# Patient Record
Sex: FEMALE | Race: WHITE | NOT HISPANIC OR LATINO | Employment: FULL TIME | URBAN - METROPOLITAN AREA
[De-identification: names, ages, dates, MRNs, and addresses within clinical notes are randomized per-mention and may not be internally consistent; named-entity substitution may affect disease eponyms.]

---

## 2017-02-17 ENCOUNTER — OFFICE VISIT (OUTPATIENT)
Dept: ORTHOPEDIC SURGERY | Facility: CLINIC | Age: 57
End: 2017-02-17

## 2017-02-17 VITALS — HEIGHT: 67 IN | BODY MASS INDEX: 45.99 KG/M2 | WEIGHT: 293 LBS

## 2017-02-17 DIAGNOSIS — IMO0002 BURSITIS/TENDONITIS, SHOULDER: ICD-10-CM

## 2017-02-17 DIAGNOSIS — M25.512 CHRONIC LEFT SHOULDER PAIN: Primary | ICD-10-CM

## 2017-02-17 DIAGNOSIS — G89.29 CHRONIC LEFT SHOULDER PAIN: Primary | ICD-10-CM

## 2017-02-17 PROCEDURE — 99203 OFFICE O/P NEW LOW 30 MIN: CPT | Performed by: ORTHOPAEDIC SURGERY

## 2017-02-17 PROCEDURE — 73030 X-RAY EXAM OF SHOULDER: CPT | Performed by: ORTHOPAEDIC SURGERY

## 2017-02-17 PROCEDURE — 20610 DRAIN/INJ JOINT/BURSA W/O US: CPT | Performed by: ORTHOPAEDIC SURGERY

## 2017-02-17 RX ORDER — BUPIVACAINE HYDROCHLORIDE 5 MG/ML
4 INJECTION, SOLUTION PERINEURAL
Status: COMPLETED | OUTPATIENT
Start: 2017-02-17 | End: 2017-02-17

## 2017-02-17 RX ORDER — METHYLPREDNISOLONE ACETATE 80 MG/ML
80 INJECTION, SUSPENSION INTRA-ARTICULAR; INTRALESIONAL; INTRAMUSCULAR; SOFT TISSUE
Status: COMPLETED | OUTPATIENT
Start: 2017-02-17 | End: 2017-02-17

## 2017-02-17 RX ADMIN — BUPIVACAINE HYDROCHLORIDE 4 ML: 5 INJECTION, SOLUTION PERINEURAL at 09:03

## 2017-02-17 RX ADMIN — METHYLPREDNISOLONE ACETATE 80 MG: 80 INJECTION, SUSPENSION INTRA-ARTICULAR; INTRALESIONAL; INTRAMUSCULAR; SOFT TISSUE at 09:03

## 2017-02-17 NOTE — PROGRESS NOTES
New Left Shoulder      Patient: Iris Hyde        YOB: 1960    Medical Record Number: [id number]        Chief Complaints: Left Shoulder Pain  Chief Complaint   Patient presents with   • Left Shoulder - Establish Care           History of Present Illness: This is a  56 y.o. female who presents with complaints of left shoulder pain.  She is right-hand-dominant is been ongoing for a couple months no history injury change in activity that she can recall she does have night pain.  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  She states it feels the same.  Her pain as a 5 out of 10 she's tried ice heat ibuprofen her pain is described as moderate constant aching with clicking popping snapping past medical history marked for colitis thyroid issues depression and anxiety skin cancer sleep apnea all of which are stable.          Allergies:   Allergies   Allergen Reactions   • Butorphanol    • Cymbalta [Duloxetine Hcl]    • Hydrocodone-Acetaminophen      norco        Medications:   Home Medications:  Current Outpatient Prescriptions on File Prior to Visit   Medication Sig   • cyclobenzaprine (FLEXERIL) 10 MG tablet Take one tablet by mouth TID*needs appt*   • levothyroxine (SYNTHROID, LEVOTHROID) 175 MCG tablet Take 1 tablet by mouth daily.   • venlafaxine XR (EFFEXOR-XR) 75 MG 24 hr capsule TAKE 1 CAPSULE ONCE DAILY WITH FOOD.   • albuterol (PROVENTIL HFA;VENTOLIN HFA) 108 (90 BASE) MCG/ACT inhaler ProAir  (90 Base) MCG/ACT Inhalation Aerosol Solution; Patient Sig: ProAir  (90 Base) MCG/ACT Inhalation Aerosol Solution INHALE 2 PUFFS EVERY 4-6 HOURS AS NEEDED.; 1; 3; 27-May-2015; Active   • melatonin tablet Take 1 mg by mouth every night.   • meloxicam (MOBIC) 7.5 MG tablet TAKE 2 TABLETS BY MOUTH DAILY.   • meloxicam (MOBIC) 7.5 MG tablet TAKE 2 TABLETS BY MOUTH DAILY.   • nitrofurantoin, macrocrystal-monohydrate,  (MACROBID) 100 MG capsule Take 1 capsule by mouth 2 (two) times a day.   • promethazine (PHENERGAN) 25 MG tablet Take 1 tablet by mouth every 4 (four) hours as needed for nausea.   • [DISCONTINUED] cyclobenzaprine (FLEXERIL) 10 MG tablet TAKE 1 TABLET BY MOUTH 3 TIMES A DAY     No current facility-administered medications on file prior to visit.      Current Medications:  Scheduled Meds:  Continuous Infusions:  No current facility-administered medications for this visit.   PRN Meds:.    Past Medical History   Diagnosis Date   • Alkaline phosphatase elevation    • Alopecia    • Anxiety    • Arthritis    • Asthmatic bronchitis    • Bladder spasms    • Chronic low back pain    • Colitis    • Depression    • Eczema    • Esophageal reflux    • Fibromyalgia    • Headache    • High risk medication use    • Migraine headache    • Neoplasm of uncertain behavior of breast    • Skin cancer    • Sleep apnea with use of continuous positive airway pressure (CPAP)    • Thyroid disorder    • Urinary frequency    • Urinary pain         Past Surgical History   Procedure Laterality Date   • Appendectomy  1980     DR. MATIAS   • Cholecystectomy  1989     DR. IGOR FUNES   • Craniotomy  1996   • Tubal abdominal ligation  1982        Social History     Occupational History   • Not on file.     Social History Main Topics   • Smoking status: Current Every Day Smoker   • Smokeless tobacco: Not on file   • Alcohol use Yes   • Drug use: Not on file   • Sexual activity: Not on file    Social History     Social History Narrative        Family History   Problem Relation Age of Onset   • Heart disease Other    • Diabetes Other    • Alcohol abuse Other    • Anxiety disorder Other    • Bipolar disorder Other    • Cancer Other    • Depression Other    • Lung disease Other    • Kidney disease Other    • Rheum arthritis Other    • Thyroid disease Other              Review of Systems: 14 point review of systems are remarkable for the pertinent  "positives listed in the chart by the patient the remainder are negative     Review of Systems      Physical Exam: 56 y.o. female  General Appearance:    Alert, cooperative, in no acute distress                 Vitals:    02/17/17 0839   Weight: 297 lb 12.8 oz (135 kg)   Height: 67\" (170.2 cm)      Patient is alert and read ×3 no acute distress appears her above-listed at height weight and age.  Affect is normal respiratory rate is normal unlabored. Heart rate regular rate rhythm, sclera, dentition and hearing are normal for the purpose of this exam.    Ortho Exam Physical exam of the left shoulder reveals no overlying skin changes no lymphedema no lymphadenopathy.  Patient has active flexion to 90° passively I can get her to 180 with mild symptoms abduction is similar external rotation is to 50 and internal rotation to the upper lumbar spine with mild symptoms.  Patient has good rotator cuff strength 4+ over 5 with isometric strength testing with pain.  Patient has a positive impingement and a positive Mejia sign.  Patient has good cervical range of motion which is full and asymptomatic no radicular symptoms.  Patient has a normal elbow exam.  Good distal pulses are presentPatient has pain with overhead activity and a positive Neer sign and a positive empty can sign        Large Joint Arthrocentesis  Date/Time: 2/17/2017 9:03 AM  Consent given by: patient  Site marked: site marked  Timeout: Immediately prior to procedure a time out was called to verify the correct patient, procedure, equipment, support staff and site/side marked as required   Supporting Documentation  Indications: pain   Procedure Details  Location: shoulder - L subacromial bursa  Preparation: Patient was prepped and draped in the usual sterile fashion  Needle size: 25 G  Approach: posterior  Medications administered: 4 mL bupivacaine; 80 mg methylPREDNISolone acetate 80 MG/ML  Patient tolerance: patient tolerated the procedure well with no " immediate complications                Radiology:   AP, Scapular Y and Axillary Lateral of the left shoulder were ordered/reviewed to evauate shoulder pain.  I've no comparative films these show some mild acromioclavicular arthritis no evidence of prior repair or any anchors that are present no acute bony pathology     Assessment/Plan: Left shoulder pain I really think this is rotator cuff in origin she can only actively flex to 90 passively and get her all the way she's got good strength I do think it's rotator cuff hopefully just inflamed and impingement.  Plan is proceed with an injection and physical therapy I'll see her back in 1 month if she fails improvement we will pursue other means of testing she is taking ibuprofen but does have a history of ulcers well will not put her on anything else by mouth but we will do the injection

## 2017-02-23 RX ORDER — VENLAFAXINE HYDROCHLORIDE 75 MG/1
CAPSULE, EXTENDED RELEASE ORAL
Qty: 30 CAPSULE | Refills: 0 | Status: SHIPPED | OUTPATIENT
Start: 2017-02-23 | End: 2017-03-29 | Stop reason: SDUPTHER

## 2017-03-01 RX ORDER — CYCLOBENZAPRINE HCL 10 MG
TABLET ORAL
Qty: 30 TABLET | Refills: 0 | Status: SHIPPED | OUTPATIENT
Start: 2017-03-01 | End: 2017-03-30 | Stop reason: SDUPTHER

## 2017-03-03 ENCOUNTER — TREATMENT (OUTPATIENT)
Dept: PHYSICAL THERAPY | Facility: CLINIC | Age: 57
End: 2017-03-03

## 2017-03-03 DIAGNOSIS — M25.512 CHRONIC LEFT SHOULDER PAIN: Primary | ICD-10-CM

## 2017-03-03 DIAGNOSIS — G89.29 CHRONIC LEFT SHOULDER PAIN: Primary | ICD-10-CM

## 2017-03-03 PROCEDURE — 97110 THERAPEUTIC EXERCISES: CPT | Performed by: PHYSICAL THERAPIST

## 2017-03-03 PROCEDURE — 97140 MANUAL THERAPY 1/> REGIONS: CPT | Performed by: PHYSICAL THERAPIST

## 2017-03-03 PROCEDURE — 97161 PT EVAL LOW COMPLEX 20 MIN: CPT | Performed by: PHYSICAL THERAPIST

## 2017-03-03 NOTE — PROGRESS NOTES
Physical Therapy Initial Evaluation and Plan of Care    TIME IN 1230 TIME OUT 0135    Subjective Evaluation    History of Present Illness  Date of onset: 10/1/2016  Mechanism of injury: Med hx: left shoulder surgery approx 10 years ago with no residual effects.   Started having pain left shoulder pain approx 6 months. Unknown cause.  Progressively getting worse.         Quality of life: fair    Pain  Quality: throbbing, sharp and radiating  Relieving factors: ice, heat and medications  Progression: worsening    Hand dominance: right    Treatments  Previous treatment: medication and injection treatment  Patient Goals  Patient goals for therapy: independence with ADLs/IADLs and decreased pain  Patient goal: alleviate pain in left shoulder           Objective     Static Posture     Shoulders  Elevated.    Scapulae  Right upwardly rotated.    Postural Observations  Seated posture: fair  Standing posture: fair        Palpation   Left   Hypotonic in the pectoralis major and upper trapezius.   Tenderness of the biceps, pectoralis major, rhomboids, teres minor and upper trapezius.     Tenderness     Left Shoulder   Tenderness in the subscapularis tendon.     Cervical/Thoracic Screen   Cervical range of motion within normal limits with the following exceptions: 75% available    Active Range of Motion   Left Shoulder   Flexion: 90 degrees   Abduction: 90 degrees   External rotation 45°: 70 degrees     Passive Range of Motion   Left Shoulder   Flexion: 140 degrees   Abduction: 110 degrees   External rotation 45°: 50 degrees     Strength/Myotome Testing     Left Shoulder     Planes of Motion   Flexion: 3+   Abduction: 3+   External rotation at 0°: 3+     Right Shoulder     Planes of Motion   Flexion: 4   Extension: 4   External rotation at 0°: 4     Tests     Left Shoulder   Negative active compression ([place]), drop arm, empty can, full can and Neer's.          Assessment & Plan     Assessment  Impairments: abnormal or  restricted ROM, activity intolerance, impaired physical strength and lacks appropriate home exercise program  Assessment details: Patient is a 56 year old female who presents with increased left shoulder pain with unknown onset.  She states that the pain has been ongoing for 6 months and is progressively getting worse.  She has constant throbbing and intermittent sharp radiating pain down into her arm.  Upon assessment she has point tenderness bicipital groove and SC joint.  AROM and Passive ROM are both limited secondary to pain.  Negative special testing for internal derangement. Postural assessment reveals elevated shoulder bilaterally L>R with upwardly rounded left scapula. Positive shrug sign. Strength is only mildly inhibited.   Prognosis: good  Prognosis details: Long Term Goals (6 weeks)    Patient is able to return to work with minimal to no discomfort  Patient is able to lift 25 lbs from floor to waist  Patient is able to lift 15 lbs from waist to shoulder  Patient is able to lift 10 lbs from shoulder to overhead  Patient is able stand for as long as needed for work related tasks.          Short Term Goals (3 weeks)    Patient is independent with HEP  Patient is able to sleep without being disrupted by pain.   Patient has full AROM/PROM of left shoulder  Patient has greater than 50% improvement overall.   Patient is able to reach into overhead cabinets with minimal discomfort  Left UE strength 4/5 throughout.            Goals  alleviate pain in left shoulder    Plan  Therapy options: will be seen for skilled physical therapy services  Planned modality interventions: cryotherapy, ultrasound, iontophoresis, hydrotherapy, electrical stimulation/Russian stimulation and thermotherapy (hydrocollator packs)  Planned therapy interventions: manual therapy, body mechanics training, flexibility, functional ROM exercises, home exercise program, gait training, joint mobilization, therapeutic activities, stretching,  strengthening, soft tissue mobilization and abdominal trunk stabilization  Frequency: 3x week  Duration in weeks: 6  Treatment plan discussed with: patient        Manual Therapy:    25     mins  86524;  Therapeutic Exercise:    20     mins  08157;     Timed Treatment:   45   mins   Total Treatment:     60   mins    PT SIGNATURE: Colleen Apple, PT   DATE TREATMENT INITIATED: 3/3/2017    Initial Certification  Certification Period: 6/1/2017  I certify that the therapy services are furnished while this patient is under my care.  The services outlined above are required by this patient, and will be reviewed every 90 days.     PHYSICIAN: Edilia Iqbal MD      DATE:     Please sign and return via fax to [phone].. Thank you, [facility].

## 2017-03-17 ENCOUNTER — OFFICE VISIT (OUTPATIENT)
Dept: ORTHOPEDIC SURGERY | Facility: CLINIC | Age: 57
End: 2017-03-17

## 2017-03-17 VITALS — HEIGHT: 67 IN | BODY MASS INDEX: 45.99 KG/M2 | WEIGHT: 293 LBS

## 2017-03-17 DIAGNOSIS — M75.102 TEAR OF LEFT ROTATOR CUFF, UNSPECIFIED TEAR EXTENT: Primary | ICD-10-CM

## 2017-03-17 PROCEDURE — 99212 OFFICE O/P EST SF 10 MIN: CPT | Performed by: ORTHOPAEDIC SURGERY

## 2017-03-17 NOTE — PROGRESS NOTES
"Left Shoulder Follow Up      Patient: Iris Hyde        YOB: 1960            Chief Complaints: Left Shoulder pain  Chief Complaint   Patient presents with   • Left Shoulder - Follow-up           History of Present Illness: This patient is here follow-up of left shoulder pain.  We have undertaken conservative management in the form of physical therapy injections rest strengthening all with no improvement in her symptoms.  She still has significant activity limiting pain and night pain      Physical Exam: 56 y.o. female  General Appearance:    Alert, cooperative, in no acute distress                   Vitals:    03/17/17 0754   Weight: 297 lb (135 kg)   Height: 67\" (170.2 cm)        Patient is alert and read ×3 no acute distress appears her above-listed at height weight and age.  Affect is normal respiratory rate is normal unlabored. Heart rate regular rate rhythm, sclera, dentition and hearing are normal for the purpose of this exam.      Ortho Exam    Physical exam of the left shoulder reveals no overlying skin changes no lymphedema no lymphadenopathy.  Patient has active flexion 180 with mild symptoms abduction is similar external rotation is to 50 and internal rotation to the upper lumbar spine with mild symptoms.  Patient has good rotator cuff strength 4+ over 5 with isometric strength testing with pain.  Patient has a positive impingement and a positive Mejia sign.  Patient has good cervical range of motion which is full and asymptomatic no radicular symptoms.  Patient has a normal elbow exam.  Good distal pulses are presentPatient has pain with overhead activity and a positive Neer sign and a positive empty can sign              Assessment/Plan:      Persistent left shoulder pain despite conservative management plan is to proceed with an MRI.  My suspicion is she has a rotator cuff tear which she and I discussed in detail.  I will see her back after her MRI            "

## 2017-03-24 ENCOUNTER — OFFICE VISIT (OUTPATIENT)
Dept: ORTHOPEDIC SURGERY | Facility: CLINIC | Age: 57
End: 2017-03-24

## 2017-03-24 DIAGNOSIS — M75.102 TEAR OF LEFT ROTATOR CUFF, UNSPECIFIED TEAR EXTENT: ICD-10-CM

## 2017-03-24 PROCEDURE — 73221 MRI JOINT UPR EXTREM W/O DYE: CPT | Performed by: ORTHOPAEDIC SURGERY

## 2017-03-29 RX ORDER — VENLAFAXINE HYDROCHLORIDE 75 MG/1
CAPSULE, EXTENDED RELEASE ORAL
Qty: 15 CAPSULE | Refills: 0 | Status: SHIPPED | OUTPATIENT
Start: 2017-03-29 | End: 2017-06-09 | Stop reason: SDUPTHER

## 2017-03-30 RX ORDER — CYCLOBENZAPRINE HCL 10 MG
TABLET ORAL
Qty: 30 TABLET | Refills: 0 | Status: SHIPPED | OUTPATIENT
Start: 2017-03-30 | End: 2017-04-21 | Stop reason: SDUPTHER

## 2017-04-18 ENCOUNTER — DOCUMENTATION (OUTPATIENT)
Dept: PHYSICAL THERAPY | Facility: CLINIC | Age: 57
End: 2017-04-18

## 2017-04-18 NOTE — PROGRESS NOTES
Discharge Summary  Discharge Summary from Physical Therapy Report      Dates  PT visit: 03/03/2017  Number of Visits: 1     Discharge Status of Patient: See MD Note dated Evaluation only; patient cancelled remaining appointments.     Goals: unknown    Discharge Plan: Patient to return to referring/providing physician  patient did not return for additional physical therapy    Comments 5    Date of Discharge 04/18/17        Colleen Apple, PT  Physical Therapist

## 2017-04-21 RX ORDER — CYCLOBENZAPRINE HCL 10 MG
TABLET ORAL
Qty: 30 TABLET | Refills: 0 | Status: SHIPPED | OUTPATIENT
Start: 2017-04-21 | End: 2017-06-09 | Stop reason: SDUPTHER

## 2017-05-28 RX ORDER — CYCLOBENZAPRINE HCL 10 MG
TABLET ORAL
Qty: 30 TABLET | Refills: 0 | OUTPATIENT
Start: 2017-05-28

## 2017-06-01 RX ORDER — CYCLOBENZAPRINE HCL 10 MG
TABLET ORAL
Qty: 30 TABLET | Refills: 0 | OUTPATIENT
Start: 2017-06-01

## 2017-06-01 RX ORDER — VENLAFAXINE HYDROCHLORIDE 75 MG/1
CAPSULE, EXTENDED RELEASE ORAL
Qty: 15 CAPSULE | Refills: 0 | OUTPATIENT
Start: 2017-06-01

## 2017-06-09 ENCOUNTER — TELEPHONE (OUTPATIENT)
Dept: FAMILY MEDICINE CLINIC | Facility: CLINIC | Age: 57
End: 2017-06-09

## 2017-06-09 RX ORDER — VENLAFAXINE HYDROCHLORIDE 75 MG/1
CAPSULE, EXTENDED RELEASE ORAL
Qty: 30 CAPSULE | Refills: 0 | Status: SHIPPED | OUTPATIENT
Start: 2017-06-09 | End: 2017-07-07 | Stop reason: SDUPTHER

## 2017-06-09 RX ORDER — CYCLOBENZAPRINE HCL 10 MG
TABLET ORAL
Qty: 30 TABLET | Refills: 0 | Status: SHIPPED | OUTPATIENT
Start: 2017-06-09 | End: 2017-06-28 | Stop reason: SDUPTHER

## 2017-06-09 NOTE — TELEPHONE ENCOUNTER
----- Message from Quirino Carroll sent at 6/9/2017  2:26 PM EDT -----  Patient would like a refill on Flexeril 10 mg & Effexor 75 mg 30 day supply for both. [facility] [address]. Patient is scheduled to see the doctor on Wednesday 6/14/17.    Thanks,  Silke

## 2017-06-14 ENCOUNTER — OFFICE VISIT (OUTPATIENT)
Dept: FAMILY MEDICINE CLINIC | Facility: CLINIC | Age: 57
End: 2017-06-14

## 2017-06-14 VITALS
BODY MASS INDEX: 45.99 KG/M2 | SYSTOLIC BLOOD PRESSURE: 148 MMHG | OXYGEN SATURATION: 98 % | DIASTOLIC BLOOD PRESSURE: 92 MMHG | RESPIRATION RATE: 16 BRPM | HEART RATE: 74 BPM | TEMPERATURE: 98.3 F | HEIGHT: 67 IN | WEIGHT: 293 LBS

## 2017-06-14 DIAGNOSIS — M19.90 ARTHRITIS: ICD-10-CM

## 2017-06-14 DIAGNOSIS — S80.12XD CONTUSION OF LEG, LEFT, SUBSEQUENT ENCOUNTER: ICD-10-CM

## 2017-06-14 DIAGNOSIS — K21.9 GASTROESOPHAGEAL REFLUX DISEASE WITHOUT ESOPHAGITIS: ICD-10-CM

## 2017-06-14 DIAGNOSIS — G47.30 SLEEP APNEA, UNSPECIFIED TYPE: ICD-10-CM

## 2017-06-14 DIAGNOSIS — E03.9 ACQUIRED HYPOTHYROIDISM: ICD-10-CM

## 2017-06-14 DIAGNOSIS — E66.01 OBESITY, MORBID, BMI 40.0-49.9 (HCC): ICD-10-CM

## 2017-06-14 DIAGNOSIS — M79.7 FIBROMYALGIA: Primary | ICD-10-CM

## 2017-06-14 DIAGNOSIS — G43.909 MIGRAINE WITHOUT STATUS MIGRAINOSUS, NOT INTRACTABLE, UNSPECIFIED MIGRAINE TYPE: ICD-10-CM

## 2017-06-14 DIAGNOSIS — Z79.899 HIGH RISK MEDICATION USE: ICD-10-CM

## 2017-06-14 DIAGNOSIS — Z00.00 HEALTH CARE MAINTENANCE: ICD-10-CM

## 2017-06-14 PROBLEM — K52.9 NON-SPECIFIC COLITIS: Status: RESOLVED | Noted: 2017-06-14 | Resolved: 2017-06-14

## 2017-06-14 PROBLEM — L65.9 LOSS OF HAIR: Status: ACTIVE | Noted: 2017-06-14

## 2017-06-14 PROBLEM — E07.9 DISORDER OF THYROID: Status: ACTIVE | Noted: 2017-06-14

## 2017-06-14 PROBLEM — R30.0 DYSURIA: Status: ACTIVE | Noted: 2017-06-14

## 2017-06-14 PROBLEM — D48.60 NEOPLASM OF UNCERTAIN BEHAVIOR OF BREAST: Status: ACTIVE | Noted: 2017-06-14

## 2017-06-14 PROBLEM — F32.A DEPRESSION: Status: ACTIVE | Noted: 2017-06-14

## 2017-06-14 PROBLEM — L30.9 ECZEMA: Status: ACTIVE | Noted: 2017-06-14

## 2017-06-14 PROBLEM — R11.2 NAUSEA AND VOMITING: Status: ACTIVE | Noted: 2017-06-14

## 2017-06-14 PROBLEM — M54.9 BACK PAIN: Status: ACTIVE | Noted: 2017-06-14

## 2017-06-14 PROBLEM — N39.0 INFECTION OF URINARY TRACT: Status: ACTIVE | Noted: 2017-06-14

## 2017-06-14 PROBLEM — R74.8 ELEVATED SERUM ALKALINE PHOSPHATASE LEVEL: Status: ACTIVE | Noted: 2017-06-14

## 2017-06-14 PROBLEM — K52.9 NON-SPECIFIC COLITIS: Status: ACTIVE | Noted: 2017-06-14
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
## 2017-06-14 PROBLEM — F41.9 ANXIETY: Status: ACTIVE | Noted: 2017-06-14

## 2017-06-14 PROBLEM — R35.0 INCREASED FREQUENCY OF URINATION: Status: ACTIVE | Noted: 2017-06-14

## 2017-06-14 PROBLEM — K52.9 NONINFECTIOUS GASTROENTERITIS: Status: ACTIVE | Noted: 2017-06-14

## 2017-06-14 PROBLEM — R11.2 NAUSEA AND VOMITING: Status: RESOLVED | Noted: 2017-06-14 | Resolved: 2017-06-14

## 2017-06-14 PROBLEM — S00.83XA CONTUSION OF FOREHEAD: Status: ACTIVE | Noted: 2017-06-14

## 2017-06-14 PROBLEM — K52.9 NONINFECTIOUS GASTROENTERITIS: Status: RESOLVED | Noted: 2017-06-14 | Resolved: 2017-06-14

## 2017-06-14 PROBLEM — C80.1 MALIGNANT NEOPLASTIC DISEASE: Status: ACTIVE | Noted: 2017-06-14

## 2017-06-14 PROBLEM — K43.2 INCISIONAL HERNIA: Status: ACTIVE | Noted: 2017-06-14

## 2017-06-14 PROBLEM — R31.9 HEMATURIA: Status: ACTIVE | Noted: 2017-06-14

## 2017-06-14 PROCEDURE — 99214 OFFICE O/P EST MOD 30 MIN: CPT | Performed by: FAMILY MEDICINE

## 2017-06-14 NOTE — PROGRESS NOTES
HPI  Iris Hyde is a 57 y.o. female who is here for follow up of hypothyroidism hypertension and obesity.  Patient had a recent fall sustaining a hematoma to the left lower leg.  Also is asking about bariatric surgery.  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      Review of Systems   Cardiovascular: Positive for leg swelling.   Gastrointestinal: Positive for abdominal distention.   Endocrine: Positive for heat intolerance.   Musculoskeletal: Positive for arthralgias, back pain, joint swelling and myalgias.   All other systems reviewed and are negative.        Past Medical History:   Diagnosis Date   • Alkaline phosphatase elevation    • Alopecia    • Anxiety    • Arthritis    • Asthmatic bronchitis    • Bladder spasms    • Chronic low back pain    • Colitis    • Depression    • Eczema    • Esophageal reflux    • Fibromyalgia    • Headache    • High risk medication use    • Migraine headache    • Neoplasm of uncertain behavior of breast    • Skin cancer    • Sleep apnea with use of continuous positive airway pressure (CPAP)    • Thyroid disorder    • Urinary frequency    • Urinary pain        Past Surgical History:   Procedure Laterality Date   • APPENDECTOMY  1980    DR. MATIAS   • CHOLECYSTECTOMY  1989    DR. IGOR FUNES   • CRANIOTOMY  1996   • TUBAL ABDOMINAL LIGATION  1982       Family History   Problem Relation Age of Onset   • Heart disease Other    • Diabetes Other    • Alcohol abuse Other    • Anxiety disorder Other    • Bipolar disorder Other    • Cancer Other    • Depression Other    • Lung disease Other    • Kidney disease Other    • Rheum arthritis Other    • Thyroid disease Other        Social History     Social History   • Marital status:      Spouse name: N/A   • Number of children: N/A   • Years of education: N/A     Occupational History   • Not on file.     Social History Main Topics   • Smoking status: Current Every Day Smoker   • Smokeless tobacco: Not on file   • Alcohol use Yes    • Drug use: Yes     Special: Marijuana   • Sexual activity: Not on file     Other Topics Concern   • Not on file     Social History Narrative         Physical Exam   Constitutional: She is oriented to person, place, and time. She appears well-developed and well-nourished. No distress.   HENT:   Head: Normocephalic.   Mouth/Throat: Oropharynx is clear and moist.   Eyes: Conjunctivae are normal. Pupils are equal, round, and reactive to light.   Neck: Normal range of motion. Neck supple. No JVD present. No thyromegaly present.   Cardiovascular: Normal rate, regular rhythm and normal heart sounds.    Pulmonary/Chest: Effort normal. No respiratory distress. She has no wheezes.   Abdominal: Soft. She exhibits no distension. There is no tenderness.   Musculoskeletal: She exhibits edema, tenderness and deformity.        Left lower leg: She exhibits tenderness, swelling, edema and deformity. She exhibits no bony tenderness and no laceration.        Legs:  Neurological: She is alert and oriented to person, place, and time. She exhibits normal muscle tone. Coordination normal.   Skin: Skin is warm and dry. Ecchymosis noted. No erythema.        Psychiatric: She has a normal mood and affect. Her behavior is normal. Judgment and thought content normal.   Nursing note and vitals reviewed.        Assessment/Plan    Iris was seen today for hypothyroidism, obesity and fall.    Diagnoses and all orders for this visit:    Fibromyalgia    Health care maintenance  -     Lipid Panel  -     Hepatitis C Antibody    High risk medication use  -     CBC & Differential  -     Comprehensive Metabolic Panel    Arthritis    Acquired hypothyroidism  -     TSH+Free T4    Migraine without status migrainosus, not intractable, unspecified migraine type    Gastroesophageal reflux disease without esophagitis    Contusion of leg, left, subsequent encounter    Obesity, morbid, BMI 40.0-49.9  -     Ambulatory Referral to Bariatric Surgery    Sleep apnea,  unspecified type      Patient presents for follow-up of above-noted medical problems.  Overall doing well except recently tripped and has a large hematoma left lateral calf area.  Considering the current therapy and return if any worsening or does not continue to slowly resolve.  Will get routine lab work as noted and also refer to bariatric surgery as discussed.  Otherwise continue current therapy and follow-up in 6 months.  Encouraged healthy diet and weight loss.    This note includes information entered using a voice recognition dictation system.  Though reviewed, some nonsensible errors may remain.

## 2017-06-15 DIAGNOSIS — E03.9 ACQUIRED HYPOTHYROIDISM: Primary | ICD-10-CM

## 2017-06-15 LAB
ALBUMIN SERPL-MCNC: 4.3 G/DL (ref 3.5–5.2)
ALBUMIN/GLOB SERPL: 1.6 G/DL
ALP SERPL-CCNC: 121 U/L (ref 39–117)
ALT SERPL-CCNC: 20 U/L (ref 1–33)
AST SERPL-CCNC: 26 U/L (ref 1–32)
BASOPHILS # BLD AUTO: 0.05 10*3/MM3 (ref 0–0.2)
BASOPHILS NFR BLD AUTO: 0.6 % (ref 0–1.5)
BILIRUB SERPL-MCNC: 0.2 MG/DL (ref 0.1–1.2)
BUN SERPL-MCNC: 15 MG/DL (ref 6–20)
BUN/CREAT SERPL: 14.2 (ref 7–25)
CALCIUM SERPL-MCNC: 9.1 MG/DL (ref 8.6–10.5)
CHLORIDE SERPL-SCNC: 102 MMOL/L (ref 98–107)
CHOLEST SERPL-MCNC: 191 MG/DL (ref 0–200)
CO2 SERPL-SCNC: 23.1 MMOL/L (ref 22–29)
CREAT SERPL-MCNC: 1.06 MG/DL (ref 0.57–1)
EOSINOPHIL # BLD AUTO: 0.18 10*3/MM3 (ref 0–0.7)
EOSINOPHIL NFR BLD AUTO: 2.1 % (ref 0.3–6.2)
ERYTHROCYTE [DISTWIDTH] IN BLOOD BY AUTOMATED COUNT: 14.1 % (ref 11.7–13)
GLOBULIN SER CALC-MCNC: 2.7 GM/DL
GLUCOSE SERPL-MCNC: 97 MG/DL (ref 65–99)
HCT VFR BLD AUTO: 40.7 % (ref 35.6–45.5)
HCV AB S/CO SERPL IA: <0.1 S/CO RATIO (ref 0–0.9)
HDLC SERPL-MCNC: 41 MG/DL (ref 40–60)
HGB BLD-MCNC: 13.6 G/DL (ref 11.9–15.5)
IMM GRANULOCYTES # BLD: 0.04 10*3/MM3 (ref 0–0.03)
IMM GRANULOCYTES NFR BLD: 0.5 % (ref 0–0.5)
INTERPRETATION: NORMAL
LDLC SERPL CALC-MCNC: 121 MG/DL (ref 0–100)
LYMPHOCYTES # BLD AUTO: 4.22 10*3/MM3 (ref 0.9–4.8)
LYMPHOCYTES NFR BLD AUTO: 48.5 % (ref 19.6–45.3)
MCH RBC QN AUTO: 32.9 PG (ref 26.9–32)
MCHC RBC AUTO-ENTMCNC: 33.4 G/DL (ref 32.4–36.3)
MCV RBC AUTO: 98.3 FL (ref 80.5–98.2)
MONOCYTES # BLD AUTO: 0.69 10*3/MM3 (ref 0.2–1.2)
MONOCYTES NFR BLD AUTO: 7.9 % (ref 5–12)
NEUTROPHILS # BLD AUTO: 3.52 10*3/MM3 (ref 1.9–8.1)
NEUTROPHILS NFR BLD AUTO: 40.4 % (ref 42.7–76)
PLATELET # BLD AUTO: 236 10*3/MM3 (ref 140–500)
POTASSIUM SERPL-SCNC: 3.8 MMOL/L (ref 3.5–5.2)
PROT SERPL-MCNC: 7 G/DL (ref 6–8.5)
RBC # BLD AUTO: 4.14 10*6/MM3 (ref 3.9–5.2)
SODIUM SERPL-SCNC: 143 MMOL/L (ref 136–145)
T4 FREE SERPL-MCNC: 1.47 NG/DL (ref 0.93–1.7)
TRIGL SERPL-MCNC: 147 MG/DL (ref 0–150)
TSH SERPL DL<=0.005 MIU/L-ACNC: 10.87 MIU/ML (ref 0.27–4.2)
VLDLC SERPL CALC-MCNC: 29.4 MG/DL (ref 5–40)
WBC # BLD AUTO: 8.7 10*3/MM3 (ref 4.5–10.7)

## 2017-06-15 RX ORDER — LEVOTHYROXINE SODIUM 0.2 MG/1
200 TABLET ORAL DAILY
Qty: 90 TABLET | Refills: 3 | Status: SHIPPED | OUTPATIENT
Start: 2017-06-15 | End: 2017-09-06 | Stop reason: SDUPTHER

## 2017-06-28 RX ORDER — CYCLOBENZAPRINE HCL 10 MG
TABLET ORAL
Qty: 30 TABLET | Refills: 0 | Status: CANCELLED | OUTPATIENT
Start: 2017-06-28

## 2017-06-28 RX ORDER — CYCLOBENZAPRINE HCL 10 MG
TABLET ORAL
Qty: 90 TABLET | Refills: 5 | Status: SHIPPED | OUTPATIENT
Start: 2017-06-28 | End: 2018-03-29 | Stop reason: SDUPTHER

## 2017-06-28 NOTE — TELEPHONE ENCOUNTER
Dr. Willis,    See message below is there a reason why she is only getting a 10 day supply, is this a one time medication you gave  Her at the office visit for 6/09/2017?  I don't see anything being mentioned.  Please advice.    Thank you.    ----- Message from Ana Cristina Chun sent at 6/28/2017  1:49 PM EDT -----  PT SAID PHARM IS ONLY GIVING HER 10 DAY SUPPLY, SHOULD BE 30 DAYS.    cyclobenzaprine (FLEXERIL) 10 MG tablet   Sig: TAKE 1 TABLET BY MOUTH THREE TIMES DAILY.      [facility] [address] [facility] [address] AT [facility] [facility] - [phone] [facility] [phone] FX

## 2017-07-07 RX ORDER — VENLAFAXINE HYDROCHLORIDE 75 MG/1
CAPSULE, EXTENDED RELEASE ORAL
Qty: 30 CAPSULE | Refills: 0 | Status: SHIPPED | OUTPATIENT
Start: 2017-07-07 | End: 2017-07-31 | Stop reason: SDUPTHER

## 2017-07-31 RX ORDER — VENLAFAXINE HYDROCHLORIDE 75 MG/1
CAPSULE, EXTENDED RELEASE ORAL
Qty: 30 CAPSULE | Refills: 0 | Status: SHIPPED | OUTPATIENT
Start: 2017-07-31 | End: 2017-09-12 | Stop reason: SDUPTHER

## 2017-09-06 ENCOUNTER — OFFICE VISIT (OUTPATIENT)
Dept: FAMILY MEDICINE CLINIC | Facility: CLINIC | Age: 57
End: 2017-09-06

## 2017-09-06 VITALS
BODY MASS INDEX: 45.99 KG/M2 | DIASTOLIC BLOOD PRESSURE: 100 MMHG | OXYGEN SATURATION: 99 % | HEIGHT: 67 IN | HEART RATE: 68 BPM | SYSTOLIC BLOOD PRESSURE: 140 MMHG | TEMPERATURE: 98.1 F | WEIGHT: 293 LBS | RESPIRATION RATE: 16 BRPM

## 2017-09-06 DIAGNOSIS — E66.01 OBESITY, MORBID, BMI 40.0-49.9 (HCC): ICD-10-CM

## 2017-09-06 DIAGNOSIS — K21.9 GASTROESOPHAGEAL REFLUX DISEASE WITHOUT ESOPHAGITIS: Primary | ICD-10-CM

## 2017-09-06 DIAGNOSIS — L65.9 LOSS OF HAIR: ICD-10-CM

## 2017-09-06 DIAGNOSIS — J45.909 ASTHMATIC BRONCHITIS, UNSPECIFIED ASTHMA SEVERITY, UNCOMPLICATED: ICD-10-CM

## 2017-09-06 DIAGNOSIS — F32.A DEPRESSION, UNSPECIFIED DEPRESSION TYPE: ICD-10-CM

## 2017-09-06 DIAGNOSIS — E03.9 ACQUIRED HYPOTHYROIDISM: ICD-10-CM

## 2017-09-06 DIAGNOSIS — G47.30 SLEEP APNEA, UNSPECIFIED TYPE: ICD-10-CM

## 2017-09-06 PROCEDURE — 99214 OFFICE O/P EST MOD 30 MIN: CPT | Performed by: FAMILY MEDICINE

## 2017-09-06 RX ORDER — PANTOPRAZOLE SODIUM 40 MG/1
40 TABLET, DELAYED RELEASE ORAL DAILY
Qty: 90 TABLET | Refills: 3 | Status: SHIPPED | OUTPATIENT
Start: 2017-09-06 | End: 2018-10-03 | Stop reason: SDUPTHER

## 2017-09-06 RX ORDER — LEVOTHYROXINE SODIUM 300 UG/1
300 TABLET ORAL DAILY
Qty: 30 TABLET | Refills: 2 | Status: SHIPPED | OUTPATIENT
Start: 2017-09-06 | End: 2017-09-29 | Stop reason: SDUPTHER

## 2017-09-06 NOTE — PROGRESS NOTES
HPI  Iris Hyde is a 57 y.o. female who is here for follow up of fibromyalgia as well as severe fatigue.  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  Lost her thyroid medication a couple of weeks ago and has been not taking it.  Reports losing a lot of hair and even prior to stopping thyroid medication had severe fatigue.  Has known fibromyalgia and has continued aches and pains.      Review of Systems   Constitutional: Positive for diaphoresis and fatigue.   HENT: Positive for rhinorrhea and sinus pressure.    Respiratory: Positive for cough, shortness of breath and stridor.    Gastrointestinal: Positive for abdominal distention.   Genitourinary: Positive for frequency and urgency.   Musculoskeletal: Positive for arthralgias, back pain and myalgias.   Neurological: Positive for headaches.   Psychiatric/Behavioral: Positive for decreased concentration and sleep disturbance.         Past Medical History:   Diagnosis Date   • Alkaline phosphatase elevation    • Alopecia    • Anxiety    • Arthritis    • Asthmatic bronchitis    • Bladder spasms    • Chronic low back pain    • Colitis    • Depression    • Eczema    • Esophageal reflux    • Fibromyalgia    • Headache    • High risk medication use    • Migraine headache    • Neoplasm of uncertain behavior of breast    • Skin cancer    • Sleep apnea with use of continuous positive airway pressure (CPAP)    • Thyroid disorder    • Urinary frequency    • Urinary pain        Past Surgical History:   Procedure Laterality Date   • APPENDECTOMY  1980    DR. MATIAS   • CHOLECYSTECTOMY  1989    DR. IGOR FUNES   • CRANIOTOMY  1996   • TUBAL ABDOMINAL LIGATION  1982       Family History   Problem Relation Age of Onset   • Heart disease Other    • Diabetes Other    • Alcohol abuse Other    • Anxiety disorder Other    • Bipolar disorder Other    • Cancer Other    • Depression Other    • Lung disease Other    • Kidney disease Other    • Rheum arthritis  Other    • Thyroid disease Other        Social History     Social History   • Marital status:      Spouse name: N/A   • Number of children: N/A   • Years of education: N/A     Occupational History   • Not on file.     Social History Main Topics   • Smoking status: Current Every Day Smoker   • Smokeless tobacco: Not on file   • Alcohol use Yes   • Drug use: Yes     Special: Marijuana   • Sexual activity: No     Other Topics Concern   • Not on file     Social History Narrative         Physical Exam   Constitutional: She is oriented to person, place, and time. She appears well-developed and well-nourished. She appears distressed.   HENT:   Head: Normocephalic.   Mouth/Throat: Oropharynx is clear and moist.   Eyes: Conjunctivae are normal. Pupils are equal, round, and reactive to light.   Neck: Normal range of motion. Neck supple.   Cardiovascular: Normal rate, regular rhythm and normal heart sounds.    Pulmonary/Chest: Effort normal.   Abdominal: Soft. She exhibits no distension.   Musculoskeletal: She exhibits no edema or deformity.   Neurological: She is alert and oriented to person, place, and time. Coordination normal.   Skin: Skin is warm and dry.   Psychiatric: She has a normal mood and affect. Her behavior is normal. Judgment and thought content normal.   Nursing note and vitals reviewed.        Assessment/Plan    Iris was seen today for shoulder pain, fall, fatigue, night sweats, fibromyalgia, obesity and nicotine dependence.    Diagnoses and all orders for this visit:    Gastroesophageal reflux disease without esophagitis  -     pantoprazole (PROTONIX) 40 MG EC tablet; Take 1 tablet by mouth Daily.    Acquired hypothyroidism  -     levothyroxine (SYNTHROID, LEVOTHROID) 300 MCG tablet; Take 1 tablet by mouth Daily.    Obesity, morbid, BMI 40.0-49.9    Loss of hair    Asthmatic bronchitis, unspecified asthma severity, uncomplicated    Sleep apnea, unspecified type    Depression, unspecified depression  type        Patient is here for multiple medical problems some of which are noted above.  Reports lost thyroid medication a couple of weeks ago and even prior to that had symptoms consistent with hypothyroidism.  In fact most recent TSH level was elevated.  Will resume thyroid replacement initially at 150 µg daily and after one week increase to 300 µg.  If develops chest pain shortness of breath tachycardia or other symptoms told to stop medication and call.  Otherwise follow-up appointment in one month at which time thyroid level and probably blood count and other lab will be obtained.    This note includes information entered using a voice recognition dictation system.  Though reviewed, some nonsensible errors may remain.

## 2017-09-12 RX ORDER — VENLAFAXINE HYDROCHLORIDE 75 MG/1
CAPSULE, EXTENDED RELEASE ORAL
Qty: 30 CAPSULE | Refills: 0 | Status: SHIPPED | OUTPATIENT
Start: 2017-09-12 | End: 2017-11-01 | Stop reason: SDUPTHER

## 2017-09-29 DIAGNOSIS — E03.9 ACQUIRED HYPOTHYROIDISM: ICD-10-CM

## 2017-09-29 RX ORDER — LEVOTHYROXINE SODIUM 300 UG/1
TABLET ORAL
Qty: 30 TABLET | Refills: 0 | Status: SHIPPED | OUTPATIENT
Start: 2017-09-29 | End: 2017-11-06 | Stop reason: SDUPTHER

## 2017-10-10 ENCOUNTER — OFFICE VISIT (OUTPATIENT)
Dept: FAMILY MEDICINE CLINIC | Facility: CLINIC | Age: 57
End: 2017-10-10

## 2017-10-10 VITALS
WEIGHT: 293 LBS | HEIGHT: 67 IN | BODY MASS INDEX: 45.99 KG/M2 | HEART RATE: 84 BPM | RESPIRATION RATE: 16 BRPM | SYSTOLIC BLOOD PRESSURE: 140 MMHG | TEMPERATURE: 98.5 F | DIASTOLIC BLOOD PRESSURE: 90 MMHG | OXYGEN SATURATION: 97 %

## 2017-10-10 DIAGNOSIS — R53.83 FATIGUE, UNSPECIFIED TYPE: Primary | ICD-10-CM

## 2017-10-10 DIAGNOSIS — J45.909 ASTHMATIC BRONCHITIS WITHOUT COMPLICATION, UNSPECIFIED ASTHMA SEVERITY, UNSPECIFIED WHETHER PERSISTENT: ICD-10-CM

## 2017-10-10 DIAGNOSIS — F17.210 CIGARETTE SMOKER: ICD-10-CM

## 2017-10-10 DIAGNOSIS — M79.7 FIBROMYALGIA: ICD-10-CM

## 2017-10-10 DIAGNOSIS — Z12.2 ENCOUNTER FOR SCREENING FOR LUNG CANCER: ICD-10-CM

## 2017-10-10 DIAGNOSIS — K21.9 GASTROESOPHAGEAL REFLUX DISEASE WITHOUT ESOPHAGITIS: ICD-10-CM

## 2017-10-10 DIAGNOSIS — E03.9 ACQUIRED HYPOTHYROIDISM: ICD-10-CM

## 2017-10-10 DIAGNOSIS — E66.01 OBESITY, MORBID, BMI 40.0-49.9 (HCC): ICD-10-CM

## 2017-10-10 PROCEDURE — 99214 OFFICE O/P EST MOD 30 MIN: CPT | Performed by: FAMILY MEDICINE

## 2017-10-10 PROCEDURE — 90656 IIV3 VACC NO PRSV 0.5 ML IM: CPT | Performed by: FAMILY MEDICINE

## 2017-10-10 PROCEDURE — 90471 IMMUNIZATION ADMIN: CPT | Performed by: FAMILY MEDICINE

## 2017-10-10 RX ORDER — ALBUTEROL SULFATE 90 UG/1
2 AEROSOL, METERED RESPIRATORY (INHALATION) EVERY 4 HOURS PRN
Qty: 1 INHALER | Refills: 11 | Status: SHIPPED | OUTPATIENT
Start: 2017-10-10 | End: 2018-11-11 | Stop reason: SDUPTHER

## 2017-10-10 NOTE — PROGRESS NOTES
HPI  Iris Hyde is a 57 y.o. female who is here for follow up of hypothyroidism after recently resuming medication.  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  Continues to smoke and has had several acquaintances recently diagnosed with lung cancer.  Again strongly recommended discontinuing cigarette use.  Also requesting rescue inhaler and discussed starting maintenance inhaler because of her obvious COPD.  Despite resuming thyroid supplement continues with fatigue and hair loss.       Review of Systems   Constitutional: Positive for fatigue.   Respiratory: Positive for cough, shortness of breath and wheezing.    Cardiovascular: Positive for leg swelling.   Gastrointestinal: Positive for nausea.   Musculoskeletal: Positive for arthralgias, back pain, myalgias and neck pain.        Right shoulder pain especially after recent injury   All other systems reviewed and are negative.        Past Medical History:   Diagnosis Date   • Alkaline phosphatase elevation    • Alopecia    • Anxiety    • Arthritis    • Asthmatic bronchitis    • Bladder spasms    • Chronic low back pain    • Colitis    • Depression    • Eczema    • Esophageal reflux    • Fibromyalgia    • Headache    • High risk medication use    • Migraine headache    • Neoplasm of uncertain behavior of breast    • Skin cancer    • Sleep apnea with use of continuous positive airway pressure (CPAP)    • Thyroid disorder    • Urinary frequency    • Urinary pain        Past Surgical History:   Procedure Laterality Date   • APPENDECTOMY  1980    DR. MATIAS   • CHOLECYSTECTOMY  1989    DR. IGOR FUNES   • CRANIOTOMY  1996   • TUBAL ABDOMINAL LIGATION  1982       Family History   Problem Relation Age of Onset   • Heart disease Other    • Diabetes Other    • Alcohol abuse Other    • Anxiety disorder Other    • Bipolar disorder Other    • Cancer Other    • Depression Other    • Lung disease Other    • Kidney disease  Other    • Rheum arthritis Other    • Thyroid disease Other        Social History     Social History   • Marital status:      Spouse name: N/A   • Number of children: N/A   • Years of education: N/A     Occupational History   • Not on file.     Social History Main Topics   • Smoking status: Current Every Day Smoker   • Smokeless tobacco: Never Used   • Alcohol use Yes   • Drug use: Yes     Special: Marijuana   • Sexual activity: No     Other Topics Concern   • Not on file     Social History Narrative         Physical Exam   Constitutional: She is oriented to person, place, and time. She appears well-developed and well-nourished. No distress.   HENT:   Head: Normocephalic.   Nose: Nose normal.   Mouth/Throat: Uvula is midline, oropharynx is clear and moist and mucous membranes are normal.       Eyes: EOM are normal. Pupils are equal, round, and reactive to light.   Neck: Normal range of motion. Neck supple. No thyromegaly present.   Hoarse voice   Cardiovascular: Normal rate and regular rhythm.    Pulmonary/Chest: Effort normal. No respiratory distress.   Abdominal: Soft.   Musculoskeletal: She exhibits no edema or deformity.   Neurological: She is alert and oriented to person, place, and time. Coordination normal.   Skin: Skin is warm and dry.   Psychiatric: She has a normal mood and affect. Her behavior is normal. Judgment and thought content normal.   Nursing note and vitals reviewed.        Assessment/Plan    Iris was seen today for gi problem, nicotine dependence, shoulder pain, arm pain and wheezing.    Diagnoses and all orders for this visit:    Fatigue, unspecified type  -     CBC & Differential  -     Vitamin B12  -     TSH+Free T4    Gastroesophageal reflux disease without esophagitis    Acquired hypothyroidism  -     TSH+Free T4    Obesity, morbid, BMI 40.0-49.9    Asthmatic bronchitis without complication, unspecified asthma severity, unspecified whether persistent  -     albuterol (PROVENTIL  HFA;VENTOLIN HFA) 108 (90 Base) MCG/ACT inhaler; Inhale 2 puffs Every 4 (Four) Hours As Needed for Wheezing.  -     Umeclidinium-Vilanterol (ANORO ELLIPTA) 62.5-25 MCG/INH aerosol powder ; Inhale 1 inhaler Daily.    Fibromyalgia    Cigarette smoker  -     CT Chest Low Dose Wo; Future    Encounter for screening for lung cancer  -     CT Chest Low Dose Wo; Future      Patient here for follow-up especially of hypothyroidism after resuming medication.  Reportedly has been on medication one month after losing previous prescription.  Also COPD symptoms and will add inhaler as noted above and follow-up in 2 months.  Strongly encouraged discontinuing cigarette use and will gets [place] CT scan as noted.    This note includes information entered using a voice recognition dictation system.  Though reviewed, some nonsensible errors may remain.

## 2017-10-11 LAB
BASOPHILS # BLD AUTO: 0.03 10*3/MM3 (ref 0–0.2)
BASOPHILS NFR BLD AUTO: 0.4 % (ref 0–1.5)
EOSINOPHIL # BLD AUTO: 0.27 10*3/MM3 (ref 0–0.7)
EOSINOPHIL NFR BLD AUTO: 3.7 % (ref 0.3–6.2)
ERYTHROCYTE [DISTWIDTH] IN BLOOD BY AUTOMATED COUNT: 14.2 % (ref 11.7–13)
HCT VFR BLD AUTO: 42.2 % (ref 35.6–45.5)
HGB BLD-MCNC: 13.4 G/DL (ref 11.9–15.5)
IMM GRANULOCYTES # BLD: 0 10*3/MM3 (ref 0–0.03)
IMM GRANULOCYTES NFR BLD: 0 % (ref 0–0.5)
LYMPHOCYTES # BLD AUTO: 3.68 10*3/MM3 (ref 0.9–4.8)
LYMPHOCYTES NFR BLD AUTO: 49.9 % (ref 19.6–45.3)
MCH RBC QN AUTO: 31.5 PG (ref 26.9–32)
MCHC RBC AUTO-ENTMCNC: 31.8 G/DL (ref 32.4–36.3)
MCV RBC AUTO: 99.3 FL (ref 80.5–98.2)
MONOCYTES # BLD AUTO: 0.7 10*3/MM3 (ref 0.2–1.2)
MONOCYTES NFR BLD AUTO: 9.5 % (ref 5–12)
NEUTROPHILS # BLD AUTO: 2.7 10*3/MM3 (ref 1.9–8.1)
NEUTROPHILS NFR BLD AUTO: 36.5 % (ref 42.7–76)
PLATELET # BLD AUTO: 236 10*3/MM3 (ref 140–500)
RBC # BLD AUTO: 4.25 10*6/MM3 (ref 3.9–5.2)
T4 FREE SERPL-MCNC: 2.17 NG/DL (ref 0.93–1.7)
TSH SERPL DL<=0.005 MIU/L-ACNC: 0.15 MIU/ML (ref 0.27–4.2)
VIT B12 SERPL-MCNC: 383 PG/ML (ref 211–946)
WBC # BLD AUTO: 7.38 10*3/MM3 (ref 4.5–10.7)

## 2017-10-20 ENCOUNTER — OFFICE VISIT (OUTPATIENT)
Dept: ORTHOPEDIC SURGERY | Facility: CLINIC | Age: 57
End: 2017-10-20

## 2017-10-20 VITALS — WEIGHT: 290.2 LBS | BODY MASS INDEX: 45.55 KG/M2 | TEMPERATURE: 97.8 F | HEIGHT: 67 IN

## 2017-10-20 DIAGNOSIS — S49.92XA SHOULDER INJURY, LEFT, INITIAL ENCOUNTER: Primary | ICD-10-CM

## 2017-10-20 PROCEDURE — 20610 DRAIN/INJ JOINT/BURSA W/O US: CPT | Performed by: ORTHOPAEDIC SURGERY

## 2017-10-20 PROCEDURE — 99212 OFFICE O/P EST SF 10 MIN: CPT | Performed by: ORTHOPAEDIC SURGERY

## 2017-10-20 PROCEDURE — 73030 X-RAY EXAM OF SHOULDER: CPT | Performed by: ORTHOPAEDIC SURGERY

## 2017-10-20 RX ORDER — METHYLPREDNISOLONE ACETATE 80 MG/ML
80 INJECTION, SUSPENSION INTRA-ARTICULAR; INTRALESIONAL; INTRAMUSCULAR; SOFT TISSUE
Status: COMPLETED | OUTPATIENT
Start: 2017-10-20 | End: 2017-10-20

## 2017-10-20 RX ORDER — BUPIVACAINE HYDROCHLORIDE 5 MG/ML
4 INJECTION, SOLUTION EPIDURAL; INTRACAUDAL
Status: COMPLETED | OUTPATIENT
Start: 2017-10-20 | End: 2017-10-20

## 2017-10-20 RX ADMIN — BUPIVACAINE HYDROCHLORIDE 4 ML: 5 INJECTION, SOLUTION EPIDURAL; INTRACAUDAL at 09:49

## 2017-10-20 RX ADMIN — METHYLPREDNISOLONE ACETATE 80 MG: 80 INJECTION, SUSPENSION INTRA-ARTICULAR; INTRALESIONAL; INTRAMUSCULAR; SOFT TISSUE at 09:49

## 2017-10-20 NOTE — PROGRESS NOTES
"Shoulder Follow Up      Patient: Iris Hyde        YOB: 1960            Chief Complaints: Left Shoulder pain  Chief Complaint   Patient presents with   • Left Shoulder - Follow-up, Pain           History of Present Illness:Patient is here follow-up of left shoulder pain I've seen her several months ago we did do an MRI which showed degenerative changes seen over quite some time she states she did fall recently in chart that shoulder she doesn't think she had a fracture but she started had increased pain and increased night pain her past medical history medications are all unchanged      Physical Exam: 57 y.o. female  General Appearance:    Alert, cooperative, in no acute distress                   Vitals:    10/20/17 0933   Temp: 97.8 °F (36.6 °C)   TempSrc: Temporal Artery    Weight: 290 lb 3.2 oz (132 kg)   Height: 67\" (170.2 cm)        Patient is alert and read ×3 no acute distress appears her above-listed at height weight and age.  Affect is normal respiratory rate is normal unlabored. Heart rate regular rate rhythm, sclera, dentition and hearing are normal for the purpose of this exam.    14 point review of system remarkable for shoulder pain the remainder are negative  Ortho Exam    Physical exam of the left shoulder reveals no overlying skin changes no lymphedema no lymphadenopathy.  Patient has active flexion 180 with  symptoms abduction is similar external rotation is to 40 and internal rotation to the upper lumbar spine with mild symptoms.  Patient has good rotator cuff strength 4+ over 5 with isometric strength testing with pain.  Patient has a positive impingement and a positive Mejia sign.  Patient has good cervical range of motion which is full and asymptomatic no radicular symptoms.  Patient has a normal elbow exam.  Good distal pulses are presentPatient has pain with overhead activity and a positive Neer sign and a positive empty can sign              Assessment/Plan:    Left " shoulder pain we know she had degenerative changes based on her last MRI think it's reasonable to inject glenohumeral joint if she fails improvement we could consider other means of testing she does describe some clear catching and locking she could have a cartilaginous piece that might be amenable to arthroscopy            Large Joint Arthrocentesis  Date/Time: 10/20/2017 9:49 AM  Consent given by: patient  Site marked: site marked  Timeout: Immediately prior to procedure a time out was called to verify the correct patient, procedure, equipment, support staff and site/side marked as required   Supporting Documentation  Indications: pain   Procedure Details  Location: shoulder - L glenohumeral  Preparation: Patient was prepped and draped in the usual sterile fashion  Needle size: 25 G  Approach: posterior  Medications administered: 80 mg methylPREDNISolone acetate 80 MG/ML; 4 mL bupivacaine (PF) 0.5 %  Patient tolerance: patient tolerated the procedure well with no immediate complications

## 2017-10-27 ENCOUNTER — HOSPITAL ENCOUNTER (OUTPATIENT)
Dept: PET IMAGING | Facility: HOSPITAL | Age: 57
Discharge: HOME | End: 2017-10-27
Admitting: CLINICAL NURSE SPECIALIST

## 2017-10-27 ENCOUNTER — CLINICAL SUPPORT (OUTPATIENT)
Dept: OTHER | Facility: HOSPITAL | Age: 57
End: 2017-10-27

## 2017-10-27 DIAGNOSIS — F17.210 SMOKING GREATER THAN 30 PACK YEARS: ICD-10-CM

## 2017-10-27 DIAGNOSIS — Z12.2 SCREENING FOR MALIGNANT NEOPLASM OF RESPIRATORY ORGAN: ICD-10-CM

## 2017-10-27 DIAGNOSIS — Z12.2 SCREENING FOR MALIGNANT NEOPLASM OF RESPIRATORY ORGAN: Primary | ICD-10-CM

## 2017-10-27 PROCEDURE — G0296 VISIT TO DETERM LDCT ELIG: HCPCS | Performed by: CLINICAL NURSE SPECIALIST

## 2017-10-27 PROCEDURE — G0297 LDCT FOR LUNG CA SCREEN: HCPCS

## 2017-11-01 RX ORDER — VENLAFAXINE HYDROCHLORIDE 75 MG/1
CAPSULE, EXTENDED RELEASE ORAL
Qty: 30 CAPSULE | Refills: 0 | Status: SHIPPED | OUTPATIENT
Start: 2017-11-01 | End: 2017-11-30 | Stop reason: SDUPTHER

## 2017-11-06 DIAGNOSIS — E03.9 ACQUIRED HYPOTHYROIDISM: ICD-10-CM

## 2017-11-06 RX ORDER — LEVOTHYROXINE SODIUM 300 UG/1
300 TABLET ORAL DAILY
Qty: 30 TABLET | Refills: 1 | Status: SHIPPED | OUTPATIENT
Start: 2017-11-06 | End: 2017-12-11 | Stop reason: SDUPTHER

## 2017-11-30 RX ORDER — VENLAFAXINE HYDROCHLORIDE 75 MG/1
CAPSULE, EXTENDED RELEASE ORAL
Qty: 30 CAPSULE | Refills: 0 | Status: SHIPPED | OUTPATIENT
Start: 2017-11-30 | End: 2018-01-01 | Stop reason: SDUPTHER

## 2017-12-08 ENCOUNTER — OFFICE VISIT (OUTPATIENT)
Dept: FAMILY MEDICINE CLINIC | Facility: CLINIC | Age: 57
End: 2017-12-08

## 2017-12-08 VITALS
DIASTOLIC BLOOD PRESSURE: 100 MMHG | TEMPERATURE: 97.9 F | SYSTOLIC BLOOD PRESSURE: 150 MMHG | HEART RATE: 81 BPM | WEIGHT: 278.8 LBS | OXYGEN SATURATION: 99 % | HEIGHT: 67 IN | BODY MASS INDEX: 43.76 KG/M2 | RESPIRATION RATE: 16 BRPM

## 2017-12-08 DIAGNOSIS — E66.01 OBESITY, MORBID, BMI 40.0-49.9 (HCC): ICD-10-CM

## 2017-12-08 DIAGNOSIS — I10 HYPERTENSION, UNSPECIFIED TYPE: ICD-10-CM

## 2017-12-08 DIAGNOSIS — Z79.899 HIGH RISK MEDICATION USE: ICD-10-CM

## 2017-12-08 DIAGNOSIS — E03.9 ACQUIRED HYPOTHYROIDISM: Primary | ICD-10-CM

## 2017-12-08 DIAGNOSIS — M79.7 FIBROMYALGIA: ICD-10-CM

## 2017-12-08 LAB
ALBUMIN SERPL-MCNC: 4 G/DL (ref 3.5–5.2)
ALBUMIN/GLOB SERPL: 1.4 G/DL
ALP SERPL-CCNC: 145 U/L (ref 39–117)
ALT SERPL-CCNC: 15 U/L (ref 1–33)
AST SERPL-CCNC: 19 U/L (ref 1–32)
BILIRUB SERPL-MCNC: 0.2 MG/DL (ref 0.1–1.2)
BUN SERPL-MCNC: 10 MG/DL (ref 6–20)
BUN/CREAT SERPL: 11.1 (ref 7–25)
CALCIUM SERPL-MCNC: 9 MG/DL (ref 8.6–10.5)
CHLORIDE SERPL-SCNC: 104 MMOL/L (ref 98–107)
CO2 SERPL-SCNC: 26 MMOL/L (ref 22–29)
CREAT SERPL-MCNC: 0.9 MG/DL (ref 0.57–1)
GFR SERPLBLD CREATININE-BSD FMLA CKD-EPI: 65 ML/MIN/1.73
GFR SERPLBLD CREATININE-BSD FMLA CKD-EPI: 78 ML/MIN/1.73
GLOBULIN SER CALC-MCNC: 2.9 GM/DL
GLUCOSE SERPL-MCNC: 113 MG/DL (ref 65–99)
POTASSIUM SERPL-SCNC: 3.6 MMOL/L (ref 3.5–5.2)
PROT SERPL-MCNC: 6.9 G/DL (ref 6–8.5)
SODIUM SERPL-SCNC: 143 MMOL/L (ref 136–145)
T4 FREE SERPL-MCNC: 2.05 NG/DL (ref 0.93–1.7)
TSH SERPL DL<=0.005 MIU/L-ACNC: 0.07 MIU/ML (ref 0.27–4.2)

## 2017-12-08 PROCEDURE — 99213 OFFICE O/P EST LOW 20 MIN: CPT | Performed by: FAMILY MEDICINE

## 2017-12-08 RX ORDER — HYDROCHLOROTHIAZIDE 12.5 MG/1
12.5 CAPSULE, GELATIN COATED ORAL EVERY MORNING
Qty: 30 CAPSULE | Refills: 5 | Status: SHIPPED | OUTPATIENT
Start: 2017-12-08 | End: 2018-07-20 | Stop reason: ALTCHOICE

## 2017-12-11 DIAGNOSIS — E03.9 ACQUIRED HYPOTHYROIDISM: ICD-10-CM

## 2017-12-11 RX ORDER — LEVOTHYROXINE SODIUM 300 UG/1
TABLET ORAL
Qty: 30 TABLET | Refills: 1
Start: 2017-12-11 | End: 2018-03-20 | Stop reason: SDUPTHER

## 2018-01-02 RX ORDER — VENLAFAXINE HYDROCHLORIDE 75 MG/1
CAPSULE, EXTENDED RELEASE ORAL
Qty: 30 CAPSULE | Refills: 0 | Status: SHIPPED | OUTPATIENT
Start: 2018-01-02 | End: 2018-04-08 | Stop reason: SDUPTHER

## 2018-03-19 ENCOUNTER — OFFICE VISIT (OUTPATIENT)
Dept: FAMILY MEDICINE CLINIC | Facility: CLINIC | Age: 58
End: 2018-03-19

## 2018-03-19 VITALS
DIASTOLIC BLOOD PRESSURE: 84 MMHG | OXYGEN SATURATION: 100 % | TEMPERATURE: 98.1 F | WEIGHT: 265 LBS | HEIGHT: 67 IN | HEART RATE: 72 BPM | SYSTOLIC BLOOD PRESSURE: 114 MMHG | BODY MASS INDEX: 41.59 KG/M2

## 2018-03-19 DIAGNOSIS — A08.4 VIRAL GASTROENTERITIS: ICD-10-CM

## 2018-03-19 DIAGNOSIS — M79.7 FIBROMYALGIA: ICD-10-CM

## 2018-03-19 DIAGNOSIS — E03.9 ACQUIRED HYPOTHYROIDISM: Primary | ICD-10-CM

## 2018-03-19 PROCEDURE — 99213 OFFICE O/P EST LOW 20 MIN: CPT | Performed by: FAMILY MEDICINE

## 2018-03-19 NOTE — PROGRESS NOTES
HPI  Iris Hyde is a 57 y.o. female who is here for nausea vomiting and diarrhea that started 2 days ago.  She lives with her brother who apparently is in the hospital at this time with similar symptoms including elevated liver enzymes.  Patient works in a nursing home.  She reports significant diarrhea mostly and apparently has had some stomach issues in the past.  Also of note recently decreased thyroid supplement because of lab work done 3 months ago.       Review of Systems   Constitutional: Positive for chills and fatigue. Negative for diaphoresis and fever.   HENT: Negative for congestion.    Respiratory: Negative for shortness of breath.    Cardiovascular: Negative for chest pain and leg swelling.   Gastrointestinal: Positive for abdominal pain, diarrhea, nausea and vomiting.         Past Medical History:   Diagnosis Date   • Alkaline phosphatase elevation    • Alopecia    • Anxiety    • Arthritis    • Asthmatic bronchitis    • Bladder spasms    • Chronic low back pain    • Colitis    • Depression    • Eczema    • Esophageal reflux    • Fibromyalgia    • Headache    • High risk medication use    • Migraine headache    • Neoplasm of uncertain behavior of breast    • Skin cancer    • Sleep apnea with use of continuous positive airway pressure (CPAP)    • Thyroid disorder    • Urinary frequency    • Urinary pain        Past Surgical History:   Procedure Laterality Date   • APPENDECTOMY  1980    DR. MATIAS   • CHOLECYSTECTOMY  1989    DR. IGOR FUNES   • CRANIOTOMY  1996   • TUBAL ABDOMINAL LIGATION  1982       Family History   Problem Relation Age of Onset   • Heart disease Other    • Diabetes Other    • Alcohol abuse Other    • Anxiety disorder Other    • Bipolar disorder Other    • Cancer Other    • Depression Other    • Lung disease Other    • Kidney disease Other    • Rheum arthritis Other    • Thyroid disease Other        Social History     Social History   • Marital status:      Spouse name:  N/A   • Number of children: N/A   • Years of education: N/A     Occupational History   • Not on file.     Social History Main Topics   • Smoking status: Current Every Day Smoker   • Smokeless tobacco: Never Used   • Alcohol use Yes   • Drug use:      Types: Marijuana   • Sexual activity: No     Other Topics Concern   • Not on file     Social History Narrative   • No narrative on file         Physical Exam   Constitutional: She is oriented to person, place, and time. She appears well-developed and well-nourished.   HENT:   Head: Normocephalic.   Nose: Nose normal.   Mouth/Throat: Oropharynx is clear and moist.   Eyes: Conjunctivae are normal. Pupils are equal, round, and reactive to light. No scleral icterus.   Neck: Normal range of motion.   Cardiovascular: Normal rate and regular rhythm.    Pulmonary/Chest: Effort normal. No respiratory distress.   Abdominal: Soft. She exhibits no distension and no mass. There is no tenderness.   Musculoskeletal: She exhibits no edema or deformity.   Lymphadenopathy:     She has no cervical adenopathy.   Neurological: She is alert and oriented to person, place, and time. Coordination normal.   Skin: Skin is warm and dry.   Psychiatric: She has a normal mood and affect. Her behavior is normal. Judgment and thought content normal.   Vitals reviewed.        Assessment/Plan    Iris was seen today for fatigue, vomiting and diarrhea.    Diagnoses and all orders for this visit:    Acquired hypothyroidism  -     TSH+Free T4    Viral gastroenteritis  -     CBC & Differential  -     Comprehensive Metabolic Panel    Fibromyalgia      Patient mostly presents with vomiting and diarrhea for the last 2 days.  Examination is unremarkable and there is no icterus noted.  No reported dark urine or other symptoms suspicious for hepatitis.  However will get routine lab including liver enzymes.  Also recheck thyroid levels since decreased thyroid supplement several months ago.  Of note Brother reported  in the hospital at this time also with nausea vomiting and diarrhea?    This note includes information entered using a voice recognition dictation system.  Though reviewed, some nonsensible errors may remain.

## 2018-03-20 DIAGNOSIS — E03.9 ACQUIRED HYPOTHYROIDISM: ICD-10-CM

## 2018-03-20 LAB
ALBUMIN SERPL-MCNC: 3.9 G/DL (ref 3.5–5.5)
ALBUMIN/GLOB SERPL: 1.4 {RATIO} (ref 1.2–2.2)
ALP SERPL-CCNC: 144 IU/L (ref 39–117)
ALT SERPL-CCNC: 22 IU/L (ref 0–32)
AST SERPL-CCNC: 29 IU/L (ref 0–40)
BASOPHILS # BLD AUTO: 0 X10E3/UL (ref 0–0.2)
BASOPHILS NFR BLD AUTO: 0 %
BILIRUB SERPL-MCNC: <0.2 MG/DL (ref 0–1.2)
BUN SERPL-MCNC: 9 MG/DL (ref 6–24)
BUN/CREAT SERPL: 13 (ref 9–23)
CALCIUM SERPL-MCNC: 8.9 MG/DL (ref 8.7–10.2)
CHLORIDE SERPL-SCNC: 103 MMOL/L (ref 96–106)
CO2 SERPL-SCNC: 24 MMOL/L (ref 18–29)
CREAT SERPL-MCNC: 0.7 MG/DL (ref 0.57–1)
EOSINOPHIL # BLD AUTO: 0.1 X10E3/UL (ref 0–0.4)
EOSINOPHIL NFR BLD AUTO: 1 %
ERYTHROCYTE [DISTWIDTH] IN BLOOD BY AUTOMATED COUNT: 13.8 % (ref 12.3–15.4)
GFR SERPLBLD CREATININE-BSD FMLA CKD-EPI: 112 ML/MIN/1.73
GFR SERPLBLD CREATININE-BSD FMLA CKD-EPI: 97 ML/MIN/1.73
GLOBULIN SER CALC-MCNC: 2.7 G/DL (ref 1.5–4.5)
GLUCOSE SERPL-MCNC: 112 MG/DL (ref 65–99)
HCT VFR BLD AUTO: 40.3 % (ref 34–46.6)
HGB BLD-MCNC: 13.5 G/DL (ref 11.1–15.9)
IMM GRANULOCYTES # BLD: 0 X10E3/UL (ref 0–0.1)
IMM GRANULOCYTES NFR BLD: 0 %
LYMPHOCYTES # BLD AUTO: 4.4 X10E3/UL (ref 0.7–3.1)
LYMPHOCYTES NFR BLD AUTO: 54 %
MCH RBC QN AUTO: 31.8 PG (ref 26.6–33)
MCHC RBC AUTO-ENTMCNC: 33.5 G/DL (ref 31.5–35.7)
MCV RBC AUTO: 95 FL (ref 79–97)
MONOCYTES # BLD AUTO: 0.6 X10E3/UL (ref 0.1–0.9)
MONOCYTES NFR BLD AUTO: 8 %
NEUTROPHILS # BLD AUTO: 3 X10E3/UL (ref 1.4–7)
NEUTROPHILS NFR BLD AUTO: 37 %
PLATELET # BLD AUTO: 248 X10E3/UL (ref 150–379)
POTASSIUM SERPL-SCNC: 3.4 MMOL/L (ref 3.5–5.2)
PROT SERPL-MCNC: 6.6 G/DL (ref 6–8.5)
RBC # BLD AUTO: 4.24 X10E6/UL (ref 3.77–5.28)
SODIUM SERPL-SCNC: 142 MMOL/L (ref 134–144)
T4 FREE SERPL-MCNC: 1.88 NG/DL (ref 0.82–1.77)
TSH SERPL DL<=0.005 MIU/L-ACNC: 0.03 UIU/ML (ref 0.45–4.5)
WBC # BLD AUTO: 8.2 X10E3/UL (ref 3.4–10.8)

## 2018-03-20 RX ORDER — LEVOTHYROXINE SODIUM 0.2 MG/1
200 TABLET ORAL DAILY
Qty: 90 TABLET | Refills: 3
Start: 2018-03-20 | End: 2018-10-03 | Stop reason: SDUPTHER

## 2018-03-29 RX ORDER — CYCLOBENZAPRINE HCL 10 MG
TABLET ORAL
Qty: 90 TABLET | Refills: 0 | Status: SHIPPED | OUTPATIENT
Start: 2018-03-29 | End: 2018-07-15 | Stop reason: SDUPTHER

## 2018-04-03 NOTE — PROGRESS NOTES
"[facility] Low-Dose Lung Cancer CT Screening Visit    Iris Hyde is a pleasant 57 y.o. female seen today at the request of Dr. Willis in our Multidisciplinary Clinic, being seen for Lung Cancer Screening Counseling and a Shared Decision Making Visit prior to Low-Dose Lung Cancer Screening CT exam.    SMOKING HISTORY:   History   Smoking Status   • Current Every Day Smoker   • Packs/day: 1.00   • Years: 43.00   Smokeless Tobacco   • Never Used     Comment: Began smoking age 14.  Smoked 1 ppd for 43 pack year history.       Iris Hyde is currently smoking 1 pack per day with a 43 pack year history.  Reports no use of alternate forms of tobacco, or other substances.  She does use marijuana daily for \"pain management\".  She used electronic cigarettes for a couple of weeks but is not using currently.  Based on the recommendation of the [place] Preventive Services Task Force, this patient is at high risk for lung cancer and a low-dose CT screening scan is recommended.     We discussed the connection between Radon and Lung Cancer and the availability of free test kits.  She has a basement that has not been tested for Radon.  Some second-hand smoke exposure as a child with her father smoking in their home.    The patient has had no hemoptysis, unintentional weight loss or increasing shortness of breath. The patient is asymptomatic and has no signs or symptoms of lung cancer.   She has sleep apnea but is not currently using CPAP due to nasal congestion.  The patient reports a personal history of basal cell cancer (face).  Additionally, reports no family history of lung cancer.  She does have emphysema.    Together we discussed the potential benefits and potential harms of being screened for lung cancer including the potential for follow-up diagnostic testing, risk for over diagnosis, false positive rate and total radiation exposure using the [facility] standardized decision aid. " We reviewed the patient's smoking history and counseled on the importance and health benefits of quitting smoking.    Smoking Cessation  DISCUSSION HELD TODAY:     We discussed that there are a number of resources and tobacco cessation interventions to assist with smoking cessation including the 1-800-Quit Now line, Health Department programs, [place] [facility] resources, and use of the [place] Department of Health and Human Services five keys for quitting and quit plan worksheet.  On a scale of zero to ten, the patient rates their motivation and readiness to quit at a 8.5 out of 10 today.  She has had multiple attempts at quitting smoking using Chantix which caused her to vomit, hypnosis that did not work, patches causing skin sensitivities and a nicotrol inhaler.    Recommendations for continued lung cancer screening:      We discussed the NCCN guidelines for lung cancer screening and the patient verbalized understanding that annual screening is recommended until fifteen years beyond smoking as long as they have no other disease or comorbidity that would prevent them from receiving cancer treatments such as surgery should a lung cancer be detected. The [facility] Lung Cancer Screening Shared Decision-Making Tool was utilized as an aid in discussing whether or not screening was right. The patient has decided to proceed with a Low Dose Lung Cancer Screening CT today. The patient is aware that the results of his screening will be shared with the referring provider or PCP as well.       The patient verbalizes understanding that results of this low dose lung CT exam will be called and that assistance will be provided in arranging any necessary follow-up.    After review of the NCCN guidelines and recommendations for ongoing screening, the patient verbalized understanding of recommendations for follow-up. We discussed the importance of adherence to continued annual screening until 15 years beyond smoking or until  other life-limiting comorbidities are present. We discussed the impact of comorbidities and ability and willing to undergo diagnosis and treatment.    The patient has been counseled on the health benefits of quitting tobacco, smoking cessation strategies and resources, as well as the importance of adherence to annual lung cancer low-dose CT screening.     Haimda Guerrero, MSN, APRN, ACNS-BC, AOCN, CHPN  Clinical Nurse Specialist  [facility]

## 2018-04-07 VITALS
HEART RATE: 73 BPM | DIASTOLIC BLOOD PRESSURE: 91 MMHG | WEIGHT: 277 LBS | RESPIRATION RATE: 18 BRPM | BODY MASS INDEX: 43.47 KG/M2 | SYSTOLIC BLOOD PRESSURE: 179 MMHG | TEMPERATURE: 97.7 F | HEIGHT: 67 IN

## 2018-04-08 DIAGNOSIS — E03.9 ACQUIRED HYPOTHYROIDISM: ICD-10-CM

## 2018-04-09 RX ORDER — LEVOTHYROXINE SODIUM 300 UG/1
300 TABLET ORAL DAILY
Qty: 30 TABLET | Refills: 0 | Status: SHIPPED | OUTPATIENT
Start: 2018-04-09 | End: 2018-07-20

## 2018-04-09 RX ORDER — VENLAFAXINE HYDROCHLORIDE 75 MG/1
CAPSULE, EXTENDED RELEASE ORAL
Qty: 30 CAPSULE | Refills: 0 | Status: SHIPPED | OUTPATIENT
Start: 2018-04-09 | End: 2018-05-22 | Stop reason: SDUPTHER

## 2018-05-22 RX ORDER — VENLAFAXINE HYDROCHLORIDE 75 MG/1
CAPSULE, EXTENDED RELEASE ORAL
Qty: 30 CAPSULE | Refills: 0 | Status: SHIPPED | OUTPATIENT
Start: 2018-05-22 | End: 2018-06-21 | Stop reason: SDUPTHER

## 2018-06-21 RX ORDER — VENLAFAXINE HYDROCHLORIDE 75 MG/1
CAPSULE, EXTENDED RELEASE ORAL
Qty: 30 CAPSULE | Refills: 0 | Status: SHIPPED | OUTPATIENT
Start: 2018-06-21 | End: 2018-07-15 | Stop reason: SDUPTHER

## 2018-07-15 DIAGNOSIS — E03.9 ACQUIRED HYPOTHYROIDISM: ICD-10-CM

## 2018-07-16 RX ORDER — CYCLOBENZAPRINE HCL 10 MG
TABLET ORAL
Qty: 90 TABLET | Refills: 0 | Status: SHIPPED | OUTPATIENT
Start: 2018-07-16 | End: 2018-08-17 | Stop reason: SDUPTHER

## 2018-07-16 RX ORDER — LEVOTHYROXINE SODIUM 0.2 MG/1
200 TABLET ORAL DAILY
Qty: 90 TABLET | Refills: 0 | Status: SHIPPED | OUTPATIENT
Start: 2018-07-16 | End: 2018-07-19 | Stop reason: SDUPTHER

## 2018-07-16 RX ORDER — VENLAFAXINE HYDROCHLORIDE 75 MG/1
CAPSULE, EXTENDED RELEASE ORAL
Qty: 30 CAPSULE | Refills: 0 | Status: SHIPPED | OUTPATIENT
Start: 2018-07-16 | End: 2018-08-12 | Stop reason: SDUPTHER

## 2018-07-20 ENCOUNTER — OFFICE VISIT (OUTPATIENT)
Dept: FAMILY MEDICINE CLINIC | Facility: CLINIC | Age: 58
End: 2018-07-20

## 2018-07-20 VITALS
HEART RATE: 70 BPM | BODY MASS INDEX: 42.38 KG/M2 | TEMPERATURE: 98.4 F | HEIGHT: 67 IN | OXYGEN SATURATION: 98 % | WEIGHT: 270 LBS | SYSTOLIC BLOOD PRESSURE: 146 MMHG | DIASTOLIC BLOOD PRESSURE: 78 MMHG

## 2018-07-20 DIAGNOSIS — E66.01 OBESITY, MORBID, BMI 40.0-49.9 (HCC): ICD-10-CM

## 2018-07-20 DIAGNOSIS — F17.210 CIGARETTE SMOKER: ICD-10-CM

## 2018-07-20 DIAGNOSIS — E03.9 ACQUIRED HYPOTHYROIDISM: ICD-10-CM

## 2018-07-20 DIAGNOSIS — F41.8 MIXED ANXIETY DEPRESSIVE DISORDER: ICD-10-CM

## 2018-07-20 DIAGNOSIS — G43.909 MIGRAINE WITHOUT STATUS MIGRAINOSUS, NOT INTRACTABLE, UNSPECIFIED MIGRAINE TYPE: Primary | ICD-10-CM

## 2018-07-20 PROCEDURE — 99213 OFFICE O/P EST LOW 20 MIN: CPT | Performed by: FAMILY MEDICINE

## 2018-07-20 RX ORDER — LOSARTAN POTASSIUM AND HYDROCHLOROTHIAZIDE 12.5; 5 MG/1; MG/1
1 TABLET ORAL DAILY
Qty: 30 TABLET | Refills: 5 | Status: SHIPPED | OUTPATIENT
Start: 2018-07-20 | End: 2018-09-25 | Stop reason: SDUPTHER

## 2018-07-20 RX ORDER — PROMETHAZINE HYDROCHLORIDE 25 MG/1
25 TABLET ORAL EVERY 4 HOURS PRN
Qty: 100 TABLET | Refills: 2 | Status: SHIPPED | OUTPATIENT
Start: 2018-07-20 | End: 2019-06-10 | Stop reason: SDUPTHER

## 2018-07-20 NOTE — PROGRESS NOTES
HPI  Iris Hyde is a 58 y.o. female who is here for follow up of hypertension and cigarette use.  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  Also wishes to quit cigarette use.  Hopes to lose weight.  Does walk a lot and is very active in her job.  Otherwise has no new complaints.      Review of Systems   All other systems reviewed and are negative.        Past Medical History:   Diagnosis Date   • Alkaline phosphatase elevation    • Alopecia    • Anxiety    • Arthritis    • Asthmatic bronchitis    • Bladder spasms    • Chronic low back pain    • Colitis    • Depression    • Eczema    • Esophageal reflux    • Fibromyalgia    • Headache    • High risk medication use    • Migraine headache    • Neoplasm of uncertain behavior of breast    • Skin cancer    • Sleep apnea with use of continuous positive airway pressure (CPAP)    • Thyroid disorder    • Urinary frequency    • Urinary pain        Past Surgical History:   Procedure Laterality Date   • APPENDECTOMY  1980    DR. MATIAS   • CHOLECYSTECTOMY  1989    DR. IGOR FUNES   • COLONOSCOPY  09/20/2013    Two 8-9mm polyps in the rectum and in the sigmoid colon, 5mm polyp in the transverse colon, two 2-3mm polyps in the sigmoid colon, NBIH.  PATH: Tubular adenom, hyperplastic changes.    • CRANIOTOMY  1996   • ENDOSCOPY  08/29/2013    LA Grade A reflux esophagitis, HH, erosive gastritis, A single small papule with no stigmata of recent bleeding was found.  PATH:Benign.    • TUBAL ABDOMINAL LIGATION  1982       Family History   Problem Relation Age of Onset   • Heart disease Other    • Diabetes Other    • Alcohol abuse Other    • Anxiety disorder Other    • Bipolar disorder Other    • Cancer Other    • Depression Other    • Lung disease Other    • Kidney disease Other    • Rheum arthritis Other    • Thyroid disease Other        Social History     Social History   • Marital status:      Spouse  name: N/A   • Number of children: N/A   • Years of education: N/A     Occupational History   • Not on file.     Social History Main Topics   • Smoking status: Current Every Day Smoker     Packs/day: 1.00     Years: 43.00   • Smokeless tobacco: Never Used      Comment: Began smoking age 14.  Smoked 1 ppd for 43 pack year history.   • Alcohol use Yes      Comment: Rare   • Drug use: Yes     Types: Marijuana      Comment: Daily for pain management   • Sexual activity: No     Other Topics Concern   • Not on file     Social History Narrative   • No narrative on file         Physical Exam   Constitutional: She appears well-developed and well-nourished. No distress.   HENT:   Mouth/Throat: Oropharynx is clear and moist.   Eyes: Pupils are equal, round, and reactive to light. Conjunctivae and EOM are normal.   Neck: No thyromegaly present.   Cardiovascular: Normal rate and regular rhythm.    Pulmonary/Chest: Effort normal. No respiratory distress. She has no wheezes. She has no rales.   Abdominal: Soft.   Musculoskeletal: She exhibits no edema or deformity.   Neurological: She is alert. Coordination normal.   Skin: Skin is warm and dry.   Psychiatric: She has a normal mood and affect. Her behavior is normal. Judgment and thought content normal.   Nursing note and vitals reviewed.        Assessment/Plan    Iris was seen today for hypertension and nicotine dependence.    Diagnoses and all orders for this visit:    Migraine without status migrainosus, not intractable, unspecified migraine type    Acquired hypothyroidism    Obesity, morbid, BMI 40.0-49.9 (CMS/[facility])    Cigarette smoker    Mixed anxiety depressive disorder    Other orders  -     losartan-hydrochlorothiazide (HYZAAR) 50-12.5 MG per tablet; Take 1 tablet by mouth Daily.  -     promethazine (PHENERGAN) 25 MG tablet; Take 1 tablet by mouth Every 4 (Four) Hours As Needed for Nausea.  -     varenicline (CHANTIX STARTING MONTH PAK) 0.5 MG X 11 & 1 MG X 42 tablet; Take  0.5 mg one daily on days 1-3 and and 0.5 mg twice daily on days 4-7.Then 1 mg twice daily for a total of 12 weeks.        Patient is here for follow-up of hypertension.  Blood pressure does remain slightly above goal and discussed changing to a combination medication.  Also requesting Chantix and this was strongly encouraged.  Has had CT screening and again discussed multiple risks associated with smoking.  Recently decreased thyroid supplement, patient does not wish to get follow-up lab work at this time.  Will recheck at next visit in 3 months.  Again strongly encouraged to discontinue smoking and call for continuation Chantix next month.  Also continue healthy diet and exercise.    This note includes information entered using a voice recognition dictation system.  Though reviewed, some nonsensible errors may remain.

## 2018-07-23 ENCOUNTER — OFFICE VISIT (OUTPATIENT)
Dept: GASTROENTEROLOGY | Facility: CLINIC | Age: 58
End: 2018-07-23

## 2018-07-23 VITALS
TEMPERATURE: 98.1 F | SYSTOLIC BLOOD PRESSURE: 130 MMHG | HEIGHT: 67 IN | WEIGHT: 267.8 LBS | BODY MASS INDEX: 42.03 KG/M2 | DIASTOLIC BLOOD PRESSURE: 84 MMHG

## 2018-07-23 DIAGNOSIS — K21.00 GASTROESOPHAGEAL REFLUX DISEASE WITH ESOPHAGITIS: Primary | ICD-10-CM

## 2018-07-23 DIAGNOSIS — D36.9 ADENOMATOUS POLYPS: ICD-10-CM

## 2018-07-23 DIAGNOSIS — K31.84 GASTROPARESIS: ICD-10-CM

## 2018-07-23 PROCEDURE — 99204 OFFICE O/P NEW MOD 45 MIN: CPT | Performed by: INTERNAL MEDICINE

## 2018-07-23 RX ORDER — NAPROXEN SODIUM 220 MG
220 TABLET ORAL DAILY PRN
COMMUNITY
End: 2018-12-09 | Stop reason: HOSPADM

## 2018-07-23 RX ORDER — DEXLANSOPRAZOLE 60 MG/1
60 CAPSULE, DELAYED RELEASE ORAL DAILY
Qty: 30 CAPSULE | Refills: 5 | Status: SHIPPED | OUTPATIENT
Start: 2018-07-23 | End: 2018-08-22

## 2018-07-23 RX ORDER — IBUPROFEN 200 MG
200 TABLET ORAL EVERY 6 HOURS PRN
COMMUNITY
End: 2020-01-28

## 2018-07-23 NOTE — PROGRESS NOTES
Chief Complaint   Patient presents with   • GI Problem     Gastroparesis    • Vomiting     Iris Hyde is a 58 y.o. female who presents with gastroparesis, GERD, history of precancerous polyps.  She has had episodes of n/v - this causes diarrhea and sometimes associated with incontinence.  No blood in stool.  She uses phenergan prn if she can keep it down.  She takes nsaids regularly for arthritis.  Her heartburn is well controlled.  When she vomits it is old food.  She feels like she did better on dexilant.    She is eating a low residue diet - she eats small meals.  Often eats once a day. Dairy seems to bother her.    HPI  Past Medical History:   Diagnosis Date   • Alkaline phosphatase elevation    • Alopecia    • Anxiety    • Arthritis    • Asthmatic bronchitis    • Bladder spasms    • Chronic low back pain    • Colitis    • Depression    • Eczema    • Esophageal reflux    • Fibromyalgia    • Headache    • High risk medication use    • Migraine headache    • Neoplasm of uncertain behavior of breast    • Skin cancer    • Sleep apnea with use of continuous positive airway pressure (CPAP)    • Thyroid disorder    • Urinary frequency    • Urinary pain      Past Surgical History:   Procedure Laterality Date   • APPENDECTOMY  1980    DR. MATIAS   • CHOLECYSTECTOMY  1989    DR. IGOR FUNES   • COLONOSCOPY  09/20/2013    Two 8-9mm polyps in the rectum and in the sigmoid colon, 5mm polyp in the transverse colon, two 2-3mm polyps in the sigmoid colon, NBIH.  PATH: Tubular adenom, hyperplastic changes.    • CRANIOTOMY  1996   • ENDOSCOPY  08/29/2013    LA Grade A reflux esophagitis, HH, erosive gastritis, A single small papule with no stigmata of recent bleeding was found.  PATH:Benign.    • TUBAL ABDOMINAL LIGATION  1982       Current Outpatient Prescriptions:   •  albuterol (PROVENTIL HFA;VENTOLIN HFA) 108 (90 Base) MCG/ACT inhaler, Inhale 2 puffs Every 4 (Four) Hours As Needed for Wheezing., Disp: 1 inhaler, Rfl:  11  •  cyclobenzaprine (FLEXERIL) 10 MG tablet, TAKE 1 TABLET BY MOUTH THREE TIMES DAILY, Disp: 90 tablet, Rfl: 0  •  ibuprofen (ADVIL,MOTRIN) 200 MG tablet, Take 200 mg by mouth Every 6 (Six) Hours As Needed for Mild Pain ., Disp: , Rfl:   •  levothyroxine (SYNTHROID, LEVOTHROID) 200 MCG tablet, Take 1 tablet by mouth Daily., Disp: 90 tablet, Rfl: 3  •  losartan-hydrochlorothiazide (HYZAAR) 50-12.5 MG per tablet, Take 1 tablet by mouth Daily., Disp: 30 tablet, Rfl: 5  •  naproxen sodium (ALEVE) 220 MG tablet, Take 220 mg by mouth Daily As Needed., Disp: , Rfl:   •  pantoprazole (PROTONIX) 40 MG EC tablet, Take 1 tablet by mouth Daily., Disp: 90 tablet, Rfl: 3  •  promethazine (PHENERGAN) 25 MG tablet, Take 1 tablet by mouth Every 4 (Four) Hours As Needed for Nausea., Disp: 100 tablet, Rfl: 2  •  venlafaxine XR (EFFEXOR-XR) 75 MG 24 hr capsule, TAKE 1 CAPSULE BY MOUTH EVERY DAY WITH FOOD, Disp: 30 capsule, Rfl: 0  •  dexlansoprazole (DEXILANT) 60 MG capsule, Take 1 capsule by mouth Daily for 30 days., Disp: 30 capsule, Rfl: 5  •  varenicline (CHANTIX STARTING MONTH PAK) 0.5 MG X 11 & 1 MG X 42 tablet, Take 0.5 mg one daily on days 1-3 and and 0.5 mg twice daily on days 4-7.Then 1 mg twice daily for a total of 12 weeks., Disp: 53 tablet, Rfl: 0  Allergies   Allergen Reactions   • Butorphanol    • Cymbalta [Duloxetine Hcl]    • Hydrocodone-Acetaminophen      norco      Social History     Social History   • Marital status:      Spouse name: N/A   • Number of children: N/A   • Years of education: N/A     Occupational History   • Not on file.     Social History Main Topics   • Smoking status: Current Every Day Smoker     Packs/day: 1.00     Years: 43.00   • Smokeless tobacco: Never Used      Comment: Began smoking age 14.  Smoked 1 ppd for 43 pack year history.   • Alcohol use Yes      Comment: Rare   • Drug use: Yes     Types: Marijuana      Comment: Daily for pain management   • Sexual activity: No     Other  Topics Concern   • Not on file     Social History Narrative   • No narrative on file     Family History   Problem Relation Age of Onset   • Heart disease Other    • Diabetes Other    • Alcohol abuse Other    • Anxiety disorder Other    • Bipolar disorder Other    • Cancer Other    • Depression Other    • Lung disease Other    • Kidney disease Other    • Rheum arthritis Other    • Thyroid disease Other      Review of Systems   Constitutional: Negative for appetite change and unexpected weight change.   Gastrointestinal: Positive for abdominal distention, constipation, diarrhea, nausea and vomiting.   Musculoskeletal: Positive for arthralgias and myalgias.   All other systems reviewed and are negative.    Vitals:    07/23/18 1001   BP: 130/84   Temp: 98.1 °F (36.7 °C)     1    07/23/18  1001   Weight: 121 kg (267 lb 12.8 oz)     Physical Exam   Constitutional: She appears well-developed and well-nourished.   HENT:   Head: Normocephalic and atraumatic.   Eyes: No scleral icterus.   Abdominal: Soft. She exhibits no distension and no mass. There is no tenderness.   Neurological: She is alert.   Skin: Skin is warm and dry.   Psychiatric: She has a normal mood and affect.     No images are attached to the encounter.  No notes on file  Iris was seen today for gi problem and vomiting.    Diagnoses and all orders for this visit:    Gastroesophageal reflux disease with esophagitis  -     Case Request; Standing  -     Case Request    Gastroparesis  -     Case Request; Standing  -     Case Request    Adenomatous polyps  -     Case Request; Standing  -     Case Request    Other orders  -     Follow Anesthesia Guidelines / Standing Orders; Future  -     Implement Anesthesia orders day of procedure.; Standing  -     Obtain informed consent; Standing  -     Verify bowel prep was successful; Standing  -     Give tap water enema if bowel prep was insufficient; Standing  -     dexlansoprazole (DEXILANT) 60 MG capsule; Take 1 capsule  by mouth Daily for 30 days.         Plan:  - avoid nsaids starting 1 week prior to scopes  - schedule egd and c/s for further evaluation  - trial resuming dexilant  - discussed diet modification  - start fiber supplement daily

## 2018-07-24 ENCOUNTER — DOCUMENTATION (OUTPATIENT)
Dept: GASTROENTEROLOGY | Facility: CLINIC | Age: 58
End: 2018-07-24

## 2018-07-24 NOTE — PROGRESS NOTES
PA submitted for Dexilant 60Mg through [facility]  PA has been approved through [facility]

## 2018-08-13 RX ORDER — VENLAFAXINE HYDROCHLORIDE 75 MG/1
CAPSULE, EXTENDED RELEASE ORAL
Qty: 30 CAPSULE | Refills: 0 | Status: SHIPPED | OUTPATIENT
Start: 2018-08-13 | End: 2018-09-25 | Stop reason: SDUPTHER

## 2018-08-17 RX ORDER — CYCLOBENZAPRINE HCL 10 MG
TABLET ORAL
Qty: 90 TABLET | Refills: 3 | Status: SHIPPED | OUTPATIENT
Start: 2018-08-17 | End: 2019-04-12 | Stop reason: SDUPTHER

## 2018-08-21 ENCOUNTER — HOSPITAL ENCOUNTER (OUTPATIENT)
Facility: HOSPITAL | Age: 58
Setting detail: HOSPITAL OUTPATIENT SURGERY
Discharge: HOME | End: 2018-08-21
Attending: INTERNAL MEDICINE | Admitting: INTERNAL MEDICINE

## 2018-08-21 ENCOUNTER — ANESTHESIA EVENT (OUTPATIENT)
Dept: GASTROENTEROLOGY | Facility: HOSPITAL | Age: 58
End: 2018-08-21

## 2018-08-21 ENCOUNTER — ANESTHESIA (OUTPATIENT)
Dept: GASTROENTEROLOGY | Facility: HOSPITAL | Age: 58
End: 2018-08-21

## 2018-08-21 VITALS
SYSTOLIC BLOOD PRESSURE: 116 MMHG | DIASTOLIC BLOOD PRESSURE: 79 MMHG | BODY MASS INDEX: 40.41 KG/M2 | WEIGHT: 258 LBS | RESPIRATION RATE: 16 BRPM | TEMPERATURE: 98 F | OXYGEN SATURATION: 100 % | HEART RATE: 62 BPM

## 2018-08-21 DIAGNOSIS — K21.00 GASTROESOPHAGEAL REFLUX DISEASE WITH ESOPHAGITIS: ICD-10-CM

## 2018-08-21 DIAGNOSIS — D36.9 ADENOMATOUS POLYPS: ICD-10-CM

## 2018-08-21 DIAGNOSIS — K31.84 GASTROPARESIS: ICD-10-CM

## 2018-08-21 PROCEDURE — 43239 EGD BIOPSY SINGLE/MULTIPLE: CPT | Performed by: INTERNAL MEDICINE

## 2018-08-21 PROCEDURE — 88312 SPECIAL STAINS GROUP 1: CPT | Performed by: INTERNAL MEDICINE

## 2018-08-21 PROCEDURE — S0260 H&P FOR SURGERY: HCPCS | Performed by: INTERNAL MEDICINE

## 2018-08-21 PROCEDURE — 45385 COLONOSCOPY W/LESION REMOVAL: CPT | Performed by: INTERNAL MEDICINE

## 2018-08-21 PROCEDURE — 88305 TISSUE EXAM BY PATHOLOGIST: CPT | Performed by: INTERNAL MEDICINE

## 2018-08-21 PROCEDURE — 25010000002 PROPOFOL 10 MG/ML EMULSION: Performed by: ANESTHESIOLOGY

## 2018-08-21 PROCEDURE — 45380 COLONOSCOPY AND BIOPSY: CPT | Performed by: INTERNAL MEDICINE

## 2018-08-21 RX ORDER — PROPOFOL 10 MG/ML
VIAL (ML) INTRAVENOUS AS NEEDED
Status: DISCONTINUED | OUTPATIENT
Start: 2018-08-21 | End: 2018-08-21 | Stop reason: SURG

## 2018-08-21 RX ORDER — SODIUM CHLORIDE, SODIUM LACTATE, POTASSIUM CHLORIDE, CALCIUM CHLORIDE 600; 310; 30; 20 MG/100ML; MG/100ML; MG/100ML; MG/100ML
1000 INJECTION, SOLUTION INTRAVENOUS CONTINUOUS
Status: DISCONTINUED | OUTPATIENT
Start: 2018-08-21 | End: 2018-08-21 | Stop reason: HOSPADM

## 2018-08-21 RX ORDER — LIDOCAINE HYDROCHLORIDE 20 MG/ML
INJECTION, SOLUTION INFILTRATION; PERINEURAL AS NEEDED
Status: DISCONTINUED | OUTPATIENT
Start: 2018-08-21 | End: 2018-08-21 | Stop reason: SURG

## 2018-08-21 RX ADMIN — PROPOFOL 200 MG: 10 INJECTION, EMULSION INTRAVENOUS at 11:45

## 2018-08-21 RX ADMIN — LIDOCAINE HYDROCHLORIDE 50 MG: 20 INJECTION, SOLUTION INFILTRATION; PERINEURAL at 11:15

## 2018-08-21 RX ADMIN — SODIUM CHLORIDE, POTASSIUM CHLORIDE, SODIUM LACTATE AND CALCIUM CHLORIDE 1000 ML: 600; 310; 30; 20 INJECTION, SOLUTION INTRAVENOUS at 10:47

## 2018-08-21 RX ADMIN — PROPOFOL 100 MG: 10 INJECTION, EMULSION INTRAVENOUS at 11:30

## 2018-08-21 RX ADMIN — PROPOFOL 200 MG: 10 INJECTION, EMULSION INTRAVENOUS at 11:50

## 2018-08-21 RX ADMIN — PROPOFOL 200 MG: 10 INJECTION, EMULSION INTRAVENOUS at 11:15

## 2018-08-21 RX ADMIN — PROPOFOL 200 MG: 10 INJECTION, EMULSION INTRAVENOUS at 11:40

## 2018-08-21 NOTE — DISCHARGE INSTRUCTIONS
WHAT ARE DIVERTICULOSIS AND DIVERTICULITIS?  Many people have small pouches in their colons that bulge outward through weak spots, like an inner tube that pokes through weak places in a tire.  Each pouch is called a diverticulum.  The condition of having diverticula is DIVERTICULOSIS.  The condition becomes more common as people age.  About half of all people over the age of 60 have diverticulosis    When pouches become infected or inflamed, the condition is called DIVERTICULITIS.  This happens in 10% to 25% of people with diverticulosis.  Diverticulosis and diverticulitis are also called DIVERTICULAR DISEASE.     WHAT ARE THE SYMPTOMS?  Diverticulosis - Most people do not have any discomfort or symptoms.  However, symptoms may include mild cramps, bloating, and constipation.  Other diseases such as irritable bowel syndrome (IBS) and stomach ulcers cause similar problems, so these symptoms do not always mean a person has diverticulosis.  You should visit your doctor if you have these troubling symptoms.    Diverticulitis - The most common symptom is abdominal pain.  The most common sign is tenderness around the left side of the lower abdomen.  If infection is the cause, fever, nausea, vomiting, chills, cramping, and constipation may occur as well.  The severity depends on the extent of the infection and complications.    WHAT ARE THE COMPLICATIONS?  Diverticulitis can lead to bleeding, infections,perforations or tears, or blockages.  These complications always require treatment to prevent them from proggressing and causing serous illness.    Bleeding from a diverticula is a rare complication.  When this occurs, blood may appear in the toilet or in your stool.  Bleeding can be severe, but it may stop by itself and not require treatment.  Doctors believe bleeding diverticula are caused by a small blood vessel in a diverticulum that weakens and finally bursts.  If you have bleeding from the rectum, you should see your  doctor.  If the bleeding does not stop you may need surgery.    Abscess, Perforation, and Peritonitis - The infection causing diverticulitis often clears up after a few days of treatment with antibiotics.  If the condition gets worse, an abscess may form in the colon.  An abscess is an infected area with pus that may cause swelling and destroy tissue.  Sometimes the infected diverticula may develop small holes, called perforations.  These perforations allow pus to leak out of the colon into the abdominal area.  If the abscess is small and remains in the colon, it may clear up after treatment with antibiotics.  If not, the doctor may need to drain it.  A large abscess can become a serious problem if the infection leaks out and contaminates areas outside the colon.  Infection that spreads into the abdominal cavity is called peritonitis.  Peritonitis requires immediate surgery toclean the abdominal cavity and remove the damaged part of the colon.  Without surgery, peritonitis can be fatal.    FISTULA  A fistula is an abnormal connection of tissue between two organs or between an organ and the skin.  When damaged tissues come into contact with each other during infection, they sometimes stick together.  If they heal that way, a fistula forms.  When diverticulitis-related infection spreads through out the colon, the colon's tissue may stick to nearby tissues.  The organs usually involved are the bladder, small intestine, and skin.  The problem can be corrected with surgery to remove the fistula and affected part of the colon.    INTESTINAL OBSTRUCTION  The scarring caused by infection may cause partial or total blockage of the large intestine.  When this happens, the colon is unable to move bowel contents normally.  When the obstruction totally blocks the intestine, emergency surgery is necessary.  Partial blockage is not an emergency, so the surgery to correct it can be planned.    WHAT CAUSES DIVERTICULAR  DISEASE  Although not proven, the dominant theory is that a low-fiber diet is the main cause of diverticular disease.  The disease was first noticed in the [place] in the early 1900s.  At about the same time, processed foods were introduced into the American diet.  Many processed foods contain refined, low-fiber flour.  Unlike whole-wheat flour, refined flour has no wheat bran.    Diverticular disease is common in developed or industrialized countries-particularly the [place], [place], and [place]-where low-fiber diets are common.  The disease is rare in countries of [place] and [place], where people eat high-fiber vegetable diets.    Fiber is the part of fruits, vegetables, and whole grains that the body cannot digest.  Some fiber dissolves easily in water (soluble fiber).  It takes on a soft, jelly-like texture in the intestines.  Some fiber passes almost unchanged through the intestines (insoluble fiber).  Both kinds of fiber help make stools soft and easy to pass.  Fiber also prevents constipation.    Constipation makes the muscles strain to move stool that is too hard.  It is the main cause of increased pressure in the colon.  This excess pressure might cause the weak spots in the colon to bulge out and become diverticula.  Diverticulitis occurs when diverticula become infected or inflamed.  Doctors are not certain what causes the infection.  It may begin when stool or bacteria are caught in the diverticula.  An attack of diverticulitis can develop suddenly and without warning.    HOW DOES THE DOCTOR DIAGNOSE DIVERTICULAR DISEASE  The doctor asks about medical history, does a physical exam, and may perform one or more diagnostic tests.  Because most people do not have symptoms, diverticulosis is often found through tests ordered for another ailment.    When taking a medical history, the doctor may ask about bowel habits, symptoms, pain, diet, and medications.  The physical exam usually involves a  digital rectal exam.  To preform this test. The doctor inserts a gloved, lubricated finger into the rectum to detect tenderness, blockage, or blood.  The doctor may check stool for signs of bleeding and test blood for signs of infection.  The doctor may also order x-rays or other tests.    WHAT IS THE TREATMENT FOR DIVERTICULAR DISEASE  Increasing the amount of fiber in the diet may reduce symptoms of diverticulosis and prevent complications such as diverticulitis.  Fiber keeps stool soft and lowers pressure inside the colon so that bowel contents can move through easily.  The American Dietetic Association. Recommends 20 to 35 grams of fiber each day.  The doctor may also recommend taking a fiber product such as Citrucel or Metamucil once a dya.  These products are mixed water and provide about 2 to 3.5 grams of fiber per  Tablespoon, mixed with 8 ounces of water.    Avoidance of nuts, popcorn, and sunflower, pumpkin, mine, and sesame seeds has been recommended by physicians out of fear that food particles could enter, block, or irritate the diverticula.  However, no scientific data support this treatment measure.  Eating a high-fiber diet is the only requirement highly emphasized across the medical literature.  Eliminating specific foods is not necessary.  The seeds in tomatoes, zucchini, cucumbers, strawberries, and raspberries, as well as poppy seeds, are generally considered harmless.  People differ in amounts and types of foods the can eat.  Decisions about diet should be made based on what works best for each person.  Keeping a food diary may help identify what foods may cause symptoms.    If cramps, bloating, and constipation are problems, the doctor may prescribe  Short course of pain medication.  However, many medications affect emptying of the colon, an undesirable side effect for people with diverticulosis.    DIVERTICULITIS  Treatment focuses on clearing up the infection and inflammation, resting the  colon, and preventing or minimizing complications.  An attack of diverticulitis without complications may respond to antibiotics within a few days if treated early.  To help the colon rest, the doctor may recommend bed rest and a liquid diet, along with a pain reliever.    An acute attack with severe pain or sever infection may require a hospital stay.  Most acute cases of diverticulitis are treated with antibiotics and a liquid diet.  The antibiotics are given by injection into a vein.  In some cases, however, surgery may be necessary.    WHEN IS SURGERY NECESSARY  If attacks are severe or frequent, the doctor may advise surgery.  The surgeon removes the affected part of the colon and joins the remaining sections.  This typed of surgery, called colon resection, aims to keep attacks from coming back and to prevent complications.  The doctor may also recommend surgery for complications of a fistula or intestinal obstruction.    If antibiotics do not correct an attack, emergency surgery may be required.  Other reasons for emergency surgery include a large abscess, perforation, peritonitis, or continued bleeding.    Emergency surgery usually involves 2 operations.  The first will clear the infected abdominal cavity and remove part of the colon.  Because infection and sometimes obstruction, it is not safe to rejoin the colon during the first operation.  Instead, the surgeon creates a temporary hole, or stoma, in the abdomen.  The end of the colon is connected to the hole, a procedure called a colostomy, to allow normal eating and bowel movements.  The stool goes into a bag attached to the opening in the abdomen.  In the second operation, the surgeon rejoins the ends of the colon.

## 2018-08-21 NOTE — ANESTHESIA POSTPROCEDURE EVALUATION
Patient: Iris Hyde    Procedure Summary     Date:  08/21/18 Room / Location:  [facility] ENDOSCOPY 10 / [facility] ENDOSCOPY    Anesthesia Start:  1114 Anesthesia Stop:  1205    Procedures:       COLONOSCOPY INTO CECUM WITH HOT SNARE POLYPECTOMIES (N/A )      ESOPHAGOGASTRODUODENOSCOPY with biopsies (N/A Esophagus) Diagnosis:       Gastroparesis      Adenomatous polyps      Gastroesophageal reflux disease with esophagitis      (Gastroparesis [K31.84])      (Adenomatous polyps [D36.9])      (Gastroesophageal reflux disease with esophagitis [K21.0])    Surgeon:  Citlaly Biggs MD Provider:  Emily Romero MD    Anesthesia Type:  MAC ASA Status:  3          Anesthesia Type: MAC  Last vitals  BP   116/79 (08/21/18 1227)   Temp   36.7 °C (98 °F) (08/21/18 1030)   Pulse   62 (08/21/18 1227)   Resp   16 (08/21/18 1227)     SpO2   100 % (08/21/18 1227)     Post Anesthesia Care and Evaluation    Patient location during evaluation: bedside  Patient participation: complete - patient participated  Level of consciousness: awake  Pain management: adequate  Airway patency: patent  Anesthetic complications: No anesthetic complications    Cardiovascular status: acceptable  Respiratory status: acceptable  Hydration status: acceptable    Comments: */79 (BP Location: Left arm, Patient Position: Lying)   Pulse 62   Temp 36.7 °C (98 °F) (Oral)   Resp 16   Wt 117 kg (258 lb)   SpO2 100%   BMI 40.41 kg/m²

## 2018-08-21 NOTE — H&P
[facility]  Pre Procedure History & Physical    Chief Complaint:   Gerd, history of polyps    Subjective     HPI: Iris Hyde is a 58 y.o. female who presents with gastroparesis, GERD, history of precancerous polyps.  She has had episodes of n/v - this causes diarrhea and sometimes associated with incontinence.  No blood in stool.  She uses phenergan prn if she can keep it down.  She takes nsaids regularly for arthritis.  Her heartburn is well controlled.  When she vomits it is old food.  She feels like she did better on dexilant.     She is eating a low residue diet - she eats small meals.  Often eats once a day. Dairy seems to bother her.        Past Medical History:   Past Medical History:   Diagnosis Date   • Alkaline phosphatase elevation    • Alopecia    • Anxiety    • Arthritis    • Asthmatic bronchitis    • Bladder spasms    • Chronic low back pain    • Colitis    • Depression    • Eczema    • Esophageal reflux    • Fibromyalgia    • Headache    • High risk medication use    • Migraine headache    • Neoplasm of uncertain behavior of breast    • Skin cancer    • Sleep apnea with use of continuous positive airway pressure (CPAP)    • Thyroid disorder    • Urinary frequency    • Urinary pain        Past Surgical History:  Past Surgical History:   Procedure Laterality Date   • APPENDECTOMY  1980    DR. MATIAS   • CHOLECYSTECTOMY  1989    DR. IGOR FUNES   • COLONOSCOPY  09/20/2013    Two 8-9mm polyps in the rectum and in the sigmoid colon, 5mm polyp in the transverse colon, two 2-3mm polyps in the sigmoid colon, NBIH.  PATH: Tubular adenom, hyperplastic changes.    • CRANIOTOMY  1996   • ENDOSCOPY  08/29/2013    LA Grade A reflux esophagitis, HH, erosive gastritis, A single small papule with no stigmata of recent bleeding was found.  PATH:Benign.    • TUBAL ABDOMINAL LIGATION  1982       Family History:  Family History   Problem Relation Age of Onset   • Heart disease Other    •  Diabetes Other    • Alcohol abuse Other    • Anxiety disorder Other    • Bipolar disorder Other    • Cancer Other    • Depression Other    • Lung disease Other    • Kidney disease Other    • Rheum arthritis Other    • Thyroid disease Other        Social History:   reports that she has been smoking.  She has a 43.00 pack-year smoking history. She has never used smokeless tobacco. She reports that she drinks alcohol. She reports that she uses drugs, including Marijuana.    Medications:   Prescriptions Prior to Admission   Medication Sig Dispense Refill Last Dose   • cyclobenzaprine (FLEXERIL) 10 MG tablet TAKE 1 TABLET BY MOUTH THREE TIMES DAILY 90 tablet 3 Past Week at Unknown time   • dexlansoprazole (DEXILANT) 60 MG capsule Take 1 capsule by mouth Daily for 30 days. 30 capsule 5 8/20/2018 at Unknown time   • levothyroxine (SYNTHROID, LEVOTHROID) 200 MCG tablet Take 1 tablet by mouth Daily. 90 tablet 3 8/20/2018 at Unknown time   • losartan-hydrochlorothiazide (HYZAAR) 50-12.5 MG per tablet Take 1 tablet by mouth Daily. 30 tablet 5 8/20/2018 at Unknown time   • pantoprazole (PROTONIX) 40 MG EC tablet Take 1 tablet by mouth Daily. 90 tablet 3 8/20/2018 at Unknown time   • promethazine (PHENERGAN) 25 MG tablet Take 1 tablet by mouth Every 4 (Four) Hours As Needed for Nausea. 100 tablet 2 Past Week at Unknown time   • venlafaxine XR (EFFEXOR-XR) 75 MG 24 hr capsule TAKE 1 CAPSULE BY MOUTH EVERY DAY WITH FOOD 30 capsule 0 8/20/2018 at Unknown time   • albuterol (PROVENTIL HFA;VENTOLIN HFA) 108 (90 Base) MCG/ACT inhaler Inhale 2 puffs Every 4 (Four) Hours As Needed for Wheezing. 1 inhaler 11 More than a month at Unknown time   • ibuprofen (ADVIL,MOTRIN) 200 MG tablet Take 200 mg by mouth Every 6 (Six) Hours As Needed for Mild Pain .   8/14/2018   • naproxen sodium (ALEVE) 220 MG tablet Take 220 mg by mouth Daily As Needed.   8/14/2018   • varenicline (CHANTIX STARTING MONTH PAK) 0.5 MG X 11 & 1 MG X 42 tablet Take 0.5 mg  one daily on days 1-3 and and 0.5 mg twice daily on days 4-7.Then 1 mg twice daily for a total of 12 weeks. 53 tablet 0 Unknown at Unknown time       Allergies:  Butorphanol; Hydrocodone-acetaminophen; and Cymbalta [duloxetine hcl]    ROS:    Pertinent items are noted in HPI, all other systems reviewed and negative     Objective     Blood pressure 146/80, pulse 73, temperature 98 °F (36.7 °C), temperature source Oral, resp. rate 22, weight 117 kg (258 lb), SpO2 96 %.    Physical Exam   Constitutional: Pt is oriented to person, place, and time and well-developed, well-nourished, and in no distress.   Mouth/Throat: Oropharynx is clear and moist.   Neck: Normal range of motion.   Cardiovascular: Normal rate, regular rhythm  Pulmonary/Chest: Effort normal   Abdominal: Soft. Nontender  Skin: Skin is warm and dry.   Psychiatric: Mood, memory, affect and judgment normal.     Assessment/Plan     Diagnosis:  Gerd, history of polyps    Anticipated Surgical Procedure:  egd/colonoscopy    The risks, benefits, and alternatives of this procedure have been discussed with the patient or the responsible party- the patient understands and agrees to proceed.

## 2018-08-21 NOTE — ANESTHESIA PREPROCEDURE EVALUATION
Anesthesia Evaluation     Patient summary reviewed and Nursing notes reviewed   NPO Solid Status: > 8 hours  NPO Liquid Status: > 8 hours           Airway   Mallampati: I  TM distance: >3 FB  Neck ROM: limited  Possible difficult intubation and Large neck circumference  Dental    (+) upper dentures    Pulmonary - normal exam   (+) a smoker Current, asthma, sleep apnea on CPAP,   Cardiovascular - normal exam    (+) hypertension less than 2 medications,       Neuro/Psych  (+) psychiatric history Anxiety and Depression,     GI/Hepatic/Renal/Endo    (+) morbid obesity, GERD,  hypothyroidism,     Musculoskeletal     (+) back pain, myalgias,   Abdominal    Substance History   (+) drug use (Marijuana for chronic pain)     OB/GYN          Other                      Anesthesia Plan    ASA 3     MAC     Anesthetic plan and risks discussed with patient.

## 2018-08-22 PROBLEM — Z86.010 HX OF ADENOMATOUS COLONIC POLYPS: Status: ACTIVE | Noted: 2018-08-22

## 2018-08-22 PROBLEM — Z86.0101 HX OF ADENOMATOUS COLONIC POLYPS: Status: ACTIVE | Noted: 2018-08-22

## 2018-08-22 LAB
CYTO UR: NORMAL
LAB AP CASE REPORT: NORMAL
PATH REPORT.FINAL DX SPEC: NORMAL
PATH REPORT.GROSS SPEC: NORMAL

## 2018-08-27 ENCOUNTER — TELEPHONE (OUTPATIENT)
Dept: GASTROENTEROLOGY | Facility: CLINIC | Age: 58
End: 2018-08-27

## 2018-08-27 NOTE — TELEPHONE ENCOUNTER
Focal inflammation seen on gastric biopsies-continue pantoprazole    The polyp(s) biopsies showed adenomatous change. This is not cancerous but is considered potentially precancerous. Follow-up colonoscopy in 3 years is advised.

## 2018-09-25 DIAGNOSIS — E03.9 ACQUIRED HYPOTHYROIDISM: ICD-10-CM

## 2018-09-26 RX ORDER — LEVOTHYROXINE SODIUM 0.2 MG/1
200 TABLET ORAL DAILY
Qty: 90 TABLET | Refills: 0 | Status: SHIPPED | OUTPATIENT
Start: 2018-09-26 | End: 2019-04-12 | Stop reason: SDUPTHER

## 2018-09-26 RX ORDER — VENLAFAXINE HYDROCHLORIDE 75 MG/1
CAPSULE, EXTENDED RELEASE ORAL
Qty: 30 CAPSULE | Refills: 0 | Status: SHIPPED | OUTPATIENT
Start: 2018-09-26 | End: 2019-08-08 | Stop reason: SDUPTHER

## 2018-09-26 RX ORDER — LOSARTAN POTASSIUM AND HYDROCHLOROTHIAZIDE 12.5; 5 MG/1; MG/1
1 TABLET ORAL DAILY
Qty: 30 TABLET | Refills: 0 | Status: SHIPPED | OUTPATIENT
Start: 2018-09-26 | End: 2019-03-21 | Stop reason: ALTCHOICE

## 2018-09-26 RX ORDER — VENLAFAXINE HYDROCHLORIDE 75 MG/1
CAPSULE, EXTENDED RELEASE ORAL
Qty: 90 CAPSULE | Refills: 0 | Status: SHIPPED | OUTPATIENT
Start: 2018-09-26 | End: 2018-10-03 | Stop reason: SDUPTHER

## 2018-10-03 ENCOUNTER — OFFICE VISIT (OUTPATIENT)
Dept: FAMILY MEDICINE CLINIC | Facility: CLINIC | Age: 58
End: 2018-10-03

## 2018-10-03 VITALS
HEART RATE: 71 BPM | HEIGHT: 67 IN | BODY MASS INDEX: 41.91 KG/M2 | OXYGEN SATURATION: 96 % | WEIGHT: 267 LBS | SYSTOLIC BLOOD PRESSURE: 130 MMHG | DIASTOLIC BLOOD PRESSURE: 88 MMHG | RESPIRATION RATE: 16 BRPM | TEMPERATURE: 99 F

## 2018-10-03 DIAGNOSIS — J45.901 ASTHMATIC BRONCHITIS WITH ACUTE EXACERBATION, UNSPECIFIED ASTHMA SEVERITY, UNSPECIFIED WHETHER PERSISTENT: ICD-10-CM

## 2018-10-03 DIAGNOSIS — M19.90 ARTHRITIS: ICD-10-CM

## 2018-10-03 DIAGNOSIS — M25.551 PAIN OF RIGHT HIP JOINT: Primary | ICD-10-CM

## 2018-10-03 PROCEDURE — 99213 OFFICE O/P EST LOW 20 MIN: CPT | Performed by: FAMILY MEDICINE

## 2018-10-03 RX ORDER — METHYLPREDNISOLONE 4 MG/1
TABLET ORAL
Qty: 21 TABLET | Refills: 0 | Status: SHIPPED | OUTPATIENT
Start: 2018-10-03 | End: 2018-12-06

## 2018-10-03 RX ORDER — DEXLANSOPRAZOLE 60 MG/1
60 CAPSULE, DELAYED RELEASE ORAL DAILY
Refills: 5 | COMMUNITY
Start: 2018-09-27 | End: 2019-04-26

## 2018-10-03 NOTE — PROGRESS NOTES
HPI  Iris Hyde is a 58 y.o. female who is here for follow up of pneumonia diagnosed at a [facility].  Reports had severe cough congestion and was seen at [facility] and diagnosed with pneumonia when she heard rales.  Was given Levaquin 750 mg daily as well as a Medrol Dosepak.  Continues with congested cough but improved.  Had severe right groin pain which resolved while on Medrol Dosepak but has recurred.  Several years ago patient had cortisone injection in the left hip for bursitis and symptoms completely resolved and did not return.  Has been seen by orthopedist for shoulder difficulties also and received cortisone injections.      Review of Systems   Constitutional: Negative for chills, diaphoresis and fever.   Respiratory: Positive for cough and choking.          Past Medical History:   Diagnosis Date   • Alkaline phosphatase elevation    • Alopecia    • Anxiety    • Arthritis    • Asthmatic bronchitis    • Bladder spasms    • Chronic low back pain    • Colitis    • Depression    • Eczema    • Esophageal reflux    • Fibromyalgia    • Headache    • High risk medication use    • Migraine headache    • Neoplasm of uncertain behavior of breast    • Skin cancer    • Sleep apnea with use of continuous positive airway pressure (CPAP)    • Thyroid disorder    • Urinary frequency    • Urinary pain        Past Surgical History:   Procedure Laterality Date   • APPENDECTOMY  1980    DR. MATIAS   • CHOLECYSTECTOMY  1989    DR. IGOR FUNES   • COLONOSCOPY  09/20/2013    Two 8-9mm polyps in the rectum and in the sigmoid colon, 5mm polyp in the transverse colon, two 2-3mm polyps in the sigmoid colon, NBIH.  PATH: Tubular adenom, hyperplastic changes.    • COLONOSCOPY N/A 8/21/2018    Procedure: COLONOSCOPY INTO CECUM WITH HOT SNARE POLYPECTOMIES;  Surgeon: Citlaly Biggs MD;  Location: [facility] ENDOSCOPY;  Service: Gastroenterology   • CRANIOTOMY  1996   • ENDOSCOPY  08/29/2013    LA Grade A reflux esophagitis,  HH, erosive gastritis, A single small papule with no stigmata of recent bleeding was found.  PATH:Benign.    • ENDOSCOPY N/A 8/21/2018    Procedure: ESOPHAGOGASTRODUODENOSCOPY with biopsies;  Surgeon: Citlaly Biggs MD;  Location: [facility] ENDOSCOPY;  Service: Gastroenterology   • TUBAL ABDOMINAL LIGATION  1982       Family History   Problem Relation Age of Onset   • Heart disease Other    • Diabetes Other    • Alcohol abuse Other    • Anxiety disorder Other    • Bipolar disorder Other    • Cancer Other    • Depression Other    • Lung disease Other    • Kidney disease Other    • Rheum arthritis Other    • Thyroid disease Other        Social History     Social History   • Marital status:      Spouse name: N/A   • Number of children: N/A   • Years of education: N/A     Occupational History   • Not on file.     Social History Main Topics   • Smoking status: Current Every Day Smoker     Packs/day: 1.00     Years: 43.00   • Smokeless tobacco: Never Used      Comment: Began smoking age 14.  Smoked 1 ppd for 43 pack year history.   • Alcohol use Yes      Comment: Rare   • Drug use: Yes     Types: Marijuana      Comment: Daily for pain management   • Sexual activity: No     Other Topics Concern   • Not on file     Social History Narrative   • No narrative on file         Physical Exam   Constitutional: She is oriented to person, place, and time. She appears well-developed and well-nourished. No distress.   HENT:   Nose: Nose normal.   Mouth/Throat: Oropharynx is clear and moist. No oropharyngeal exudate.   Eyes: Pupils are equal, round, and reactive to light. Conjunctivae are normal.   Neck: Normal range of motion.   Cardiovascular: Normal rate and regular rhythm.    Pulmonary/Chest: Effort normal. No respiratory distress. She has no wheezes. She has no rales.   Musculoskeletal: She exhibits no edema, tenderness or deformity.        Right hip: She exhibits decreased range of motion.        Legs:  Lymphadenopathy:      She has no cervical adenopathy.   Neurological: She is alert and oriented to person, place, and time.   Nursing note and vitals reviewed.        Assessment/Plan    Iris was seen today for groin pain and nicotine dependence.    Diagnoses and all orders for this visit:    Pain of right hip joint  -     Ambulatory Referral to Orthopedic Surgery  -     MethylPREDNISolone (MEDROL) 4 MG tablet; follow package directions    Arthritis    Asthmatic bronchitis with acute exacerbation, unspecified asthma severity, unspecified whether persistent        Patient here for follow-up of recently diagnosed pneumonia at a [facility] clinic.  Was given Levaquin as well as a Medrol Dosepak.  Continues with congested cough but lungs sound fairly clear on exam.  Has been having significant right groin pain and there is significant pain with movement of the right hip joint.  Symptoms totally resolved while on Medrol Dosepak which will be repeated.  Recommended contacting usual orthopedist for probable x-ray and possible cortisone injection.    This note includes information entered using a voice recognition dictation system.  Though reviewed, some nonsensible errors may remain.

## 2018-10-16 ENCOUNTER — OFFICE VISIT (OUTPATIENT)
Dept: ORTHOPEDIC SURGERY | Facility: CLINIC | Age: 58
End: 2018-10-16

## 2018-10-16 VITALS — WEIGHT: 260 LBS | BODY MASS INDEX: 40.81 KG/M2 | HEIGHT: 67 IN

## 2018-10-16 DIAGNOSIS — M25.551 BILATERAL HIP PAIN: Primary | ICD-10-CM

## 2018-10-16 DIAGNOSIS — M25.552 BILATERAL HIP PAIN: Primary | ICD-10-CM

## 2018-10-16 PROCEDURE — 99214 OFFICE O/P EST MOD 30 MIN: CPT | Performed by: NURSE PRACTITIONER

## 2018-10-16 PROCEDURE — 73522 X-RAY EXAM HIPS BI 3-4 VIEWS: CPT | Performed by: NURSE PRACTITIONER

## 2018-10-16 NOTE — PROGRESS NOTES
Patient: Iris Hyde  YOB: 1960 58 y.o. female  Medical Record Number: [id number]    Chief Complaints:   Chief Complaint   Patient presents with   • Left Hip - Pain, Establish Care   • Right Hip - Pain, Establish Care       History of Present Illness:Iris Hyde is a 58 y.o. female who presents with complaints of bilateral hip pain.  Apparently 3 weeks ago the patient had a very serious upper respiratory tract infection and was treated with oral steroids and antibiotic.  Immediately following the steroid she started with bilateral groin pain.  Initially the right hip is worse than the left but that has actually changed over the last week or 2.  The left is much more severe than the right.  She actually followed up with her primary care physician regarding the hip pain and he put her on another steroid pack.  That did not help with the pain and infection thinks it may worsen things.  She denies any injury.  She describes the left hip pain as a moderate to severe constant ache only better with anti-inflammatories and ice.    Allergies:   Allergies   Allergen Reactions   • Butorphanol Mental Status Change   • Hydrocodone-Acetaminophen Itching     norco    • Cymbalta [Duloxetine Hcl] Anxiety       Medications:   Current Outpatient Prescriptions   Medication Sig Dispense Refill   • albuterol (PROVENTIL HFA;VENTOLIN HFA) 108 (90 Base) MCG/ACT inhaler Inhale 2 puffs Every 4 (Four) Hours As Needed for Wheezing. 1 inhaler 11   • CHANTIX STARTING MONTH FLO 0.5 MG X 11 & 1 MG X 42 tablet TAKE AS DIRECTED 53 tablet 0   • cyclobenzaprine (FLEXERIL) 10 MG tablet TAKE 1 TABLET BY MOUTH THREE TIMES DAILY 90 tablet 3   • DEXILANT 60 MG capsule Take 60 mg by mouth Daily.  5   • ibuprofen (ADVIL,MOTRIN) 200 MG tablet Take 200 mg by mouth Every 6 (Six) Hours As Needed for Mild Pain .     • levothyroxine (SYNTHROID, LEVOTHROID) 200 MCG tablet TAKE 1 TABLET BY MOUTH DAILY 90 tablet 0   • losartan-hydrochlorothiazide  "(HYZAAR) 50-12.5 MG per tablet TAKE 1 TABLET BY MOUTH DAILY 30 tablet 0   • MethylPREDNISolone (MEDROL) 4 MG tablet follow package directions 21 tablet 0   • naproxen sodium (ALEVE) 220 MG tablet Take 220 mg by mouth Daily As Needed.     • promethazine (PHENERGAN) 25 MG tablet Take 1 tablet by mouth Every 4 (Four) Hours As Needed for Nausea. 100 tablet 2   • venlafaxine XR (EFFEXOR-XR) 75 MG 24 hr capsule TAKE 1 CAPSULE BY MOUTH EVERY DAY WITH FOOD 30 capsule 0     No current facility-administered medications for this visit.          The following portions of the patient's history were reviewed and updated as appropriate: allergies, current medications, past family history, past medical history, past social history, past surgical history and problem list.    Review of Systems:   A 14 point review of systems was performed. All systems negative except pertinent positives/negative listed in HPI above    Physical Exam:   Vitals:    10/16/18 1618   Weight: 118 kg (260 lb)   Height: 170.2 cm (67\")       General: A and O x 3, ASA, NAD    SCLERA:    Normal    DENTITION:   Normal  Skin clear no unusual lesions noted  Bilateral hips patient is nontender palpation she has pain with internal/external rotation with a positive Stinchfield negative logroll calf is soft and nontender    Radiology:  Xrays to views of the right hip were ordered and reviewed today secondary to pain and the patient does have significant arthritic changes noted of bilateral hips left greater than right.  No comparative views available    Assessment/Plan:  Bilateral hip pain    Clearly the patient does have arthritic changes but I'm concerned that she started with such severe pain after taking steroid packs.  We will proceed with an MRI of the left hip to rule out AVN that I cannot appreciate on x-ray.  This will help determine the nest course of action.  Clearly if she's got AVN and it is acute we might need to discuss total hip replacement.  If it is " osteoarthritis we might consider a fluoroscopy guided cortisone injection since she apparently had this over 20 years ago and did well with that.  Patient will call after the MRI report regarding results and treatment options

## 2018-10-25 ENCOUNTER — HOSPITAL ENCOUNTER (OUTPATIENT)
Dept: MRI IMAGING | Facility: HOSPITAL | Age: 58
Discharge: HOME | End: 2018-10-25
Admitting: NURSE PRACTITIONER

## 2018-10-25 DIAGNOSIS — M25.552 BILATERAL HIP PAIN: ICD-10-CM

## 2018-10-25 DIAGNOSIS — M25.551 BILATERAL HIP PAIN: ICD-10-CM

## 2018-10-25 PROCEDURE — 73721 MRI JNT OF LWR EXTRE W/O DYE: CPT

## 2018-11-11 DIAGNOSIS — J45.909 ASTHMATIC BRONCHITIS WITHOUT COMPLICATION, UNSPECIFIED ASTHMA SEVERITY, UNSPECIFIED WHETHER PERSISTENT: ICD-10-CM

## 2018-11-11 RX ORDER — ALBUTEROL SULFATE 90 UG/1
AEROSOL, METERED RESPIRATORY (INHALATION)
Qty: 18 G | Refills: 0 | Status: SHIPPED | OUTPATIENT
Start: 2018-11-11 | End: 2020-03-16 | Stop reason: SDUPTHER

## 2018-12-05 ENCOUNTER — APPOINTMENT (OUTPATIENT)
Dept: CT IMAGING | Facility: HOSPITAL | Age: 58
End: 2018-12-05

## 2018-12-05 ENCOUNTER — HOSPITAL ENCOUNTER (INPATIENT)
Facility: HOSPITAL | Age: 58
LOS: 3 days | Discharge: HOME | End: 2018-12-09
Attending: EMERGENCY MEDICINE | Admitting: UROLOGY

## 2018-12-05 DIAGNOSIS — A41.9 SEPSIS, DUE TO UNSPECIFIED ORGANISM: ICD-10-CM

## 2018-12-05 DIAGNOSIS — N20.1 URETEROLITHIASIS: Primary | ICD-10-CM

## 2018-12-05 DIAGNOSIS — N39.0 URINARY TRACT INFECTION, ACUTE: ICD-10-CM

## 2018-12-05 DIAGNOSIS — N20.1 RIGHT URETERAL STONE: ICD-10-CM

## 2018-12-05 LAB
ALBUMIN SERPL-MCNC: 4.3 G/DL (ref 3.5–5.2)
ALBUMIN/GLOB SERPL: 1.4 G/DL
ALP SERPL-CCNC: 150 U/L (ref 39–117)
ALT SERPL W P-5'-P-CCNC: 18 U/L (ref 1–33)
ANION GAP SERPL CALCULATED.3IONS-SCNC: 11.6 MMOL/L
AST SERPL-CCNC: 23 U/L (ref 1–32)
BACTERIA UR QL AUTO: ABNORMAL /HPF
BASOPHILS # BLD AUTO: 0.03 10*3/MM3 (ref 0–0.2)
BASOPHILS NFR BLD AUTO: 0.2 % (ref 0–1.5)
BILIRUB SERPL-MCNC: 0.5 MG/DL (ref 0.1–1.2)
BILIRUB UR QL STRIP: NEGATIVE
BUN BLD-MCNC: 19 MG/DL (ref 6–20)
BUN/CREAT SERPL: 16.4 (ref 7–25)
CALCIUM SPEC-SCNC: 9.2 MG/DL (ref 8.6–10.5)
CHLORIDE SERPL-SCNC: 101 MMOL/L (ref 98–107)
CLARITY UR: CLEAR
CO2 SERPL-SCNC: 26.4 MMOL/L (ref 22–29)
COLOR UR: YELLOW
CREAT BLD-MCNC: 1.16 MG/DL (ref 0.57–1)
D-LACTATE SERPL-SCNC: 2.2 MMOL/L (ref 0.5–2)
DEPRECATED RDW RBC AUTO: 45.2 FL (ref 37–54)
EOSINOPHIL # BLD AUTO: 0.12 10*3/MM3 (ref 0–0.7)
EOSINOPHIL NFR BLD AUTO: 0.7 % (ref 0.3–6.2)
ERYTHROCYTE [DISTWIDTH] IN BLOOD BY AUTOMATED COUNT: 13.1 % (ref 11.7–13)
GFR SERPL CREATININE-BSD FRML MDRD: 48 ML/MIN/1.73
GLOBULIN UR ELPH-MCNC: 3.1 GM/DL
GLUCOSE BLD-MCNC: 101 MG/DL (ref 65–99)
GLUCOSE UR STRIP-MCNC: NEGATIVE MG/DL
HCT VFR BLD AUTO: 41.5 % (ref 35.6–45.5)
HGB BLD-MCNC: 14.4 G/DL (ref 11.9–15.5)
HGB UR QL STRIP.AUTO: ABNORMAL
HYALINE CASTS UR QL AUTO: ABNORMAL /LPF
IMM GRANULOCYTES # BLD: 0.07 10*3/MM3 (ref 0–0.03)
IMM GRANULOCYTES NFR BLD: 0.4 % (ref 0–0.5)
KETONES UR QL STRIP: NEGATIVE
LEUKOCYTE ESTERASE UR QL STRIP.AUTO: ABNORMAL
LIPASE SERPL-CCNC: 43 U/L (ref 13–60)
LYMPHOCYTES # BLD AUTO: 2.78 10*3/MM3 (ref 0.9–4.8)
LYMPHOCYTES NFR BLD AUTO: 16.2 % (ref 19.6–45.3)
MCH RBC QN AUTO: 32.4 PG (ref 26.9–32)
MCHC RBC AUTO-ENTMCNC: 34.7 G/DL (ref 32.4–36.3)
MCV RBC AUTO: 93.5 FL (ref 80.5–98.2)
MONOCYTES # BLD AUTO: 0.77 10*3/MM3 (ref 0.2–1.2)
MONOCYTES NFR BLD AUTO: 4.5 % (ref 5–12)
NEUTROPHILS # BLD AUTO: 13.51 10*3/MM3 (ref 1.9–8.1)
NEUTROPHILS NFR BLD AUTO: 78.4 % (ref 42.7–76)
NITRITE UR QL STRIP: POSITIVE
PH UR STRIP.AUTO: 6 [PH] (ref 5–8)
PLATELET # BLD AUTO: 224 10*3/MM3 (ref 140–500)
PMV BLD AUTO: 10 FL (ref 6–12)
POTASSIUM BLD-SCNC: 4 MMOL/L (ref 3.5–5.2)
PROCALCITONIN SERPL-MCNC: 0.1 NG/ML (ref 0.1–0.25)
PROT SERPL-MCNC: 7.4 G/DL (ref 6–8.5)
PROT UR QL STRIP: NEGATIVE
RBC # BLD AUTO: 4.44 10*6/MM3 (ref 3.9–5.2)
RBC # UR: ABNORMAL /HPF
REF LAB TEST METHOD: ABNORMAL
SODIUM BLD-SCNC: 139 MMOL/L (ref 136–145)
SP GR UR STRIP: 1.02 (ref 1–1.03)
SQUAMOUS #/AREA URNS HPF: ABNORMAL /HPF
UROBILINOGEN UR QL STRIP: ABNORMAL
WBC NRBC COR # BLD: 17.21 10*3/MM3 (ref 4.5–10.7)
WBC UR QL AUTO: ABNORMAL /HPF

## 2018-12-05 PROCEDURE — 83690 ASSAY OF LIPASE: CPT | Performed by: PHYSICIAN ASSISTANT

## 2018-12-05 PROCEDURE — 80053 COMPREHEN METABOLIC PANEL: CPT | Performed by: PHYSICIAN ASSISTANT

## 2018-12-05 PROCEDURE — 25010000002 KETOROLAC TROMETHAMINE PER 15 MG: Performed by: PHYSICIAN ASSISTANT

## 2018-12-05 PROCEDURE — 85025 COMPLETE CBC W/AUTO DIFF WBC: CPT | Performed by: PHYSICIAN ASSISTANT

## 2018-12-05 PROCEDURE — 99285 EMERGENCY DEPT VISIT HI MDM: CPT

## 2018-12-05 PROCEDURE — 87086 URINE CULTURE/COLONY COUNT: CPT | Performed by: PHYSICIAN ASSISTANT

## 2018-12-05 PROCEDURE — 87150 DNA/RNA AMPLIFIED PROBE: CPT | Performed by: PHYSICIAN ASSISTANT

## 2018-12-05 PROCEDURE — 83605 ASSAY OF LACTIC ACID: CPT | Performed by: PHYSICIAN ASSISTANT

## 2018-12-05 PROCEDURE — 25010000002 IOPAMIDOL 61 % SOLUTION: Performed by: EMERGENCY MEDICINE

## 2018-12-05 PROCEDURE — 87040 BLOOD CULTURE FOR BACTERIA: CPT | Performed by: PHYSICIAN ASSISTANT

## 2018-12-05 PROCEDURE — 74177 CT ABD & PELVIS W/CONTRAST: CPT

## 2018-12-05 PROCEDURE — 36415 COLL VENOUS BLD VENIPUNCTURE: CPT

## 2018-12-05 PROCEDURE — 84145 PROCALCITONIN (PCT): CPT | Performed by: PHYSICIAN ASSISTANT

## 2018-12-05 PROCEDURE — 25010000002 ONDANSETRON PER 1 MG: Performed by: PHYSICIAN ASSISTANT

## 2018-12-05 PROCEDURE — 87088 URINE BACTERIA CULTURE: CPT | Performed by: PHYSICIAN ASSISTANT

## 2018-12-05 PROCEDURE — 81001 URINALYSIS AUTO W/SCOPE: CPT | Performed by: PHYSICIAN ASSISTANT

## 2018-12-05 PROCEDURE — 87186 SC STD MICRODIL/AGAR DIL: CPT | Performed by: PHYSICIAN ASSISTANT

## 2018-12-05 RX ORDER — CEFTRIAXONE SODIUM 1 G/50ML
1 INJECTION, SOLUTION INTRAVENOUS ONCE
Status: COMPLETED | OUTPATIENT
Start: 2018-12-05 | End: 2018-12-06

## 2018-12-05 RX ORDER — SODIUM CHLORIDE 0.9 % (FLUSH) 0.9 %
10 SYRINGE (ML) INJECTION AS NEEDED
Status: DISCONTINUED | OUTPATIENT
Start: 2018-12-05 | End: 2018-12-09 | Stop reason: HOSPADM

## 2018-12-05 RX ORDER — KETOROLAC TROMETHAMINE 15 MG/ML
15 INJECTION, SOLUTION INTRAMUSCULAR; INTRAVENOUS ONCE
Status: COMPLETED | OUTPATIENT
Start: 2018-12-05 | End: 2018-12-05

## 2018-12-05 RX ORDER — ONDANSETRON 2 MG/ML
4 INJECTION INTRAMUSCULAR; INTRAVENOUS ONCE
Status: COMPLETED | OUTPATIENT
Start: 2018-12-05 | End: 2018-12-05

## 2018-12-05 RX ORDER — ACETAMINOPHEN 500 MG
1000 TABLET ORAL ONCE
Status: COMPLETED | OUTPATIENT
Start: 2018-12-05 | End: 2018-12-05

## 2018-12-05 RX ADMIN — ACETAMINOPHEN 1000 MG: 500 TABLET, FILM COATED ORAL at 23:17

## 2018-12-05 RX ADMIN — IOPAMIDOL 85 ML: 612 INJECTION, SOLUTION INTRAVENOUS at 23:35

## 2018-12-05 RX ADMIN — KETOROLAC TROMETHAMINE 15 MG: 15 INJECTION, SOLUTION INTRAMUSCULAR; INTRAVENOUS at 21:31

## 2018-12-05 RX ADMIN — SODIUM CHLORIDE 1000 ML: 9 INJECTION, SOLUTION INTRAVENOUS at 21:31

## 2018-12-05 RX ADMIN — ONDANSETRON 4 MG: 2 INJECTION INTRAMUSCULAR; INTRAVENOUS at 21:30

## 2018-12-06 ENCOUNTER — ANESTHESIA EVENT (OUTPATIENT)
Dept: PERIOP | Facility: HOSPITAL | Age: 58
End: 2018-12-06

## 2018-12-06 ENCOUNTER — ANESTHESIA (OUTPATIENT)
Dept: PERIOP | Facility: HOSPITAL | Age: 58
End: 2018-12-06

## 2018-12-06 ENCOUNTER — APPOINTMENT (OUTPATIENT)
Dept: GENERAL RADIOLOGY | Facility: HOSPITAL | Age: 58
End: 2018-12-06

## 2018-12-06 PROBLEM — A41.9 SEPSIS, UNSPECIFIED ORGANISM: Status: ACTIVE | Noted: 2018-12-06

## 2018-12-06 PROBLEM — N20.1 URETEROLITHIASIS: Status: ACTIVE | Noted: 2018-12-06

## 2018-12-06 PROBLEM — N13.2 HYDRONEPHROSIS WITH URINARY OBSTRUCTION DUE TO URETERAL CALCULUS: Status: ACTIVE | Noted: 2018-12-06

## 2018-12-06 LAB
ANION GAP SERPL CALCULATED.3IONS-SCNC: 15.1 MMOL/L
BACTERIA BLD CULT: ABNORMAL
BASOPHILS # BLD AUTO: 0.01 10*3/MM3 (ref 0–0.2)
BASOPHILS NFR BLD AUTO: 0 % (ref 0–1.5)
BUN BLD-MCNC: 18 MG/DL (ref 6–20)
BUN/CREAT SERPL: 15.4 (ref 7–25)
CALCIUM SPEC-SCNC: 8.1 MG/DL (ref 8.6–10.5)
CHLORIDE SERPL-SCNC: 109 MMOL/L (ref 98–107)
CO2 SERPL-SCNC: 18.9 MMOL/L (ref 22–29)
CREAT BLD-MCNC: 1.17 MG/DL (ref 0.57–1)
D-LACTATE SERPL-SCNC: 1.7 MMOL/L (ref 0.5–2)
DEPRECATED RDW RBC AUTO: 47.8 FL (ref 37–54)
DEPRECATED RDW RBC AUTO: 49.1 FL (ref 37–54)
EOSINOPHIL # BLD AUTO: 0 10*3/MM3 (ref 0–0.7)
EOSINOPHIL NFR BLD AUTO: 0 % (ref 0.3–6.2)
ERYTHROCYTE [DISTWIDTH] IN BLOOD BY AUTOMATED COUNT: 13.2 % (ref 11.7–13)
ERYTHROCYTE [DISTWIDTH] IN BLOOD BY AUTOMATED COUNT: 13.5 % (ref 11.7–13)
GFR SERPL CREATININE-BSD FRML MDRD: 48 ML/MIN/1.73
GLUCOSE BLD-MCNC: 115 MG/DL (ref 65–99)
HCT VFR BLD AUTO: 38.8 % (ref 35.6–45.5)
HCT VFR BLD AUTO: 38.9 % (ref 35.6–45.5)
HGB BLD-MCNC: 12.3 G/DL (ref 11.9–15.5)
HGB BLD-MCNC: 12.4 G/DL (ref 11.9–15.5)
HOLD SPECIMEN: NORMAL
IMM GRANULOCYTES # BLD: 0.91 10*3/MM3 (ref 0–0.03)
IMM GRANULOCYTES NFR BLD: 3.1 % (ref 0–0.5)
LYMPHOCYTES # BLD AUTO: 1.6 10*3/MM3 (ref 0.9–4.8)
LYMPHOCYTES NFR BLD AUTO: 5.4 % (ref 19.6–45.3)
MCH RBC QN AUTO: 31.3 PG (ref 26.9–32)
MCH RBC QN AUTO: 31.3 PG (ref 26.9–32)
MCHC RBC AUTO-ENTMCNC: 31.7 G/DL (ref 32.4–36.3)
MCHC RBC AUTO-ENTMCNC: 31.9 G/DL (ref 32.4–36.3)
MCV RBC AUTO: 98.2 FL (ref 80.5–98.2)
MCV RBC AUTO: 98.7 FL (ref 80.5–98.2)
MONOCYTES # BLD AUTO: 1.05 10*3/MM3 (ref 0.2–1.2)
MONOCYTES NFR BLD AUTO: 3.6 % (ref 5–12)
NEUTROPHILS # BLD AUTO: 25.98 10*3/MM3 (ref 1.9–8.1)
NEUTROPHILS NFR BLD AUTO: 87.9 % (ref 42.7–76)
PLATELET # BLD AUTO: 145 10*3/MM3 (ref 140–500)
PLATELET # BLD AUTO: 160 10*3/MM3 (ref 140–500)
PMV BLD AUTO: 9.4 FL (ref 6–12)
PMV BLD AUTO: 9.8 FL (ref 6–12)
POTASSIUM BLD-SCNC: 4.1 MMOL/L (ref 3.5–5.2)
RBC # BLD AUTO: 3.93 10*6/MM3 (ref 3.9–5.2)
RBC # BLD AUTO: 3.96 10*6/MM3 (ref 3.9–5.2)
SODIUM BLD-SCNC: 143 MMOL/L (ref 136–145)
WBC NRBC COR # BLD: 14.64 10*3/MM3 (ref 4.5–10.7)
WBC NRBC COR # BLD: 29.55 10*3/MM3 (ref 4.5–10.7)

## 2018-12-06 PROCEDURE — 0 IOTHALAMATE 60 % SOLUTION: Performed by: UROLOGY

## 2018-12-06 PROCEDURE — 25010000003 CEFTRIAXONE PER 250 MG: Performed by: UROLOGY

## 2018-12-06 PROCEDURE — 25010000002 FENTANYL CITRATE (PF) 100 MCG/2ML SOLUTION: Performed by: NURSE ANESTHETIST, CERTIFIED REGISTERED

## 2018-12-06 PROCEDURE — 87040 BLOOD CULTURE FOR BACTERIA: CPT | Performed by: PHYSICIAN ASSISTANT

## 2018-12-06 PROCEDURE — 25010000002 PROPOFOL 10 MG/ML EMULSION: Performed by: NURSE ANESTHETIST, CERTIFIED REGISTERED

## 2018-12-06 PROCEDURE — 25010000002 SUCCINYLCHOLINE PER 20 MG: Performed by: NURSE ANESTHETIST, CERTIFIED REGISTERED

## 2018-12-06 PROCEDURE — 83605 ASSAY OF LACTIC ACID: CPT | Performed by: PHYSICIAN ASSISTANT

## 2018-12-06 PROCEDURE — 25010000002 DEXAMETHASONE PER 1 MG: Performed by: NURSE ANESTHETIST, CERTIFIED REGISTERED

## 2018-12-06 PROCEDURE — 25010000002 ONDANSETRON PER 1 MG: Performed by: PHYSICIAN ASSISTANT

## 2018-12-06 PROCEDURE — 25010000002 CEFTRIAXONE PER 250 MG: Performed by: PHYSICIAN ASSISTANT

## 2018-12-06 PROCEDURE — 74420 UROGRAPHY RTRGR +-KUB: CPT

## 2018-12-06 PROCEDURE — 25010000002 HYDROMORPHONE PER 4 MG: Performed by: HOSPITALIST

## 2018-12-06 PROCEDURE — BT1D0ZZ FLUOROSCOPY OF RIGHT KIDNEY, URETER AND BLADDER USING HIGH OSMOLAR CONTRAST: ICD-10-PCS | Performed by: UROLOGY

## 2018-12-06 PROCEDURE — C1758 CATHETER, URETERAL: HCPCS | Performed by: UROLOGY

## 2018-12-06 PROCEDURE — 25010000002 ONDANSETRON PER 1 MG: Performed by: NURSE ANESTHETIST, CERTIFIED REGISTERED

## 2018-12-06 PROCEDURE — 36415 COLL VENOUS BLD VENIPUNCTURE: CPT | Performed by: NURSE PRACTITIONER

## 2018-12-06 PROCEDURE — 25010000002 FENTANYL CITRATE (PF) 100 MCG/2ML SOLUTION: Performed by: ANESTHESIOLOGY

## 2018-12-06 PROCEDURE — 25010000002 HYDROMORPHONE PER 4 MG: Performed by: EMERGENCY MEDICINE

## 2018-12-06 PROCEDURE — 0T768DZ DILATION OF RIGHT URETER WITH INTRALUMINAL DEVICE, VIA NATURAL OR ARTIFICIAL OPENING ENDOSCOPIC: ICD-10-PCS | Performed by: UROLOGY

## 2018-12-06 PROCEDURE — 87086 URINE CULTURE/COLONY COUNT: CPT | Performed by: UROLOGY

## 2018-12-06 PROCEDURE — 85027 COMPLETE CBC AUTOMATED: CPT | Performed by: NURSE PRACTITIONER

## 2018-12-06 PROCEDURE — 80048 BASIC METABOLIC PNL TOTAL CA: CPT | Performed by: UROLOGY

## 2018-12-06 PROCEDURE — C1769 GUIDE WIRE: HCPCS | Performed by: UROLOGY

## 2018-12-06 PROCEDURE — 85025 COMPLETE CBC W/AUTO DIFF WBC: CPT | Performed by: UROLOGY

## 2018-12-06 PROCEDURE — 25010000002 PHENYLEPHRINE PER 1 ML: Performed by: NURSE ANESTHETIST, CERTIFIED REGISTERED

## 2018-12-06 PROCEDURE — C2617 STENT, NON-COR, TEM W/O DEL: HCPCS | Performed by: UROLOGY

## 2018-12-06 PROCEDURE — 25010000002 MIDAZOLAM PER 1 MG: Performed by: ANESTHESIOLOGY

## 2018-12-06 DEVICE — URETERAL STENT
Type: IMPLANTABLE DEVICE | Status: FUNCTIONAL
Brand: PERCUFLEX™ PLUS

## 2018-12-06 RX ORDER — ONDANSETRON 2 MG/ML
INJECTION INTRAMUSCULAR; INTRAVENOUS AS NEEDED
Status: DISCONTINUED | OUTPATIENT
Start: 2018-12-06 | End: 2018-12-06 | Stop reason: SURG

## 2018-12-06 RX ORDER — FAMOTIDINE 10 MG/ML
20 INJECTION, SOLUTION INTRAVENOUS ONCE
Status: COMPLETED | OUTPATIENT
Start: 2018-12-06 | End: 2018-12-06

## 2018-12-06 RX ORDER — SODIUM CHLORIDE 9 MG/ML
100 INJECTION, SOLUTION INTRAVENOUS CONTINUOUS
Status: DISCONTINUED | OUTPATIENT
Start: 2018-12-06 | End: 2018-12-08

## 2018-12-06 RX ORDER — PANTOPRAZOLE SODIUM 40 MG/1
40 TABLET, DELAYED RELEASE ORAL
Status: DISCONTINUED | OUTPATIENT
Start: 2018-12-06 | End: 2018-12-09 | Stop reason: HOSPADM

## 2018-12-06 RX ORDER — HYDROMORPHONE HYDROCHLORIDE 1 MG/ML
0.5 INJECTION, SOLUTION INTRAMUSCULAR; INTRAVENOUS; SUBCUTANEOUS ONCE
Status: COMPLETED | OUTPATIENT
Start: 2018-12-06 | End: 2018-12-06

## 2018-12-06 RX ORDER — SODIUM CHLORIDE 9 MG/ML
100 INJECTION, SOLUTION INTRAVENOUS CONTINUOUS
Status: DISCONTINUED | OUTPATIENT
Start: 2018-12-06 | End: 2018-12-07

## 2018-12-06 RX ORDER — SODIUM CHLORIDE 0.9 % (FLUSH) 0.9 %
3 SYRINGE (ML) INJECTION EVERY 12 HOURS SCHEDULED
Status: DISCONTINUED | OUTPATIENT
Start: 2018-12-06 | End: 2018-12-09 | Stop reason: HOSPADM

## 2018-12-06 RX ORDER — DEXAMETHASONE SODIUM PHOSPHATE 10 MG/ML
INJECTION INTRAMUSCULAR; INTRAVENOUS AS NEEDED
Status: DISCONTINUED | OUTPATIENT
Start: 2018-12-06 | End: 2018-12-06 | Stop reason: SURG

## 2018-12-06 RX ORDER — MIDAZOLAM HYDROCHLORIDE 1 MG/ML
1 INJECTION INTRAMUSCULAR; INTRAVENOUS
Status: DISCONTINUED | OUTPATIENT
Start: 2018-12-06 | End: 2018-12-06 | Stop reason: HOSPADM

## 2018-12-06 RX ORDER — LIDOCAINE HYDROCHLORIDE 20 MG/ML
INJECTION, SOLUTION INFILTRATION; PERINEURAL AS NEEDED
Status: DISCONTINUED | OUTPATIENT
Start: 2018-12-06 | End: 2018-12-06 | Stop reason: SURG

## 2018-12-06 RX ORDER — FENTANYL CITRATE 50 UG/ML
INJECTION, SOLUTION INTRAMUSCULAR; INTRAVENOUS AS NEEDED
Status: DISCONTINUED | OUTPATIENT
Start: 2018-12-06 | End: 2018-12-06 | Stop reason: SURG

## 2018-12-06 RX ORDER — MIDAZOLAM HYDROCHLORIDE 1 MG/ML
2 INJECTION INTRAMUSCULAR; INTRAVENOUS
Status: DISCONTINUED | OUTPATIENT
Start: 2018-12-06 | End: 2018-12-06 | Stop reason: HOSPADM

## 2018-12-06 RX ORDER — SUCCINYLCHOLINE CHLORIDE 20 MG/ML
INJECTION INTRAMUSCULAR; INTRAVENOUS AS NEEDED
Status: DISCONTINUED | OUTPATIENT
Start: 2018-12-06 | End: 2018-12-06 | Stop reason: SURG

## 2018-12-06 RX ORDER — NALOXONE HCL 0.4 MG/ML
0.2 VIAL (ML) INJECTION AS NEEDED
Status: DISCONTINUED | OUTPATIENT
Start: 2018-12-06 | End: 2018-12-06 | Stop reason: HOSPADM

## 2018-12-06 RX ORDER — ACETAMINOPHEN 325 MG/1
650 TABLET ORAL ONCE AS NEEDED
Status: DISCONTINUED | OUTPATIENT
Start: 2018-12-06 | End: 2018-12-06 | Stop reason: HOSPADM

## 2018-12-06 RX ORDER — HYDROMORPHONE HYDROCHLORIDE 1 MG/ML
0.5 INJECTION, SOLUTION INTRAMUSCULAR; INTRAVENOUS; SUBCUTANEOUS
Status: DISCONTINUED | OUTPATIENT
Start: 2018-12-06 | End: 2018-12-06 | Stop reason: HOSPADM

## 2018-12-06 RX ORDER — CEFTRIAXONE SODIUM 1 G/50ML
1 INJECTION, SOLUTION INTRAVENOUS EVERY 24 HOURS
Status: DISCONTINUED | OUTPATIENT
Start: 2018-12-06 | End: 2018-12-06 | Stop reason: DRUGHIGH

## 2018-12-06 RX ORDER — NICOTINE 21 MG/24HR
1 PATCH, TRANSDERMAL 24 HOURS TRANSDERMAL
Status: DISCONTINUED | OUTPATIENT
Start: 2018-12-06 | End: 2018-12-09 | Stop reason: HOSPADM

## 2018-12-06 RX ORDER — IBUPROFEN 800 MG/1
800 TABLET ORAL ONCE
Status: COMPLETED | OUTPATIENT
Start: 2018-12-06 | End: 2018-12-06

## 2018-12-06 RX ORDER — LABETALOL HYDROCHLORIDE 5 MG/ML
5 INJECTION, SOLUTION INTRAVENOUS
Status: DISCONTINUED | OUTPATIENT
Start: 2018-12-06 | End: 2018-12-06 | Stop reason: HOSPADM

## 2018-12-06 RX ORDER — ONDANSETRON 2 MG/ML
4 INJECTION INTRAMUSCULAR; INTRAVENOUS ONCE
Status: COMPLETED | OUTPATIENT
Start: 2018-12-06 | End: 2018-12-06

## 2018-12-06 RX ORDER — HYDROMORPHONE HYDROCHLORIDE 1 MG/ML
0.5 INJECTION, SOLUTION INTRAMUSCULAR; INTRAVENOUS; SUBCUTANEOUS
Status: DISCONTINUED | OUTPATIENT
Start: 2018-12-06 | End: 2018-12-09 | Stop reason: HOSPADM

## 2018-12-06 RX ORDER — ONDANSETRON 2 MG/ML
4 INJECTION INTRAMUSCULAR; INTRAVENOUS ONCE AS NEEDED
Status: DISCONTINUED | OUTPATIENT
Start: 2018-12-06 | End: 2018-12-06 | Stop reason: HOSPADM

## 2018-12-06 RX ORDER — LEVOTHYROXINE SODIUM 0.1 MG/1
200 TABLET ORAL
Status: DISCONTINUED | OUTPATIENT
Start: 2018-12-06 | End: 2018-12-09 | Stop reason: HOSPADM

## 2018-12-06 RX ORDER — ALBUTEROL SULFATE 2.5 MG/3ML
2.5 SOLUTION RESPIRATORY (INHALATION) EVERY 6 HOURS PRN
Status: DISCONTINUED | OUTPATIENT
Start: 2018-12-06 | End: 2018-12-09 | Stop reason: HOSPADM

## 2018-12-06 RX ORDER — SODIUM CHLORIDE 0.9 % (FLUSH) 0.9 %
1-10 SYRINGE (ML) INJECTION AS NEEDED
Status: DISCONTINUED | OUTPATIENT
Start: 2018-12-06 | End: 2018-12-06 | Stop reason: HOSPADM

## 2018-12-06 RX ORDER — ONDANSETRON 4 MG/1
4 TABLET, ORALLY DISINTEGRATING ORAL EVERY 6 HOURS PRN
Status: DISCONTINUED | OUTPATIENT
Start: 2018-12-06 | End: 2018-12-09 | Stop reason: HOSPADM

## 2018-12-06 RX ORDER — MAGNESIUM HYDROXIDE 1200 MG/15ML
LIQUID ORAL AS NEEDED
Status: DISCONTINUED | OUTPATIENT
Start: 2018-12-06 | End: 2018-12-06 | Stop reason: HOSPADM

## 2018-12-06 RX ORDER — ALBUTEROL SULFATE 2.5 MG/3ML
2.5 SOLUTION RESPIRATORY (INHALATION) ONCE AS NEEDED
Status: DISCONTINUED | OUTPATIENT
Start: 2018-12-06 | End: 2018-12-06 | Stop reason: HOSPADM

## 2018-12-06 RX ORDER — VENLAFAXINE HYDROCHLORIDE 75 MG/1
75 CAPSULE, EXTENDED RELEASE ORAL
Status: DISCONTINUED | OUTPATIENT
Start: 2018-12-06 | End: 2018-12-09 | Stop reason: HOSPADM

## 2018-12-06 RX ORDER — FLUMAZENIL 0.1 MG/ML
0.2 INJECTION INTRAVENOUS AS NEEDED
Status: DISCONTINUED | OUTPATIENT
Start: 2018-12-06 | End: 2018-12-06 | Stop reason: HOSPADM

## 2018-12-06 RX ORDER — SODIUM CHLORIDE 0.9 % (FLUSH) 0.9 %
1-10 SYRINGE (ML) INJECTION AS NEEDED
Status: DISCONTINUED | OUTPATIENT
Start: 2018-12-06 | End: 2018-12-09 | Stop reason: HOSPADM

## 2018-12-06 RX ORDER — FENTANYL CITRATE 50 UG/ML
50 INJECTION, SOLUTION INTRAMUSCULAR; INTRAVENOUS
Status: DISCONTINUED | OUTPATIENT
Start: 2018-12-06 | End: 2018-12-06 | Stop reason: HOSPADM

## 2018-12-06 RX ORDER — HYDRALAZINE HYDROCHLORIDE 20 MG/ML
5 INJECTION INTRAMUSCULAR; INTRAVENOUS
Status: DISCONTINUED | OUTPATIENT
Start: 2018-12-06 | End: 2018-12-06 | Stop reason: HOSPADM

## 2018-12-06 RX ORDER — OXYCODONE HYDROCHLORIDE AND ACETAMINOPHEN 5; 325 MG/1; MG/1
1 TABLET ORAL EVERY 6 HOURS PRN
Status: DISCONTINUED | OUTPATIENT
Start: 2018-12-06 | End: 2018-12-07

## 2018-12-06 RX ORDER — CEFTRIAXONE SODIUM 1 G/50ML
1 INJECTION, SOLUTION INTRAVENOUS EVERY 24 HOURS
Status: DISCONTINUED | OUTPATIENT
Start: 2018-12-06 | End: 2018-12-06 | Stop reason: SDUPTHER

## 2018-12-06 RX ORDER — SODIUM CHLORIDE, SODIUM LACTATE, POTASSIUM CHLORIDE, CALCIUM CHLORIDE 600; 310; 30; 20 MG/100ML; MG/100ML; MG/100ML; MG/100ML
9 INJECTION, SOLUTION INTRAVENOUS CONTINUOUS
Status: DISCONTINUED | OUTPATIENT
Start: 2018-12-06 | End: 2018-12-07

## 2018-12-06 RX ORDER — ACETAMINOPHEN 325 MG/1
650 TABLET ORAL EVERY 6 HOURS PRN
Status: DISCONTINUED | OUTPATIENT
Start: 2018-12-06 | End: 2018-12-09 | Stop reason: HOSPADM

## 2018-12-06 RX ORDER — EPHEDRINE SULFATE 50 MG/ML
INJECTION, SOLUTION INTRAVENOUS AS NEEDED
Status: DISCONTINUED | OUTPATIENT
Start: 2018-12-06 | End: 2018-12-06 | Stop reason: SURG

## 2018-12-06 RX ORDER — CEFTRIAXONE SODIUM 2 G/50ML
2 INJECTION, SOLUTION INTRAVENOUS EVERY 24 HOURS
Status: DISCONTINUED | OUTPATIENT
Start: 2018-12-06 | End: 2018-12-09 | Stop reason: HOSPADM

## 2018-12-06 RX ORDER — ONDANSETRON 4 MG/1
4 TABLET, FILM COATED ORAL EVERY 6 HOURS PRN
Status: DISCONTINUED | OUTPATIENT
Start: 2018-12-06 | End: 2018-12-09 | Stop reason: HOSPADM

## 2018-12-06 RX ORDER — LIDOCAINE HYDROCHLORIDE 10 MG/ML
0.5 INJECTION, SOLUTION EPIDURAL; INFILTRATION; INTRACAUDAL; PERINEURAL ONCE AS NEEDED
Status: DISCONTINUED | OUTPATIENT
Start: 2018-12-06 | End: 2018-12-06 | Stop reason: HOSPADM

## 2018-12-06 RX ORDER — ONDANSETRON 2 MG/ML
4 INJECTION INTRAMUSCULAR; INTRAVENOUS EVERY 6 HOURS PRN
Status: DISCONTINUED | OUTPATIENT
Start: 2018-12-06 | End: 2018-12-09 | Stop reason: HOSPADM

## 2018-12-06 RX ORDER — PROPOFOL 10 MG/ML
VIAL (ML) INTRAVENOUS AS NEEDED
Status: DISCONTINUED | OUTPATIENT
Start: 2018-12-06 | End: 2018-12-06 | Stop reason: SURG

## 2018-12-06 RX ORDER — ROCURONIUM BROMIDE 10 MG/ML
INJECTION, SOLUTION INTRAVENOUS AS NEEDED
Status: DISCONTINUED | OUTPATIENT
Start: 2018-12-06 | End: 2018-12-06 | Stop reason: SURG

## 2018-12-06 RX ADMIN — FAMOTIDINE 20 MG: 10 INJECTION, SOLUTION INTRAVENOUS at 08:57

## 2018-12-06 RX ADMIN — SODIUM CHLORIDE 1000 ML: 9 INJECTION, SOLUTION INTRAVENOUS at 00:55

## 2018-12-06 RX ADMIN — HYDROMORPHONE HYDROCHLORIDE 0.5 MG: 1 INJECTION, SOLUTION INTRAMUSCULAR; INTRAVENOUS; SUBCUTANEOUS at 00:59

## 2018-12-06 RX ADMIN — SODIUM CHLORIDE, PRESERVATIVE FREE 3 ML: 5 INJECTION INTRAVENOUS at 20:46

## 2018-12-06 RX ADMIN — PHENYLEPHRINE HYDROCHLORIDE 100 MCG: 10 INJECTION INTRAVENOUS at 09:53

## 2018-12-06 RX ADMIN — NICOTINE 1 PATCH: 14 PATCH, EXTENDED RELEASE TRANSDERMAL at 20:52

## 2018-12-06 RX ADMIN — LEVOTHYROXINE SODIUM 200 MCG: 100 TABLET ORAL at 07:48

## 2018-12-06 RX ADMIN — PANTOPRAZOLE SODIUM 40 MG: 40 TABLET, DELAYED RELEASE ORAL at 07:47

## 2018-12-06 RX ADMIN — SODIUM CHLORIDE 100 ML/HR: 9 INJECTION, SOLUTION INTRAVENOUS at 12:26

## 2018-12-06 RX ADMIN — PROPOFOL 180 MG: 10 INJECTION, EMULSION INTRAVENOUS at 09:42

## 2018-12-06 RX ADMIN — ONDANSETRON 4 MG: 2 INJECTION INTRAMUSCULAR; INTRAVENOUS at 00:56

## 2018-12-06 RX ADMIN — SODIUM CHLORIDE 100 ML/HR: 9 INJECTION, SOLUTION INTRAVENOUS at 03:39

## 2018-12-06 RX ADMIN — PANTOPRAZOLE SODIUM 40 MG: 40 TABLET, DELAYED RELEASE ORAL at 17:27

## 2018-12-06 RX ADMIN — CEFTRIAXONE SODIUM 2 G: 2 INJECTION, SOLUTION INTRAVENOUS at 20:46

## 2018-12-06 RX ADMIN — FENTANYL CITRATE 50 MCG: 50 INJECTION, SOLUTION INTRAMUSCULAR; INTRAVENOUS at 08:57

## 2018-12-06 RX ADMIN — SODIUM CHLORIDE, POTASSIUM CHLORIDE, SODIUM LACTATE AND CALCIUM CHLORIDE 9 ML/HR: 600; 310; 30; 20 INJECTION, SOLUTION INTRAVENOUS at 08:56

## 2018-12-06 RX ADMIN — DEXAMETHASONE SODIUM PHOSPHATE 8 MG: 10 INJECTION INTRAMUSCULAR; INTRAVENOUS at 09:50

## 2018-12-06 RX ADMIN — PHENYLEPHRINE HYDROCHLORIDE 100 MCG: 10 INJECTION INTRAVENOUS at 09:57

## 2018-12-06 RX ADMIN — OXYCODONE AND ACETAMINOPHEN 1 TABLET: 5; 325 TABLET ORAL at 22:15

## 2018-12-06 RX ADMIN — IBUPROFEN 800 MG: 800 TABLET ORAL at 00:58

## 2018-12-06 RX ADMIN — MIDAZOLAM HYDROCHLORIDE 1 MG: 2 INJECTION, SOLUTION INTRAMUSCULAR; INTRAVENOUS at 08:57

## 2018-12-06 RX ADMIN — SUCCINYLCHOLINE CHLORIDE 140 MG: 20 INJECTION, SOLUTION INTRAMUSCULAR; INTRAVENOUS; PARENTERAL at 09:42

## 2018-12-06 RX ADMIN — FENTANYL CITRATE 50 MCG: 50 INJECTION INTRAMUSCULAR; INTRAVENOUS at 09:40

## 2018-12-06 RX ADMIN — ROCURONIUM BROMIDE 5 MG: 10 INJECTION INTRAVENOUS at 09:42

## 2018-12-06 RX ADMIN — CEFTRIAXONE SODIUM 1 G: 1 INJECTION, SOLUTION INTRAVENOUS at 00:22

## 2018-12-06 RX ADMIN — SODIUM CHLORIDE 100 ML/HR: 9 INJECTION, SOLUTION INTRAVENOUS at 17:27

## 2018-12-06 RX ADMIN — LIDOCAINE HYDROCHLORIDE 100 MG: 20 INJECTION, SOLUTION INFILTRATION; PERINEURAL at 09:42

## 2018-12-06 RX ADMIN — PHENYLEPHRINE HYDROCHLORIDE 200 MCG: 10 INJECTION INTRAVENOUS at 10:02

## 2018-12-06 RX ADMIN — HYDROMORPHONE HYDROCHLORIDE 0.5 MG: 1 INJECTION, SOLUTION INTRAMUSCULAR; INTRAVENOUS; SUBCUTANEOUS at 18:24

## 2018-12-06 RX ADMIN — ONDANSETRON 4 MG: 2 INJECTION INTRAMUSCULAR; INTRAVENOUS at 10:09

## 2018-12-06 RX ADMIN — EPHEDRINE SULFATE 10 MG: 50 INJECTION INTRAMUSCULAR; INTRAVENOUS; SUBCUTANEOUS at 10:05

## 2018-12-06 NOTE — ANESTHESIA PREPROCEDURE EVALUATION
Anesthesia Evaluation     Patient summary reviewed and Nursing notes reviewed   no history of anesthetic complications:  NPO Solid Status: > 6 hours             Airway   Mallampati: III  TM distance: >3 FB  Neck ROM: full  No difficulty expected  Dental    (+) edentulous    Pulmonary - normal exam   (+) asthma, sleep apnea (not using her CPAP),   Cardiovascular - normal exam    (-) angina      Neuro/Psych  (+) psychiatric history Anxiety,     GI/Hepatic/Renal/Endo    (+) morbid obesity, GERD,  hypothyroidism,     Musculoskeletal     Abdominal    Substance History      OB/GYN          Other                        Anesthesia Plan    ASA 3 - emergent     general     Anesthetic plan, all risks, benefits, and alternatives have been provided, discussed and informed consent has been obtained with: patient.

## 2018-12-06 NOTE — ANESTHESIA POSTPROCEDURE EVALUATION
"Patient: Iris Hyde    Procedure Summary     Date:  12/06/18 Room / Location:  [facility] OR 01 / [facility] MAIN OR    Anesthesia Start:  0936 Anesthesia Stop:  1031    Procedure:  CYSTO  RIGHT STENT RIGHT RETROGRADE (Right ) Diagnosis:      Surgeon:  Evin Glover MD Provider:  Cornell Connelly MD    Anesthesia Type:  general ASA Status:  3 - Emergent          Anesthesia Type: general  Last vitals  BP   97/66 (12/06/18 1100)   Temp   36.7 °C (98.1 °F) (12/06/18 1028)   Pulse   80 (12/06/18 1100)   Resp   16 (12/06/18 1100)     SpO2   96 % (12/06/18 1100)     Post Anesthesia Care and Evaluation    Patient location during evaluation: bedside  Patient participation: complete - patient participated  Level of consciousness: sleepy but conscious  Pain score: 0  Pain management: adequate  Airway patency: patent  Anesthetic complications: No anesthetic complications    Cardiovascular status: acceptable  Respiratory status: acceptable  Hydration status: acceptable    Comments: BP 97/66   Pulse 80   Temp 36.7 °C (98.1 °F) (Oral)   Resp 16   Ht 170.2 cm (67\")   Wt 125 kg (276 lb 6.4 oz)   SpO2 96%   BMI 43.29 kg/m²         "

## 2018-12-07 LAB
ANION GAP SERPL CALCULATED.3IONS-SCNC: 10.5 MMOL/L
BACTERIA SPEC AEROBE CULT: NO GROWTH
BASOPHILS # BLD AUTO: 0.02 10*3/MM3 (ref 0–0.2)
BASOPHILS NFR BLD AUTO: 0.1 % (ref 0–1.5)
BUN BLD-MCNC: 24 MG/DL (ref 6–20)
BUN/CREAT SERPL: 23.5 (ref 7–25)
CALCIUM SPEC-SCNC: 8.1 MG/DL (ref 8.6–10.5)
CHLORIDE SERPL-SCNC: 109 MMOL/L (ref 98–107)
CO2 SERPL-SCNC: 20.5 MMOL/L (ref 22–29)
CREAT BLD-MCNC: 1.02 MG/DL (ref 0.57–1)
DEPRECATED RDW RBC AUTO: 50.2 FL (ref 37–54)
EOSINOPHIL # BLD AUTO: 0 10*3/MM3 (ref 0–0.7)
EOSINOPHIL NFR BLD AUTO: 0 % (ref 0.3–6.2)
ERYTHROCYTE [DISTWIDTH] IN BLOOD BY AUTOMATED COUNT: 13.9 % (ref 11.7–13)
GFR SERPL CREATININE-BSD FRML MDRD: 56 ML/MIN/1.73
GLUCOSE BLD-MCNC: 118 MG/DL (ref 65–99)
HCT VFR BLD AUTO: 36.6 % (ref 35.6–45.5)
HGB BLD-MCNC: 11.6 G/DL (ref 11.9–15.5)
IMM GRANULOCYTES # BLD: 0.51 10*3/MM3 (ref 0–0.03)
IMM GRANULOCYTES NFR BLD: 1.7 % (ref 0–0.5)
LYMPHOCYTES # BLD AUTO: 3.05 10*3/MM3 (ref 0.9–4.8)
LYMPHOCYTES NFR BLD AUTO: 10.3 % (ref 19.6–45.3)
MCH RBC QN AUTO: 31.5 PG (ref 26.9–32)
MCHC RBC AUTO-ENTMCNC: 31.7 G/DL (ref 32.4–36.3)
MCV RBC AUTO: 99.5 FL (ref 80.5–98.2)
MONOCYTES # BLD AUTO: 1.48 10*3/MM3 (ref 0.2–1.2)
MONOCYTES NFR BLD AUTO: 5 % (ref 5–12)
NEUTROPHILS # BLD AUTO: 24.41 10*3/MM3 (ref 1.9–8.1)
NEUTROPHILS NFR BLD AUTO: 82.9 % (ref 42.7–76)
PLATELET # BLD AUTO: 154 10*3/MM3 (ref 140–500)
PMV BLD AUTO: 10.5 FL (ref 6–12)
POTASSIUM BLD-SCNC: 3.6 MMOL/L (ref 3.5–5.2)
RBC # BLD AUTO: 3.68 10*6/MM3 (ref 3.9–5.2)
SODIUM BLD-SCNC: 140 MMOL/L (ref 136–145)
WBC NRBC COR # BLD: 29.47 10*3/MM3 (ref 4.5–10.7)

## 2018-12-07 PROCEDURE — 85025 COMPLETE CBC W/AUTO DIFF WBC: CPT | Performed by: UROLOGY

## 2018-12-07 PROCEDURE — 25010000003 CEFTRIAXONE PER 250 MG: Performed by: UROLOGY

## 2018-12-07 PROCEDURE — 80048 BASIC METABOLIC PNL TOTAL CA: CPT | Performed by: UROLOGY

## 2018-12-07 PROCEDURE — 25010000002 KETOROLAC TROMETHAMINE PER 15 MG: Performed by: HOSPITALIST

## 2018-12-07 RX ORDER — CYCLOBENZAPRINE HCL 10 MG
10 TABLET ORAL 3 TIMES DAILY PRN
Status: DISCONTINUED | OUTPATIENT
Start: 2018-12-07 | End: 2018-12-09 | Stop reason: HOSPADM

## 2018-12-07 RX ORDER — OXYCODONE AND ACETAMINOPHEN 10; 325 MG/1; MG/1
1 TABLET ORAL EVERY 4 HOURS PRN
Status: DISCONTINUED | OUTPATIENT
Start: 2018-12-07 | End: 2018-12-09 | Stop reason: HOSPADM

## 2018-12-07 RX ORDER — KETOROLAC TROMETHAMINE 30 MG/ML
15 INJECTION, SOLUTION INTRAMUSCULAR; INTRAVENOUS EVERY 6 HOURS PRN
Status: DISCONTINUED | OUTPATIENT
Start: 2018-12-07 | End: 2018-12-09 | Stop reason: HOSPADM

## 2018-12-07 RX ADMIN — KETOROLAC TROMETHAMINE 15 MG: 30 INJECTION, SOLUTION INTRAMUSCULAR at 09:46

## 2018-12-07 RX ADMIN — CYCLOBENZAPRINE 10 MG: 10 TABLET, FILM COATED ORAL at 18:22

## 2018-12-07 RX ADMIN — PANTOPRAZOLE SODIUM 40 MG: 40 TABLET, DELAYED RELEASE ORAL at 09:35

## 2018-12-07 RX ADMIN — KETOROLAC TROMETHAMINE 15 MG: 30 INJECTION, SOLUTION INTRAMUSCULAR at 01:01

## 2018-12-07 RX ADMIN — SODIUM CHLORIDE, PRESERVATIVE FREE 3 ML: 5 INJECTION INTRAVENOUS at 20:52

## 2018-12-07 RX ADMIN — LEVOTHYROXINE SODIUM 200 MCG: 100 TABLET ORAL at 09:36

## 2018-12-07 RX ADMIN — CYCLOBENZAPRINE 10 MG: 10 TABLET, FILM COATED ORAL at 01:56

## 2018-12-07 RX ADMIN — OXYCODONE HYDROCHLORIDE AND ACETAMINOPHEN 1 TABLET: 10; 325 TABLET ORAL at 17:34

## 2018-12-07 RX ADMIN — LOSARTAN POTASSIUM: 50 TABLET, FILM COATED ORAL at 09:35

## 2018-12-07 RX ADMIN — NICOTINE 1 PATCH: 14 PATCH, EXTENDED RELEASE TRANSDERMAL at 09:37

## 2018-12-07 RX ADMIN — VENLAFAXINE HYDROCHLORIDE 75 MG: 75 CAPSULE, EXTENDED RELEASE ORAL at 09:35

## 2018-12-07 RX ADMIN — SODIUM CHLORIDE 100 ML/HR: 9 INJECTION, SOLUTION INTRAVENOUS at 13:12

## 2018-12-07 RX ADMIN — CYCLOBENZAPRINE 10 MG: 10 TABLET, FILM COATED ORAL at 09:40

## 2018-12-07 RX ADMIN — OXYCODONE HYDROCHLORIDE AND ACETAMINOPHEN 1 TABLET: 10; 325 TABLET ORAL at 22:32

## 2018-12-07 RX ADMIN — PANTOPRAZOLE SODIUM 40 MG: 40 TABLET, DELAYED RELEASE ORAL at 17:34

## 2018-12-07 RX ADMIN — CEFTRIAXONE SODIUM 2 G: 2 INJECTION, SOLUTION INTRAVENOUS at 22:32

## 2018-12-07 RX ADMIN — ACETAMINOPHEN 650 MG: 325 TABLET, FILM COATED ORAL at 13:12

## 2018-12-07 RX ADMIN — SODIUM CHLORIDE, PRESERVATIVE FREE 3 ML: 5 INJECTION INTRAVENOUS at 09:39

## 2018-12-08 LAB
ALBUMIN SERPL-MCNC: 3.2 G/DL (ref 3.5–5.2)
ANION GAP SERPL CALCULATED.3IONS-SCNC: 12 MMOL/L
BACTERIA SPEC AEROBE CULT: ABNORMAL
BASOPHILS # BLD AUTO: 0.02 10*3/MM3 (ref 0–0.2)
BASOPHILS NFR BLD AUTO: 0.1 % (ref 0–1.5)
BUN BLD-MCNC: 14 MG/DL (ref 6–20)
BUN/CREAT SERPL: 17.3 (ref 7–25)
CALCIUM SPEC-SCNC: 8.2 MG/DL (ref 8.6–10.5)
CHLORIDE SERPL-SCNC: 107 MMOL/L (ref 98–107)
CO2 SERPL-SCNC: 23 MMOL/L (ref 22–29)
CREAT BLD-MCNC: 0.81 MG/DL (ref 0.57–1)
DEPRECATED RDW RBC AUTO: 51.4 FL (ref 37–54)
EOSINOPHIL # BLD AUTO: 0.12 10*3/MM3 (ref 0–0.7)
EOSINOPHIL NFR BLD AUTO: 0.8 % (ref 0.3–6.2)
ERYTHROCYTE [DISTWIDTH] IN BLOOD BY AUTOMATED COUNT: 14.5 % (ref 11.7–13)
GFR SERPL CREATININE-BSD FRML MDRD: 73 ML/MIN/1.73
GLUCOSE BLD-MCNC: 86 MG/DL (ref 65–99)
GRAM STN SPEC: ABNORMAL
HCT VFR BLD AUTO: 38.6 % (ref 35.6–45.5)
HGB BLD-MCNC: 12.7 G/DL (ref 11.9–15.5)
IMM GRANULOCYTES # BLD: 0.04 10*3/MM3 (ref 0–0.03)
IMM GRANULOCYTES NFR BLD: 0.3 % (ref 0–0.5)
ISOLATED FROM: ABNORMAL
ISOLATED FROM: ABNORMAL
LYMPHOCYTES # BLD AUTO: 2.9 10*3/MM3 (ref 0.9–4.8)
LYMPHOCYTES NFR BLD AUTO: 18.8 % (ref 19.6–45.3)
MCH RBC QN AUTO: 31.7 PG (ref 26.9–32)
MCHC RBC AUTO-ENTMCNC: 32.9 G/DL (ref 32.4–36.3)
MCV RBC AUTO: 96.3 FL (ref 80.5–98.2)
MONOCYTES # BLD AUTO: 0.63 10*3/MM3 (ref 0.2–1.2)
MONOCYTES NFR BLD AUTO: 4.1 % (ref 5–12)
NEUTROPHILS # BLD AUTO: 11.72 10*3/MM3 (ref 1.9–8.1)
NEUTROPHILS NFR BLD AUTO: 76.2 % (ref 42.7–76)
PHOSPHATE SERPL-MCNC: 2.9 MG/DL (ref 2.5–4.5)
PLAT MORPH BLD: NORMAL
PLATELET # BLD AUTO: 151 10*3/MM3 (ref 140–500)
PMV BLD AUTO: 11.4 FL (ref 6–12)
POTASSIUM BLD-SCNC: 4.1 MMOL/L (ref 3.5–5.2)
RBC # BLD AUTO: 4.01 10*6/MM3 (ref 3.9–5.2)
RBC MORPH BLD: NORMAL
SODIUM BLD-SCNC: 142 MMOL/L (ref 136–145)
WBC MORPH BLD: NORMAL
WBC NRBC COR # BLD: 15.39 10*3/MM3 (ref 4.5–10.7)

## 2018-12-08 PROCEDURE — 80069 RENAL FUNCTION PANEL: CPT | Performed by: HOSPITALIST

## 2018-12-08 PROCEDURE — 85025 COMPLETE CBC W/AUTO DIFF WBC: CPT | Performed by: UROLOGY

## 2018-12-08 PROCEDURE — 87040 BLOOD CULTURE FOR BACTERIA: CPT | Performed by: INTERNAL MEDICINE

## 2018-12-08 PROCEDURE — 25010000003 CEFTRIAXONE PER 250 MG: Performed by: UROLOGY

## 2018-12-08 PROCEDURE — 85007 BL SMEAR W/DIFF WBC COUNT: CPT | Performed by: UROLOGY

## 2018-12-08 RX ORDER — VALACYCLOVIR HYDROCHLORIDE 500 MG/1
2000 TABLET, FILM COATED ORAL EVERY 12 HOURS SCHEDULED
Status: COMPLETED | OUTPATIENT
Start: 2018-12-08 | End: 2018-12-09

## 2018-12-08 RX ADMIN — OXYCODONE HYDROCHLORIDE AND ACETAMINOPHEN 1 TABLET: 10; 325 TABLET ORAL at 20:55

## 2018-12-08 RX ADMIN — OXYCODONE HYDROCHLORIDE AND ACETAMINOPHEN 1 TABLET: 10; 325 TABLET ORAL at 10:16

## 2018-12-08 RX ADMIN — SODIUM CHLORIDE, PRESERVATIVE FREE 3 ML: 5 INJECTION INTRAVENOUS at 21:06

## 2018-12-08 RX ADMIN — PANTOPRAZOLE SODIUM 40 MG: 40 TABLET, DELAYED RELEASE ORAL at 05:14

## 2018-12-08 RX ADMIN — SODIUM CHLORIDE 100 ML/HR: 9 INJECTION, SOLUTION INTRAVENOUS at 14:44

## 2018-12-08 RX ADMIN — LOSARTAN POTASSIUM: 50 TABLET, FILM COATED ORAL at 09:08

## 2018-12-08 RX ADMIN — OXYCODONE HYDROCHLORIDE AND ACETAMINOPHEN 1 TABLET: 10; 325 TABLET ORAL at 14:44

## 2018-12-08 RX ADMIN — PANTOPRAZOLE SODIUM 40 MG: 40 TABLET, DELAYED RELEASE ORAL at 17:08

## 2018-12-08 RX ADMIN — OXYCODONE HYDROCHLORIDE AND ACETAMINOPHEN 1 TABLET: 10; 325 TABLET ORAL at 05:13

## 2018-12-08 RX ADMIN — NICOTINE 1 PATCH: 14 PATCH, EXTENDED RELEASE TRANSDERMAL at 09:09

## 2018-12-08 RX ADMIN — CYCLOBENZAPRINE 10 MG: 10 TABLET, FILM COATED ORAL at 20:55

## 2018-12-08 RX ADMIN — SODIUM CHLORIDE, PRESERVATIVE FREE 3 ML: 5 INJECTION INTRAVENOUS at 09:12

## 2018-12-08 RX ADMIN — LEVOTHYROXINE SODIUM 200 MCG: 100 TABLET ORAL at 05:13

## 2018-12-08 RX ADMIN — VENLAFAXINE HYDROCHLORIDE 75 MG: 75 CAPSULE, EXTENDED RELEASE ORAL at 09:08

## 2018-12-08 RX ADMIN — CYCLOBENZAPRINE 10 MG: 10 TABLET, FILM COATED ORAL at 05:13

## 2018-12-08 RX ADMIN — VALACYCLOVIR 2000 MG: 500 TABLET, FILM COATED ORAL at 20:54

## 2018-12-08 RX ADMIN — SODIUM CHLORIDE 100 ML/HR: 9 INJECTION, SOLUTION INTRAVENOUS at 05:12

## 2018-12-08 RX ADMIN — CEFTRIAXONE SODIUM 2 G: 2 INJECTION, SOLUTION INTRAVENOUS at 21:07

## 2018-12-09 VITALS
HEART RATE: 74 BPM | HEIGHT: 67 IN | RESPIRATION RATE: 18 BRPM | WEIGHT: 276.4 LBS | BODY MASS INDEX: 43.38 KG/M2 | OXYGEN SATURATION: 98 % | TEMPERATURE: 98.4 F | DIASTOLIC BLOOD PRESSURE: 78 MMHG | SYSTOLIC BLOOD PRESSURE: 130 MMHG

## 2018-12-09 LAB
ALBUMIN SERPL-MCNC: 3.3 G/DL (ref 3.5–5.2)
ANION GAP SERPL CALCULATED.3IONS-SCNC: 13.4 MMOL/L
BASOPHILS # BLD AUTO: 0.02 10*3/MM3 (ref 0–0.2)
BASOPHILS NFR BLD AUTO: 0.2 % (ref 0–1.5)
BUN BLD-MCNC: 8 MG/DL (ref 6–20)
BUN/CREAT SERPL: 8.9 (ref 7–25)
CALCIUM SPEC-SCNC: 8.7 MG/DL (ref 8.6–10.5)
CHLORIDE SERPL-SCNC: 103 MMOL/L (ref 98–107)
CO2 SERPL-SCNC: 24.6 MMOL/L (ref 22–29)
CREAT BLD-MCNC: 0.9 MG/DL (ref 0.57–1)
DEPRECATED RDW RBC AUTO: 51 FL (ref 37–54)
EOSINOPHIL # BLD AUTO: 0.1 10*3/MM3 (ref 0–0.7)
EOSINOPHIL NFR BLD AUTO: 1.1 % (ref 0.3–6.2)
ERYTHROCYTE [DISTWIDTH] IN BLOOD BY AUTOMATED COUNT: 13.9 % (ref 11.7–13)
GFR SERPL CREATININE-BSD FRML MDRD: 65 ML/MIN/1.73
GLUCOSE BLD-MCNC: 88 MG/DL (ref 65–99)
HCT VFR BLD AUTO: 40.4 % (ref 35.6–45.5)
HGB BLD-MCNC: 12.7 G/DL (ref 11.9–15.5)
IMM GRANULOCYTES # BLD: 0.02 10*3/MM3 (ref 0–0.03)
IMM GRANULOCYTES NFR BLD: 0.2 % (ref 0–0.5)
LYMPHOCYTES # BLD AUTO: 2.69 10*3/MM3 (ref 0.9–4.8)
LYMPHOCYTES NFR BLD AUTO: 30.2 % (ref 19.6–45.3)
MCH RBC QN AUTO: 31.4 PG (ref 26.9–32)
MCHC RBC AUTO-ENTMCNC: 31.4 G/DL (ref 32.4–36.3)
MCV RBC AUTO: 100 FL (ref 80.5–98.2)
MONOCYTES # BLD AUTO: 0.53 10*3/MM3 (ref 0.2–1.2)
MONOCYTES NFR BLD AUTO: 5.9 % (ref 5–12)
NEUTROPHILS # BLD AUTO: 5.56 10*3/MM3 (ref 1.9–8.1)
NEUTROPHILS NFR BLD AUTO: 62.4 % (ref 42.7–76)
PHOSPHATE SERPL-MCNC: 3 MG/DL (ref 2.5–4.5)
PLATELET # BLD AUTO: 157 10*3/MM3 (ref 140–500)
PMV BLD AUTO: 10.8 FL (ref 6–12)
POTASSIUM BLD-SCNC: 3.4 MMOL/L (ref 3.5–5.2)
RBC # BLD AUTO: 4.04 10*6/MM3 (ref 3.9–5.2)
SODIUM BLD-SCNC: 141 MMOL/L (ref 136–145)
WBC NRBC COR # BLD: 8.92 10*3/MM3 (ref 4.5–10.7)

## 2018-12-09 PROCEDURE — 02HV33Z INSERTION OF INFUSION DEVICE INTO SUPERIOR VENA CAVA, PERCUTANEOUS APPROACH: ICD-10-PCS | Performed by: HOSPITALIST

## 2018-12-09 PROCEDURE — 80069 RENAL FUNCTION PANEL: CPT | Performed by: HOSPITALIST

## 2018-12-09 PROCEDURE — 85025 COMPLETE CBC W/AUTO DIFF WBC: CPT | Performed by: UROLOGY

## 2018-12-09 PROCEDURE — C1751 CATH, INF, PER/CENT/MIDLINE: HCPCS

## 2018-12-09 PROCEDURE — 4A02X4A MEASUREMENT OF CARDIAC ELECTRICAL ACTIVITY, GUIDANCE, EXTERNAL APPROACH: ICD-10-PCS | Performed by: HOSPITALIST

## 2018-12-09 RX ORDER — OXYCODONE AND ACETAMINOPHEN 10; 325 MG/1; MG/1
1 TABLET ORAL EVERY 4 HOURS PRN
Qty: 15 TABLET | Refills: 0 | Status: SHIPPED | OUTPATIENT
Start: 2018-12-09 | End: 2018-12-17

## 2018-12-09 RX ORDER — ACYCLOVIR 50 MG/G
OINTMENT TOPICAL EVERY 4 HOURS
Qty: 5 G | Refills: 0 | Status: SHIPPED | OUTPATIENT
Start: 2018-12-09 | End: 2018-12-12

## 2018-12-09 RX ORDER — SODIUM CHLORIDE 0.9 % (FLUSH) 0.9 %
10 SYRINGE (ML) INJECTION EVERY 12 HOURS SCHEDULED
Status: DISCONTINUED | OUTPATIENT
Start: 2018-12-09 | End: 2018-12-09 | Stop reason: HOSPADM

## 2018-12-09 RX ORDER — CEFTRIAXONE SODIUM 2 G/50ML
2 INJECTION, SOLUTION INTRAVENOUS EVERY 24 HOURS
Qty: 350 ML | Refills: 0
Start: 2018-12-09 | End: 2018-12-20

## 2018-12-09 RX ORDER — SODIUM CHLORIDE 0.9 % (FLUSH) 0.9 %
20 SYRINGE (ML) INJECTION AS NEEDED
Status: DISCONTINUED | OUTPATIENT
Start: 2018-12-09 | End: 2018-12-09 | Stop reason: HOSPADM

## 2018-12-09 RX ORDER — SODIUM CHLORIDE 0.9 % (FLUSH) 0.9 %
10 SYRINGE (ML) INJECTION AS NEEDED
Status: DISCONTINUED | OUTPATIENT
Start: 2018-12-09 | End: 2018-12-09 | Stop reason: HOSPADM

## 2018-12-09 RX ADMIN — VENLAFAXINE HYDROCHLORIDE 75 MG: 75 CAPSULE, EXTENDED RELEASE ORAL at 08:10

## 2018-12-09 RX ADMIN — LOSARTAN POTASSIUM: 50 TABLET, FILM COATED ORAL at 08:08

## 2018-12-09 RX ADMIN — VALACYCLOVIR 2000 MG: 500 TABLET, FILM COATED ORAL at 08:10

## 2018-12-09 RX ADMIN — SODIUM CHLORIDE, PRESERVATIVE FREE 3 ML: 5 INJECTION INTRAVENOUS at 08:10

## 2018-12-09 RX ADMIN — LEVOTHYROXINE SODIUM 200 MCG: 100 TABLET ORAL at 06:08

## 2018-12-09 RX ADMIN — CYCLOBENZAPRINE 10 MG: 10 TABLET, FILM COATED ORAL at 06:08

## 2018-12-09 RX ADMIN — PANTOPRAZOLE SODIUM 40 MG: 40 TABLET, DELAYED RELEASE ORAL at 06:08

## 2018-12-09 RX ADMIN — OXYCODONE HYDROCHLORIDE AND ACETAMINOPHEN 1 TABLET: 10; 325 TABLET ORAL at 06:08

## 2018-12-09 RX ADMIN — NICOTINE 1 PATCH: 14 PATCH, EXTENDED RELEASE TRANSDERMAL at 08:08

## 2018-12-10 ENCOUNTER — READMISSION MANAGEMENT (OUTPATIENT)
Dept: CALL CENTER | Facility: HOSPITAL | Age: 58
End: 2018-12-10

## 2018-12-11 ENCOUNTER — READMISSION MANAGEMENT (OUTPATIENT)
Dept: CALL CENTER | Facility: HOSPITAL | Age: 58
End: 2018-12-11

## 2018-12-11 NOTE — OUTREACH NOTE
Prep Survey      Responses   Facility patient discharged from?  [facility]   Is patient eligible?  Yes   Discharge diagnosis  hydronephrosis with obstruction, Sepsis,    Does the patient have one of the following disease processes/diagnoses(primary or secondary)?  Sepsis   Does the patient have Home health ordered?  Yes   What is the Home health agency?   [facility] for IV roctphin, PICC care and labs   Is there a DME ordered?  No   Comments regarding appointments  pt to schedule   Medication alerts for this patient  IV rocephin   Prep survey completed?  Yes          Anais Fisher RN

## 2018-12-11 NOTE — OUTREACH NOTE
Sepsis Week 1 Survey      Responses   Facility patient discharged from?  [facility]   Does the patient have one of the following disease processes/diagnoses(primary or secondary)?  Sepsis   Is there a successful TCM telephone encounter documented?  No   Week 1 attempt successful?  No   Unsuccessful attempts  Attempt 1          Paige Ireland RN

## 2018-12-12 ENCOUNTER — READMISSION MANAGEMENT (OUTPATIENT)
Dept: CALL CENTER | Facility: HOSPITAL | Age: 58
End: 2018-12-12

## 2018-12-12 NOTE — OUTREACH NOTE
Sepsis Week 1 Survey      Responses   Facility patient discharged from?  [facility]   Does the patient have one of the following disease processes/diagnoses(primary or secondary)?  Sepsis   Is there a successful TCM telephone encounter documented?  No   Week 1 attempt successful?  Yes   Call start time  1539   Call end time  1549   Discharge diagnosis  hydronephrosis with obstruction, Sepsis,    Is patient permission given to speak with other caregiver?  No   Meds reviewed with patient/caregiver?  Yes   Is the patient having any side effects they believe may be caused by any medication additions or changes?  No   Does the patient have all medications related to this admission filled (includes all antibiotics, inhalers, nebulizers,steroids,etc.)  Yes   Is the patient taking all medications as directed (includes completed medication regime)?  Yes   Does the patient have a primary care provider?   Yes   Comments regarding PCP  Adarsh Willis MD   Does the patient have an appointment with their PCP within 7 days of discharge?  No   Nursing Interventions  Advised patient to make appointment   Has the patient kept scheduled appointments due by today?  N/A   Comments  Patient states that she has appt with surgeon on Monday 12/17/18.    What is the Home health agency?   [facility] for IV roctphin, PICC care and labs   Has home health visited the patient within 72 hours of discharge?  Yes   Psychosocial issues?  No   Did the patient receive a copy of their discharge instructions?  Yes   Nursing interventions  Reviewed instructions with patient   What is the patient's perception of their health status since discharge?  Same   Nursing interventions  Nurse provided patient education   Is the patient/caregiver able to teach back Sepsis?  S - Shivering,fever or very cold, E - Extreme pain or generalized discomfort (worst ever,especially abdomen)   Nursing interventions  Nurse provided reassurance to patient, Nurse provided  patient education   Is patient/caregiver able to teach back steps to recovery at home?  Rest and regain strength   Is the patient/caregiver able to teach back signs and symptoms of worsening condition:  Fever, Hyperthermia   Is the patient/caregiver able to teach back the hierarchy of who to call/visit for symptoms/problems? PCP, Specialist, Home health nurse, Urgent Care, ED, 911  Yes   Week 1 call completed?  Yes          Sheryl Rosales RN

## 2018-12-13 LAB
BACTERIA SPEC AEROBE CULT: NORMAL
BACTERIA SPEC AEROBE CULT: NORMAL

## 2018-12-19 ENCOUNTER — READMISSION MANAGEMENT (OUTPATIENT)
Dept: CALL CENTER | Facility: HOSPITAL | Age: 58
End: 2018-12-19

## 2018-12-19 NOTE — OUTREACH NOTE
Sepsis Week 2 Survey      Responses   Facility patient discharged from?  [facility]   Does the patient have one of the following disease processes/diagnoses(primary or secondary)?  Sepsis   Week 2 attempt successful?  Yes   Call start time  1617   Call end time  1623   Discharge diagnosis  hydronephrosis with obstruction, Sepsis,    Medication alerts for this patient  IV rocephin   Meds reviewed with patient/caregiver?  Yes   Is the patient having any side effects they believe may be caused by any medication additions or changes?  No   Does the patient have all medications related to this admission filled (includes all antibiotics, inhalers, nebulizers,steroids,etc.)  Yes   Is the patient taking all medications as directed (includes completed medication regime)?  Yes   Does the patient have a primary care provider?   Yes   Comments regarding PCP  Adarsh Willis MD   Does the patient have an appointment with their PCP within 7 days of discharge?  No   What is preventing the patient from scheduling follow up appointments within 7 days of discharge?  Haven't had time   Nursing Interventions  Advised patient to make appointment   Has the patient kept scheduled appointments due by today?  N/A   Comments  Patient missed appt 12/17 but is going in 1220/18.   What is the Home health agency?   [facility] for IV roctphin, PICC care and labs   Has home health visited the patient within 72 hours of discharge?  Yes   Psychosocial issues?  No   Did the patient receive a copy of their discharge instructions?  Yes   Nursing interventions  Reviewed instructions with patient   What is the patient's perception of their health status since discharge?  Improving   Nursing interventions  Nurse provided patient education   Is the patient/caregiver able to teach back Sepsis?  S - Shivering,fever or very cold, E - Extreme pain or generalized discomfort (worst ever,especially abdomen), P - Pale or discolored skin, S - Sleepy, difficult  to arouse,confused, I -   I feel like I might die-a feeling of hopelessness, S - Short of breath   Nursing interventions  Nurse provided reassurance to patient   Is patient/caregiver able to teach back steps to recovery at home?  Set small, achievable goals for return to baseline health, Rest and regain strength, Eat a balanced diet, Exercise as tolerated, Make a list of questions for PCP appoinment   Is the patient/caregiver able to teach back signs and symptoms of worsening condition:  Fever, Edema   Is the patient/caregiver able to teach back the hierarchy of who to call/visit for symptoms/problems? 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   Week 2 call completed?  Yes          Tessie Gimenez RN

## 2018-12-27 ENCOUNTER — READMISSION MANAGEMENT (OUTPATIENT)
Dept: CALL CENTER | Facility: HOSPITAL | Age: 58
End: 2018-12-27

## 2018-12-27 NOTE — OUTREACH NOTE
Sepsis Week 3 Survey      Responses   Facility patient discharged from?  [place]   Does the patient have one of the following disease processes/diagnoses(primary or secondary)?  Sepsis   Week 3 attempt successful?  Yes   Call start time  1530   Call end time  1533   Meds reviewed with patient/caregiver?  Yes   Is the patient taking all medications as directed (includes completed medication regime)?  Yes   Comments regarding appointments  Had laser surgery on kidney stone 12/26 and has been seeing Drs. as ordered   Has the patient kept scheduled appointments due by today?  Yes   Comments  when they pulled stent, stone came out also, PICC was discontinued today and started on antibiotics oral , PO Keflex   What is the Home health agency?   PICC out   What is the patient's perception of their health status since discharge?  Improving   Week 3 call completed?  Yes   Wrap up additional comments  PICC out , Kidney stone removed          Paige Ireland RN

## 2019-01-15 RX ORDER — VARENICLINE TARTRATE 1 MG/1
1 TABLET, FILM COATED ORAL 2 TIMES DAILY
Qty: 60 TABLET | Refills: 4 | Status: SHIPPED | OUTPATIENT
Start: 2019-01-15 | End: 2019-08-08 | Stop reason: SINTOL

## 2019-01-15 NOTE — TELEPHONE ENCOUNTER
Dr. Willis I called the pt and she needs the continuation pack and not the starting kit.    Thank you.

## 2019-01-25 ENCOUNTER — HOSPITAL ENCOUNTER (EMERGENCY)
Facility: HOSPITAL | Age: 59
Discharge: HOME | End: 2019-01-25
Attending: EMERGENCY MEDICINE | Admitting: EMERGENCY MEDICINE

## 2019-01-25 ENCOUNTER — APPOINTMENT (OUTPATIENT)
Dept: GENERAL RADIOLOGY | Facility: HOSPITAL | Age: 59
End: 2019-01-25

## 2019-01-25 VITALS
HEART RATE: 94 BPM | WEIGHT: 273.3 LBS | TEMPERATURE: 97.1 F | SYSTOLIC BLOOD PRESSURE: 154 MMHG | DIASTOLIC BLOOD PRESSURE: 107 MMHG | RESPIRATION RATE: 16 BRPM | HEIGHT: 67 IN | BODY MASS INDEX: 42.9 KG/M2 | OXYGEN SATURATION: 97 %

## 2019-01-25 DIAGNOSIS — R10.13 EPIGASTRIC PAIN: Primary | ICD-10-CM

## 2019-01-25 DIAGNOSIS — K31.84 GASTROPARESIS: ICD-10-CM

## 2019-01-25 LAB
ALBUMIN SERPL-MCNC: 4.1 G/DL (ref 3.5–5.2)
ALBUMIN/GLOB SERPL: 1.2 G/DL
ALP SERPL-CCNC: 134 U/L (ref 39–117)
ALT SERPL W P-5'-P-CCNC: 27 U/L (ref 1–33)
ANION GAP SERPL CALCULATED.3IONS-SCNC: 15.5 MMOL/L
AST SERPL-CCNC: 23 U/L (ref 1–32)
BASOPHILS # BLD AUTO: 0.01 10*3/MM3 (ref 0–0.2)
BASOPHILS NFR BLD AUTO: 0.1 % (ref 0–1.5)
BILIRUB SERPL-MCNC: 0.4 MG/DL (ref 0.1–1.2)
BUN BLD-MCNC: 12 MG/DL (ref 6–20)
BUN/CREAT SERPL: 9.8 (ref 7–25)
CALCIUM SPEC-SCNC: 9.5 MG/DL (ref 8.6–10.5)
CHLORIDE SERPL-SCNC: 102 MMOL/L (ref 98–107)
CO2 SERPL-SCNC: 22.5 MMOL/L (ref 22–29)
CREAT BLD-MCNC: 1.23 MG/DL (ref 0.57–1)
DEPRECATED RDW RBC AUTO: 46.8 FL (ref 37–54)
EOSINOPHIL # BLD AUTO: 0.07 10*3/MM3 (ref 0–0.7)
EOSINOPHIL NFR BLD AUTO: 0.6 % (ref 0.3–6.2)
ERYTHROCYTE [DISTWIDTH] IN BLOOD BY AUTOMATED COUNT: 13.1 % (ref 11.7–13)
GFR SERPL CREATININE-BSD FRML MDRD: 45 ML/MIN/1.73
GLOBULIN UR ELPH-MCNC: 3.5 GM/DL
GLUCOSE BLD-MCNC: 103 MG/DL (ref 65–99)
HCT VFR BLD AUTO: 46.4 % (ref 35.6–45.5)
HGB BLD-MCNC: 15.4 G/DL (ref 11.9–15.5)
IMM GRANULOCYTES # BLD AUTO: 0.04 10*3/MM3 (ref 0–0.03)
IMM GRANULOCYTES NFR BLD AUTO: 0.4 % (ref 0–0.5)
LIPASE SERPL-CCNC: 34 U/L (ref 13–60)
LYMPHOCYTES # BLD AUTO: 4.4 10*3/MM3 (ref 0.9–4.8)
LYMPHOCYTES NFR BLD AUTO: 39.4 % (ref 19.6–45.3)
MCH RBC QN AUTO: 32.4 PG (ref 26.9–32)
MCHC RBC AUTO-ENTMCNC: 33.2 G/DL (ref 32.4–36.3)
MCV RBC AUTO: 97.5 FL (ref 80.5–98.2)
MONOCYTES # BLD AUTO: 0.96 10*3/MM3 (ref 0.2–1.2)
MONOCYTES NFR BLD AUTO: 8.6 % (ref 5–12)
NEUTROPHILS # BLD AUTO: 5.68 10*3/MM3 (ref 1.9–8.1)
NEUTROPHILS NFR BLD AUTO: 50.9 % (ref 42.7–76)
PLATELET # BLD AUTO: 226 10*3/MM3 (ref 140–500)
PMV BLD AUTO: 10.1 FL (ref 6–12)
POTASSIUM BLD-SCNC: 3.8 MMOL/L (ref 3.5–5.2)
PROT SERPL-MCNC: 7.6 G/DL (ref 6–8.5)
RBC # BLD AUTO: 4.76 10*6/MM3 (ref 3.9–5.2)
SODIUM BLD-SCNC: 140 MMOL/L (ref 136–145)
TROPONIN T SERPL-MCNC: <0.01 NG/ML (ref 0–0.03)
WBC NRBC COR # BLD: 11.16 10*3/MM3 (ref 4.5–10.7)

## 2019-01-25 PROCEDURE — 93005 ELECTROCARDIOGRAM TRACING: CPT

## 2019-01-25 PROCEDURE — 96374 THER/PROPH/DIAG INJ IV PUSH: CPT

## 2019-01-25 PROCEDURE — 83690 ASSAY OF LIPASE: CPT | Performed by: EMERGENCY MEDICINE

## 2019-01-25 PROCEDURE — 85025 COMPLETE CBC W/AUTO DIFF WBC: CPT | Performed by: EMERGENCY MEDICINE

## 2019-01-25 PROCEDURE — 84484 ASSAY OF TROPONIN QUANT: CPT | Performed by: EMERGENCY MEDICINE

## 2019-01-25 PROCEDURE — 99284 EMERGENCY DEPT VISIT MOD MDM: CPT

## 2019-01-25 PROCEDURE — 80053 COMPREHEN METABOLIC PANEL: CPT | Performed by: EMERGENCY MEDICINE

## 2019-01-25 PROCEDURE — 93005 ELECTROCARDIOGRAM TRACING: CPT | Performed by: EMERGENCY MEDICINE

## 2019-01-25 PROCEDURE — 93010 ELECTROCARDIOGRAM REPORT: CPT | Performed by: INTERNAL MEDICINE

## 2019-01-25 PROCEDURE — 25010000002 ONDANSETRON PER 1 MG: Performed by: EMERGENCY MEDICINE

## 2019-01-25 PROCEDURE — 71045 X-RAY EXAM CHEST 1 VIEW: CPT

## 2019-01-25 RX ORDER — SODIUM CHLORIDE 0.9 % (FLUSH) 0.9 %
10 SYRINGE (ML) INJECTION AS NEEDED
Status: DISCONTINUED | OUTPATIENT
Start: 2019-01-25 | End: 2019-01-26 | Stop reason: HOSPADM

## 2019-01-25 RX ORDER — ONDANSETRON 4 MG/1
4 TABLET, ORALLY DISINTEGRATING ORAL EVERY 6 HOURS PRN
Qty: 15 TABLET | Refills: 0 | Status: SHIPPED | OUTPATIENT
Start: 2019-01-25 | End: 2019-08-08

## 2019-01-25 RX ORDER — ALUMINA, MAGNESIA, AND SIMETHICONE 2400; 2400; 240 MG/30ML; MG/30ML; MG/30ML
15 SUSPENSION ORAL ONCE
Status: COMPLETED | OUTPATIENT
Start: 2019-01-25 | End: 2019-01-25

## 2019-01-25 RX ORDER — ONDANSETRON 2 MG/ML
4 INJECTION INTRAMUSCULAR; INTRAVENOUS ONCE
Status: COMPLETED | OUTPATIENT
Start: 2019-01-25 | End: 2019-01-25

## 2019-01-25 RX ADMIN — ONDANSETRON HYDROCHLORIDE 4 MG: 2 SOLUTION INTRAMUSCULAR; INTRAVENOUS at 22:53

## 2019-01-25 RX ADMIN — LIDOCAINE HYDROCHLORIDE 15 ML: 20 SOLUTION ORAL; TOPICAL at 22:16

## 2019-01-25 RX ADMIN — ALUMINUM HYDROXIDE, MAGNESIUM HYDROXIDE, AND DIMETHICONE 15 ML: 400; 400; 40 SUSPENSION ORAL at 22:16

## 2019-01-26 LAB
HOLD SPECIMEN: NORMAL
WHOLE BLOOD HOLD SPECIMEN: NORMAL

## 2019-01-26 NOTE — ED PROVIDER NOTES
" EMERGENCY DEPARTMENT ENCOUNTER    CHIEF COMPLAINT  Chief Complaint: cp  History given by: patient  History limited by: none  Room Number: 13/13  PMD: Adarsh Willis MD      HPI:  Pt is a 58 y.o. female who presents complaining of cp that began 3 hours ago under her breast described as squeezing. Pt states that \"it might be a pulled muscle.\" Pt states she has gastroparesis and has been vomiting (green in color) for 3 days. Pt has hx of fibromyalgia. Pt is followed by  (GI). Pt states she has family cardiac hx. Pt has had cholecystectomy. Pt states she smoked THC with last use a week ago and drinks occasionally. Pt states she recently stopped smoking cigarettes.      Duration:  3 hours  Onset: gradual  Timing: constant  Location: under breast  Quality: squeezing  Intensity/Severity: moderate  Progression: unchanged  Associated Symptoms: vomiting  Previous Episodes: Pt has hx of gastroparesis.       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   • Sleep apnea 06/14/2017   • Disorder of thyroid 06/14/2017   • Increased frequency of urination 06/14/2017   • Dysuria 06/14/2017   • Infection of urinary tract 06/14/2017   • Obesity, morbid, BMI 40.0-49.9 (CMS/HCC) 06/14/2017   • Cigarette smoker 10/10/2017   • Mixed anxiety depressive disorder 07/20/2018   • Gastroparesis 07/23/2018   • Adenomatous polyps 07/23/2018   • " Gastroesophageal reflux disease with esophagitis 07/23/2018   • Hx of adenomatous colonic polyps 08/22/2018   • Ureterolithiasis 12/06/2018   • Hydronephrosis with urinary obstruction due to ureteral calculus 12/06/2018   • Sepsis, unspecified organism (CMS/HCC) 12/06/2018     Resolved Ambulatory Problems     Diagnosis Date Noted   • Non-specific colitis 06/14/2017   • Nausea and vomiting 06/14/2017   • Noninfectious gastroenteritis 06/14/2017     Past Medical History:   Diagnosis Date   • Alkaline phosphatase elevation    • Alopecia    • Anxiety    • Arthritis    • Asthmatic bronchitis    • Bladder spasms    • Chronic low back pain    • Colitis    • Depression    • Eczema    • Esophageal reflux    • Fibromyalgia    • Headache    • High risk medication use    • Migraine headache    • Neoplasm of uncertain behavior of breast    • Skin cancer    • Sleep apnea with use of continuous positive airway pressure (CPAP)    • Thyroid disorder    • Urinary frequency    • Urinary pain        PAST SURGICAL HISTORY  Past Surgical History:   Procedure Laterality Date   • APPENDECTOMY  1980    DR. MATIAS   • CHOLECYSTECTOMY  1989    DR. IGOR FUNES   • COLONOSCOPY  09/20/2013    Two 8-9mm polyps in the rectum and in the sigmoid colon, 5mm polyp in the transverse colon, two 2-3mm polyps in the sigmoid colon, NBIH.  PATH: Tubular adenom, hyperplastic changes.    • COLONOSCOPY N/A 8/21/2018    Procedure: COLONOSCOPY INTO CECUM WITH HOT SNARE POLYPECTOMIES;  Surgeon: Citlaly Biggs MD;  Location: [facility] ENDOSCOPY;  Service: Gastroenterology   • CRANIOTOMY  1996   • CYSTOSCOPY W/ URETERAL STENT PLACEMENT Right 12/6/2018    Procedure: CYSTO  RIGHT STENT RIGHT RETROGRADE;  Surgeon: Evin Glover MD;  Location: [facility] OR;  Service: Urology   • ENDOSCOPY  08/29/2013    LA Grade A reflux esophagitis, HH, erosive gastritis, A single small papule with no stigmata of recent bleeding was found.  PATH:Benign.    • ENDOSCOPY N/A  8/21/2018    Procedure: ESOPHAGOGASTRODUODENOSCOPY with biopsies;  Surgeon: Citlaly Biggs MD;  Location: [facility] ENDOSCOPY;  Service: Gastroenterology   • TUBAL ABDOMINAL LIGATION  1982       FAMILY HISTORY  Family History   Problem Relation Age of Onset   • Heart disease Other    • Diabetes Other    • Alcohol abuse Other    • Anxiety disorder Other    • Bipolar disorder Other    • Cancer Other    • Depression Other    • Lung disease Other    • Kidney disease Other    • Rheum arthritis Other    • Thyroid disease Other        SOCIAL HISTORY  Social History     Socioeconomic History   • Marital status:      Spouse name: Not on file   • Number of children: Not on file   • Years of education: Not on file   • Highest education level: Not on file   Social Needs   • Financial resource strain: Not on file   • Food insecurity - worry: Not on file   • Food insecurity - inability: Not on file   • Transportation needs - medical: Not on file   • Transportation needs - non-medical: Not on file   Occupational History   • Not on file   Tobacco Use   • Smoking status: Current Every Day Smoker     Packs/day: 1.00     Years: 43.00     Pack years: 43.00   • Smokeless tobacco: Never Used   • Tobacco comment: Began smoking age 14.  Smoked 1 ppd for 43 pack year history.   Substance and Sexual Activity   • Alcohol use: Yes     Comment: Rare   • Drug use: Yes     Types: Marijuana     Comment: Daily for pain management   • Sexual activity: No   Other Topics Concern   • Not on file   Social History Narrative   • Not on file       ALLERGIES  Butorphanol; Butorphanol tartrate; Hydrocodone-acetaminophen; Cymbalta [duloxetine hcl]; and Hydrocodone-acetaminophen    REVIEW OF SYSTEMS  Review of Systems   Constitutional: Negative for fever.   HENT: Negative for sore throat.    Eyes: Negative.    Respiratory: Negative for cough and shortness of breath.    Cardiovascular: Positive for chest pain (under bilateral breast).    Gastrointestinal: Positive for vomiting. Negative for abdominal pain and diarrhea.   Genitourinary: Negative for dysuria.   Musculoskeletal: Negative for neck pain.   Skin: Negative for rash.   Neurological: Negative for weakness, numbness and headaches.   Hematological: Negative.    Psychiatric/Behavioral: Negative.    All other systems reviewed and are negative.      PHYSICAL EXAM  ED Triage Vitals [01/25/19 2028]   Temp Heart Rate Resp BP SpO2   97.1 °F (36.2 °C) 115 18 -- 95 %      Temp src Heart Rate Source Patient Position BP Location FiO2 (%)   Tympanic -- -- -- --       Physical Exam   Constitutional: She is oriented to person, place, and time. No distress.   HENT:   Head: Normocephalic and atraumatic.   Eyes: EOM are normal. Pupils are equal, round, and reactive to light.   Neck: Normal range of motion. Neck supple.   Cardiovascular: Normal rate, regular rhythm and normal heart sounds.   Pulmonary/Chest: Effort normal and breath sounds normal. No respiratory distress.   Abdominal: Soft. There is tenderness (epigastric ). There is no rebound and no guarding.   obese   Musculoskeletal: Normal range of motion. She exhibits no edema.   Neurological: She is alert and oriented to person, place, and time. She has normal sensation and normal strength.   Skin: Skin is warm and dry. No rash noted.   Psychiatric: Mood and affect normal.   Nursing note and vitals reviewed.      LAB RESULTS  Lab Results (last 24 hours)     Procedure Component Value Units Date/Time    CBC & Differential [[id number]] Collected:  01/25/19 2249    Specimen:  Blood Updated:  01/25/19 2258    Narrative:       The following orders were created for panel order CBC & Differential.  Procedure                               Abnormality         Status                     ---------                               -----------         ------                     CBC Auto Differential[[id number]]        Abnormal            Final result                  Please view results for these tests on the individual orders.    Comprehensive Metabolic Panel [[id number]]  (Abnormal) Collected:  01/25/19 2249    Specimen:  Blood Updated:  01/25/19 2322     Glucose 103 mg/dL      BUN 12 mg/dL      Creatinine 1.23 mg/dL      Sodium 140 mmol/L      Potassium 3.8 mmol/L      Chloride 102 mmol/L      CO2 22.5 mmol/L      Calcium 9.5 mg/dL      Total Protein 7.6 g/dL      Albumin 4.10 g/dL      ALT (SGPT) 27 U/L      AST (SGOT) 23 U/L      Alkaline Phosphatase 134 U/L      Total Bilirubin 0.4 mg/dL      eGFR Non African Amer 45 mL/min/1.73      Globulin 3.5 gm/dL      A/G Ratio 1.2 g/dL      BUN/Creatinine Ratio 9.8     Anion Gap 15.5 mmol/L     Lipase [[id number]]  (Normal) Collected:  01/25/19 2249    Specimen:  Blood Updated:  01/25/19 2322     Lipase 34 U/L     Troponin [[id number]]  (Normal) Collected:  01/25/19 2249    Specimen:  Blood Updated:  01/25/19 2322     Troponin T <0.010 ng/mL     Narrative:       Troponin T Reference Range:  <= 0.03 ng/mL-   Negative for AMI  >0.03 ng/mL-     Abnormal for myocardial necrosis.  Clinicians would have to utilize clinical acumen, EKG, Troponin and serial changes to determine if it is an Acute Myocardial Infarction or myocardial injury due to an underlying chronic condition.     CBC Auto Differential [[id number]]  (Abnormal) Collected:  01/25/19 2249    Specimen:  Blood Updated:  01/25/19 2258     WBC 11.16 10*3/mm3      RBC 4.76 10*6/mm3      Hemoglobin 15.4 g/dL      Hematocrit 46.4 %      MCV 97.5 fL      MCH 32.4 pg      MCHC 33.2 g/dL      RDW 13.1 %      RDW-SD 46.8 fl      MPV 10.1 fL      Platelets 226 10*3/mm3      Neutrophil % 50.9 %      Lymphocyte % 39.4 %      Monocyte % 8.6 %      Eosinophil % 0.6 %      Basophil % 0.1 %      Immature Grans % 0.4 %      Neutrophils, Absolute 5.68 10*3/mm3      Lymphocytes, Absolute 4.40 10*3/mm3      Monocytes, Absolute 0.96 10*3/mm3      Eosinophils, Absolute 0.07 10*3/mm3      Basophils,  Absolute 0.01 10*3/mm3      Immature Grans, Absolute 0.04 10*3/mm3           I ordered the above labs and reviewed the results    RADIOLOGY  XR Chest 1 View   Final Result   No acute findings.       This report was finalized on 1/25/2019 10:49 PM by Dr. Cherry Beebe M.D.               I ordered the above noted radiological studies. Interpreted by radiologist. Reviewed by me in PACS.       PROCEDURES  Procedures  EKG          EKG time: 2053  Rhythm/Rate:  BPM  P waves and LA: LAE  QRS, axis: INIVCD with low voltage in pre-cordial leads   ST and T waves: non-specific ST changes     Interpreted Contemporaneously by me, independently viewed  No prior for comparison.     PROGRESS AND CONSULTS     2146-Ordered CXR and lab work for further evaluation. Ordered Zofran and GI cocktail for nausea.    11:34 PM  Labs, EKG as above.  Patient had a large BM and states she feels better, although still a bit nauseated.  She feels like her gastroparesis is going to act up soon, and prefers to be discharged home.  No emesis in the ER, and she overall feels much better per her report.  Will DC with Rx for zofran.    My clinical suspicion for a serious underlying pulmonary or cardiac etiology is low.  There is no evidence to suggest pulmonary embolism, aortic dissection or acute coronary syndrome.  The patient was advised to return to the emergency department should the symptoms worsen or for any new concerns.  The patient can be safely followed as an outpatient for further workup as indicated.    Discussed all lab and imaging with the patient.  The patient's abdominal exam has improved.  I explained that the patient should return to the emergency department should the pain recur or for any new symptoms (fever, blood in emesis/stool).  I also advised the patient to follow up with their PMD for further evaluation.  My clinical suspicion for serious intra-abdominal pathology is low, and the patient can be safely discharged  home for outpatient follow up.         MEDICAL DECISION MAKING  Results were reviewed/discussed with the patient and they were also made aware of online access. Pt also made aware that some labs, such as cultures, will not be resulted during ER visit and follow up with PMD is necessary.     MDM  Number of Diagnoses or Management Options     Amount and/or Complexity of Data Reviewed  Clinical lab tests: reviewed and ordered (WBC-11.16  Hemoglobin-15.4)  Tests in the radiology section of CPT®: reviewed and ordered (CXR-negative acute)  Tests in the medicine section of CPT®: reviewed and ordered (See EKG procedure note)  Decide to obtain previous medical records or to obtain history from someone other than the patient: yes  Independent visualization of images, tracings, or specimens: yes           DIAGNOSIS  Final diagnoses:   Epigastric pain   Gastroparesis       DISPOSITION  discharged    Latest Documented Vital Signs:  As of 11:37 PM  BP- (!) 139/114 HR- 94 Temp- 97.1 °F (36.2 °C) (Tympanic) O2 sat- 96%    --  Documentation assistance provided by lianne Ballesteros for MD Jose.  Information recorded by the scribe was done at my direction and has been verified and validated by me.     Adalgisa Ballesteros  01/25/19 2188       Cristian Escalera MD  01/25/19 5995

## 2019-03-15 ENCOUNTER — OFFICE VISIT (OUTPATIENT)
Dept: GASTROENTEROLOGY | Facility: CLINIC | Age: 59
End: 2019-03-15

## 2019-03-15 VITALS
SYSTOLIC BLOOD PRESSURE: 132 MMHG | BODY MASS INDEX: 42.82 KG/M2 | HEIGHT: 67 IN | TEMPERATURE: 97.8 F | WEIGHT: 272.8 LBS | DIASTOLIC BLOOD PRESSURE: 80 MMHG

## 2019-03-15 DIAGNOSIS — R10.84 GENERALIZED ABDOMINAL PAIN: ICD-10-CM

## 2019-03-15 DIAGNOSIS — R11.2 INTRACTABLE VOMITING WITH NAUSEA, UNSPECIFIED VOMITING TYPE: ICD-10-CM

## 2019-03-15 DIAGNOSIS — K31.84 GASTROPARESIS: ICD-10-CM

## 2019-03-15 DIAGNOSIS — K21.9 GASTROESOPHAGEAL REFLUX DISEASE, ESOPHAGITIS PRESENCE NOT SPECIFIED: ICD-10-CM

## 2019-03-15 DIAGNOSIS — R19.7 DIARRHEA OF PRESUMED INFECTIOUS ORIGIN: Primary | ICD-10-CM

## 2019-03-15 LAB
ALBUMIN SERPL-MCNC: 4.3 G/DL (ref 3.5–5.2)
ALBUMIN/GLOB SERPL: 1.4 G/DL
ALP SERPL-CCNC: 135 U/L (ref 39–117)
ALT SERPL-CCNC: 11 U/L (ref 1–33)
AST SERPL-CCNC: 14 U/L (ref 1–32)
BASOPHILS # BLD AUTO: 0.08 10*3/MM3 (ref 0–0.2)
BASOPHILS NFR BLD AUTO: 0.7 % (ref 0–1.5)
BILIRUB SERPL-MCNC: 0.2 MG/DL (ref 0.1–1.2)
BUN SERPL-MCNC: 9 MG/DL (ref 6–20)
BUN/CREAT SERPL: 9.6 (ref 7–25)
CALCIUM SERPL-MCNC: 9.7 MG/DL (ref 8.6–10.5)
CHLORIDE SERPL-SCNC: 102 MMOL/L (ref 98–107)
CO2 SERPL-SCNC: 25.2 MMOL/L (ref 22–29)
CREAT SERPL-MCNC: 0.94 MG/DL (ref 0.57–1)
EOSINOPHIL # BLD AUTO: 0.52 10*3/MM3 (ref 0–0.4)
EOSINOPHIL NFR BLD AUTO: 4.8 % (ref 0.3–6.2)
ERYTHROCYTE [DISTWIDTH] IN BLOOD BY AUTOMATED COUNT: 13.5 % (ref 12.3–15.4)
GLOBULIN SER CALC-MCNC: 3 GM/DL
GLUCOSE SERPL-MCNC: 113 MG/DL (ref 65–99)
HCT VFR BLD AUTO: 42.2 % (ref 34–46.6)
HGB BLD-MCNC: 14 G/DL (ref 12–15.9)
IMM GRANULOCYTES # BLD AUTO: 0.03 10*3/MM3 (ref 0–0.05)
IMM GRANULOCYTES NFR BLD AUTO: 0.3 % (ref 0–0.5)
LYMPHOCYTES # BLD AUTO: 5.23 10*3/MM3 (ref 0.7–3.1)
LYMPHOCYTES NFR BLD AUTO: 48.3 % (ref 19.6–45.3)
MCH RBC QN AUTO: 31.5 PG (ref 26.6–33)
MCHC RBC AUTO-ENTMCNC: 33.2 G/DL (ref 31.5–35.7)
MCV RBC AUTO: 94.8 FL (ref 79–97)
MONOCYTES # BLD AUTO: 0.52 10*3/MM3 (ref 0.1–0.9)
MONOCYTES NFR BLD AUTO: 4.8 % (ref 5–12)
NEUTROPHILS # BLD AUTO: 4.44 10*3/MM3 (ref 1.4–7)
NEUTROPHILS NFR BLD AUTO: 41.1 % (ref 42.7–76)
PLATELET # BLD AUTO: 201 10*3/MM3 (ref 140–450)
POTASSIUM SERPL-SCNC: 3.6 MMOL/L (ref 3.5–5.2)
PROT SERPL-MCNC: 7.3 G/DL (ref 6–8.5)
RBC # BLD AUTO: 4.45 10*6/MM3 (ref 3.77–5.28)
SODIUM SERPL-SCNC: 142 MMOL/L (ref 136–145)
TSH SERPL DL<=0.005 MIU/L-ACNC: 60.9 MIU/ML (ref 0.27–4.2)
WBC # BLD AUTO: 10.82 10*3/MM3 (ref 3.4–10.8)

## 2019-03-15 PROCEDURE — 99214 OFFICE O/P EST MOD 30 MIN: CPT | Performed by: NURSE PRACTITIONER

## 2019-03-15 NOTE — PROGRESS NOTES
"Chief Complaint   Patient presents with   • Follow-up   • Vomiting     3-4 times per week     HPI    Iris Hyde is a  58 y.o. female here for a follow up visit for nausea, vomiting, abdominal pain, and diarrhea.    This is an established patient, new to me, followed by Dr. Biggs.  She has a history of gastroparesis, GERD, and precancerous polyps.  She was in the emergency room in January for chest pain and vomiting.  She also admitted to marijuana use.    Labs performed while she was in the emergency room with BUN of 12, creatinine 1.23, ALT 27, AST 23, alkaline phosphatase 134, lipase 34, troponin less than 0.010.  White blood cells 11.16, hemoglobin 15.4, platelet count 226.  She also had a chest x-ray which showed no acute findings.  EKG showed normal sinus rhythm..  She was given Zofran and a GI cocktail for nausea.  She had a large bowel movement and she felt better.    On visit today she has multiple GI complaints.  She has episodes with her gastroparesis flaring up several times a year but however she feels worse since mid January of this year.  She is vomiting 3-4 times a week.  She is belching \"eggs.\"  There is daily nausea.  She ran out of Dexilant as she was having issues getting this covered with her insurance.  She put herself on Protonix, states \"40 mg.\"  Taken once daily.  There is generalized abdominal pain that \"moves around.\"  Pain is described as an ache.  She is also experiencing explosive diarrhea, worse with ice cream.  She denies rectal bleeding.  She goes on to tell me she has a history of Giardia treated many years ago.  She was on IV antibiotics for kidney stones within the last 6 months.    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    I reviewed endoscopic evaluation from August 2018 as follows;    EGD impression:    Small hiatal hernia.  Gastritis.   Normal examined duodenum.    Colonoscopy " impression:    Two 6 to 8 mm polyps in the cecum, removed with a hot snare. Resected and retrieved.  Two 7 to 9 mm polyps in the transverse colon, removed with a hot snare. Resected and retrieved.  One 5 mm polyp in the sigmoid colon, removed with a hot snare. Resected and retrieved.  One 3 mm polyp in the rectum, removed with a cold biopsy forceps. Resected and retrieved.  Non-bleeding internal hemorrhoids.  Diverticulosis in the sigmoid colon.    Pathology with adenomatous polyp.  Follow-up colonoscopy in 3 years was advised.    Past Medical History:   Diagnosis Date   • Alkaline phosphatase elevation    • Alopecia    • Anxiety    • Arthritis    • Asthmatic bronchitis    • Bladder spasms    • Cholelithiasis 1989    Removed 1989   • Chronic low back pain    • Colitis    • Colon polyp 4 yrs   • Depression    • Diverticulitis of colon not sure   • Eczema    • Esophageal reflux    • Fibromyalgia    • Headache    • Hernia not sure   • High risk medication use    • Hypertension not sure   • Lactose intolerance 5 yrs   • Migraine headache    • Neoplasm of uncertain behavior of breast    • Skin cancer    • Sleep apnea with use of continuous positive airway pressure (CPAP)    • Thyroid disorder    • Urinary frequency    • Urinary pain        Past Surgical History:   Procedure Laterality Date   • ABDOMINAL SURGERY  1980    Appendectomy/exp lap   • APPENDECTOMY  1980    DR. MATIAS   • CHOLECYSTECTOMY  1989    DR. IGOR FUNES   • COLONOSCOPY  09/20/2013    Two 8-9mm polyps in the rectum and in the sigmoid colon, 5mm polyp in the transverse colon, two 2-3mm polyps in the sigmoid colon, NBIH.  PATH: Tubular adenom, hyperplastic changes.    • COLONOSCOPY N/A 8/21/2018    diverticulosis, NBIH, mixed TA/hyperplastic polyps   • CRANIOTOMY  1996   • CYSTOSCOPY W/ URETERAL STENT PLACEMENT Right 12/6/2018    Procedure: CYSTO  RIGHT STENT RIGHT RETROGRADE;  Surgeon: Evin Glover MD;  Location: [facility];  Service:  Urology   • ENDOSCOPY  08/29/2013    LA Grade A reflux esophagitis, HH, erosive gastritis, A single small papule with no stigmata of recent bleeding was found.  PATH:Benign.    • ENDOSCOPY N/A 8/21/2018    small hiatal hernia, gastritis, normal examined duodenum   • HYSTERECTOMY  2000   • TUBAL ABDOMINAL LIGATION  1982   • UPPER GASTROINTESTINAL ENDOSCOPY  8/21/2018       Scheduled Meds:  Outpatient Encounter Medications as of 3/15/2019   Medication Sig Dispense Refill   • cyclobenzaprine (FLEXERIL) 10 MG tablet TAKE 1 TABLET BY MOUTH THREE TIMES DAILY 90 tablet 3   • DEXILANT 60 MG capsule Take 60 mg by mouth Daily.  5   • ibuprofen (ADVIL,MOTRIN) 200 MG tablet Take 200 mg by mouth Every 6 (Six) Hours As Needed for Mild Pain .     • levothyroxine (SYNTHROID, LEVOTHROID) 200 MCG tablet TAKE 1 TABLET BY MOUTH DAILY 90 tablet 0   • losartan-hydrochlorothiazide (HYZAAR) 50-12.5 MG per tablet TAKE 1 TABLET BY MOUTH DAILY 30 tablet 0   • ondansetron ODT (ZOFRAN-ODT) 4 MG disintegrating tablet Take 1 tablet by mouth Every 6 (Six) Hours As Needed for Nausea or Vomiting. 15 tablet 0   • promethazine (PHENERGAN) 25 MG tablet Take 1 tablet by mouth Every 4 (Four) Hours As Needed for Nausea. 100 tablet 2   • varenicline (CHANTIX CONTINUING MONTH FLO) 1 MG tablet Take 1 tablet by mouth 2 (Two) Times a Day. 60 tablet 4   • venlafaxine XR (EFFEXOR-XR) 75 MG 24 hr capsule TAKE 1 CAPSULE BY MOUTH EVERY DAY WITH FOOD 30 capsule 0   • VENTOLIN  (90 Base) MCG/ACT inhaler INHALE 2 PUFFS BY MOUTH EVERY 4 HOURS AS NEEDED FOR WHEEZING 18 g 0     No facility-administered encounter medications on file as of 3/15/2019.        Continuous Infusions:  No current facility-administered medications for this visit.     PRN Meds:.    Allergies   Allergen Reactions   • Butorphanol Mental Status Change   • Butorphanol Tartrate Other (See Comments)     PSYCHOTIC   • Hydrocodone-Acetaminophen Itching     norco    • Cymbalta [Duloxetine Hcl]  Anxiety   • Hydrocodone-Acetaminophen Rash       Social History     Socioeconomic History   • Marital status:      Spouse name: Not on file   • Number of children: Not on file   • Years of education: Not on file   • Highest education level: Not on file   Social Needs   • Financial resource strain: Not on file   • Food insecurity - worry: Not on file   • Food insecurity - inability: Not on file   • Transportation needs - medical: Not on file   • Transportation needs - non-medical: Not on file   Occupational History   • Not on file   Tobacco Use   • Smoking status: Current Every Day Smoker     Packs/day: 0.25     Years: 43.00     Pack years: 10.75   • Smokeless tobacco: Never Used   • Tobacco comment: Began smoking age 14.  Smoked 1 ppd for 43 pack year history.   Substance and Sexual Activity   • Alcohol use: Yes     Comment: Social 2-3x/yr   • Drug use: Yes     Frequency: 3.0 times per week     Types: Marijuana     Comment: Daily for pain management   • Sexual activity: No   Other Topics Concern   • Not on file   Social History Narrative   • Not on file       Family History   Problem Relation Age of Onset   • Heart disease Other    • Diabetes Other    • Alcohol abuse Other    • Anxiety disorder Other    • Bipolar disorder Other    • Cancer Other    • Depression Other    • Lung disease Other    • Kidney disease Other    • Rheum arthritis Other    • Thyroid disease Other        Review of Systems   Constitutional: Negative for activity change, appetite change, fatigue, fever and unexpected weight change.   HENT: Negative for trouble swallowing.    Respiratory: Negative for apnea, cough, choking, chest tightness, shortness of breath and wheezing.    Cardiovascular: Negative for chest pain, palpitations and leg swelling.   Gastrointestinal: Positive for abdominal pain, diarrhea, nausea and vomiting. Negative for abdominal distention, anal bleeding, blood in stool, constipation and rectal pain.       Vitals:     03/15/19 1002   BP: 132/80   Temp: 97.8 °F (36.6 °C)       Physical Exam   Constitutional: She is oriented to person, place, and time. She appears well-developed and well-nourished.   Eyes: Pupils are equal, round, and reactive to light.   Cardiovascular: Normal rate, regular rhythm and normal heart sounds.   Pulmonary/Chest: Effort normal and breath sounds normal. No respiratory distress. She has no wheezes.   Abdominal: Soft. Bowel sounds are normal. She exhibits no distension and no mass. There is no tenderness. There is no guarding. No hernia.   Musculoskeletal: Normal range of motion.   Neurological: She is alert and oriented to person, place, and time.   Skin: Skin is warm and dry. Capillary refill takes less than 2 seconds.   Psychiatric: She has a normal mood and affect. Her behavior is normal.       No images are attached to the encounter.    Iris was seen today for follow-up and vomiting.    Diagnoses and all orders for this visit:    Diarrhea of presumed infectious origin  -     Comprehensive Metabolic Panel  -     TSH  -     CBC & Differential  -     Clostridium Difficile Toxin, PCR - Stool, Per Rectum  -     Gastrointestinal Panel, PCR - Stool, Per Rectum  -     Ova & Parasite Examination - Stool, Per Rectum  -     CT Abdomen Pelvis With Contrast; Future    Generalized abdominal pain  -     Comprehensive Metabolic Panel  -     TSH  -     CBC & Differential  -     Clostridium Difficile Toxin, PCR - Stool, Per Rectum  -     Gastrointestinal Panel, PCR - Stool, Per Rectum  -     Ova & Parasite Examination - Stool, Per Rectum  -     CT Abdomen Pelvis With Contrast; Future    Intractable vomiting with nausea, unspecified vomiting type    Gastroesophageal reflux disease, esophagitis presence not specified    Gastroparesis      Impression:    This is a 58-year-old female seen today for multiple GI complaints.  She has a history of gastroparesis and GERD.  She also reports diarrhea and generalized abdominal  pain.  She does have a history (self-reported) of Giardia.  She was also on antibiotics in the past 6 months.  At this point we will proceed with stool studies to rule out infectious pathogens.  We will obtain labs today to rule out anemia, assess leukocytosis, check kidney, thyroid and liver function.  We will obtain CT of abdomen and pelvis for further assessment of patient's generalized abdominal pain.  I am going to put her back on Dexilant, I provided her with ample samples.  Follow-up 6 weeks.  Further recommendations will be based on the aforementioned workup.

## 2019-03-19 ENCOUNTER — TELEPHONE (OUTPATIENT)
Dept: GASTROENTEROLOGY | Facility: CLINIC | Age: 59
End: 2019-03-19

## 2019-03-19 NOTE — TELEPHONE ENCOUNTER
Called pt and advised per Desiree FRANZ that her tsh is extremely elevated at 60.  Advised she needs to f/u with her pcp or endocrinologist about this immediately.      Wbc's are 10.8 down frm 11.16 a month ago.  Hgb is normal.  Normal kidney function.  She does have elevated alk phos.      Pt verb understanding and will call pcp when she hangs up.

## 2019-03-19 NOTE — TELEPHONE ENCOUNTER
----- Message from YAYA Machado sent at 3/19/2019  2:43 PM EDT -----  Please notify patient that TSH (thyroid function) is extremely elevated at 60.  She needs to follow-up with her primary care provider or endocrinologist about this immediately.    White blood cells are 10.8 down from 11.16 a month ago.  Hemoglobin is normal.  Normal kidney function.  She does have elevated alkaline phosphatase.    I tried to call her but her mobile number was not taking phone calls.    Can you keep trying to reach her please.

## 2019-03-19 NOTE — PROGRESS NOTES
Please notify patient that TSH (thyroid function) is extremely elevated at 60.  She needs to follow-up with her primary care provider or endocrinologist about this immediately.    White blood cells are 10.8 down from 11.16 a month ago.  Hemoglobin is normal.  Normal kidney function.  She does have elevated alkaline phosphatase.    I tried to call her but her mobile number was not taking phone calls.    Can you keep trying to reach her please.

## 2019-03-21 ENCOUNTER — OFFICE VISIT (OUTPATIENT)
Dept: FAMILY MEDICINE CLINIC | Facility: CLINIC | Age: 59
End: 2019-03-21

## 2019-03-21 VITALS
DIASTOLIC BLOOD PRESSURE: 100 MMHG | HEART RATE: 69 BPM | WEIGHT: 273.6 LBS | RESPIRATION RATE: 16 BRPM | OXYGEN SATURATION: 98 % | BODY MASS INDEX: 42.94 KG/M2 | SYSTOLIC BLOOD PRESSURE: 170 MMHG | HEIGHT: 67 IN | TEMPERATURE: 97.8 F

## 2019-03-21 DIAGNOSIS — F41.8 MIXED ANXIETY DEPRESSIVE DISORDER: ICD-10-CM

## 2019-03-21 DIAGNOSIS — E03.9 ACQUIRED HYPOTHYROIDISM: Primary | ICD-10-CM

## 2019-03-21 DIAGNOSIS — I10 ESSENTIAL HYPERTENSION: ICD-10-CM

## 2019-03-21 DIAGNOSIS — F17.210 CIGARETTE SMOKER: ICD-10-CM

## 2019-03-21 DIAGNOSIS — E66.01 OBESITY, MORBID, BMI 40.0-49.9 (HCC): ICD-10-CM

## 2019-03-21 PROBLEM — A41.9 SEPSIS, UNSPECIFIED ORGANISM: Status: RESOLVED | Noted: 2018-12-06 | Resolved: 2019-03-21

## 2019-03-21 PROCEDURE — 99214 OFFICE O/P EST MOD 30 MIN: CPT | Performed by: FAMILY MEDICINE

## 2019-03-21 RX ORDER — LISINOPRIL AND HYDROCHLOROTHIAZIDE 20; 12.5 MG/1; MG/1
1 TABLET ORAL DAILY
Qty: 30 TABLET | Refills: 5 | Status: SHIPPED | OUTPATIENT
Start: 2019-03-21 | End: 2019-08-08 | Stop reason: SINTOL

## 2019-03-21 NOTE — PROGRESS NOTES
HPI  Iris Hyde is a 58 y.o. female who is here for follow up of hypothyroidism.  Was sent over by gastroenterologist.  Patient admits has not been taking her thyroid replacement therapy on a consistent basis.  Misses many doses.  Also has not been taking blood pressure medication because of recall and other issues.  Patient lost her job 3 weeks ago and has been under significant stress.  Also reviewed other lab work which showed normal kidney function.  Apparently had kidney insufficiency related to hydronephrosis from obstructing kidney stone which was removed by urologist.      Review of Systems   Psychiatric/Behavioral: The patient is nervous/anxious.    All other systems reviewed and are negative.        Past Medical History:   Diagnosis Date   • Alkaline phosphatase elevation    • Alopecia    • Anxiety    • Arthritis    • Asthmatic bronchitis    • Bladder spasms    • Cholelithiasis 1989    Removed 1989   • Chronic low back pain    • Colitis    • Colon polyp 4 yrs   • Depression    • Diverticulitis of colon not sure   • Eczema    • Esophageal reflux    • Fibromyalgia    • Headache    • Hernia not sure   • High risk medication use    • Hypertension not sure   • Lactose intolerance 5 yrs   • Migraine headache    • Neoplasm of uncertain behavior of breast    • Skin cancer    • Sleep apnea with use of continuous positive airway pressure (CPAP)    • Thyroid disorder    • Urinary frequency    • Urinary pain        Past Surgical History:   Procedure Laterality Date   • ABDOMINAL SURGERY  1980    Appendectomy/exp lap   • APPENDECTOMY  1980    DR. MATIAS   • CHOLECYSTECTOMY  1989    DR. IGOR FUNES   • COLONOSCOPY  09/20/2013    Two 8-9mm polyps in the rectum and in the sigmoid colon, 5mm polyp in the transverse colon, two 2-3mm polyps in the sigmoid colon, NBIH.  PATH: Tubular adenom, hyperplastic changes.    • COLONOSCOPY N/A 8/21/2018    diverticulosis, NBIH, mixed TA/hyperplastic polyps   • CRANIOTOMY  1996   •  CYSTOSCOPY W/ URETERAL STENT PLACEMENT Right 12/6/2018    Procedure: CYSTO  RIGHT STENT RIGHT RETROGRADE;  Surgeon: Evin Glover MD;  Location: [facility];  Service: Urology   • ENDOSCOPY  08/29/2013    LA Grade A reflux esophagitis, HH, erosive gastritis, A single small papule with no stigmata of recent bleeding was found.  PATH:Benign.    • ENDOSCOPY N/A 8/21/2018    small hiatal hernia, gastritis, normal examined duodenum   • HYSTERECTOMY  2000   • TUBAL ABDOMINAL LIGATION  1982   • UPPER GASTROINTESTINAL ENDOSCOPY  8/21/2018       Family History   Problem Relation Age of Onset   • Heart disease Other    • Diabetes Other    • Alcohol abuse Other    • Anxiety disorder Other    • Bipolar disorder Other    • Cancer Other    • Depression Other    • Lung disease Other    • Kidney disease Other    • Rheum arthritis Other    • Thyroid disease Other        Social History     Socioeconomic History   • Marital status:      Spouse name: Not on file   • Number of children: Not on file   • Years of education: Not on file   • Highest education level: Not on file   Tobacco Use   • Smoking status: Current Every Day Smoker     Packs/day: 0.25     Years: 43.00     Pack years: 10.75   • Smokeless tobacco: Never Used   • Tobacco comment: Began smoking age 14.  Smoked 1 ppd for 43 pack year history.   Substance and Sexual Activity   • Alcohol use: Yes     Comment: Social 2-3x/yr   • Drug use: Yes     Frequency: 3.0 times per week     Types: Marijuana     Comment: Daily for pain management   • Sexual activity: No         Physical Exam   Constitutional: She is oriented to person, place, and time. She appears well-developed and well-nourished. No distress.   HENT:   Head: Normocephalic.   Nose: Nose normal.   Mouth/Throat: Oropharynx is clear and moist.   Eyes: EOM are normal. Pupils are equal, round, and reactive to light.   Neck: Normal range of motion. Neck supple.   Cardiovascular: Normal rate and regular rhythm.    Pulmonary/Chest: Effort normal. No respiratory distress.   Musculoskeletal: She exhibits no edema or deformity.   Neurological: She is alert and oriented to person, place, and time. Coordination normal.   Skin: Skin is warm and dry. No rash noted.   Psychiatric: She has a normal mood and affect. Her behavior is normal. Judgment and thought content normal.   Nursing note and vitals reviewed.        Assessment/Plan    Iris was seen today for gi problem, nicotine dependence, hypothyroidism and hypertension.    Diagnoses and all orders for this visit:    Acquired hypothyroidism    Essential hypertension  -     lisinopril-hydrochlorothiazide (PRINZIDE,ZESTORETIC) 20-12.5 MG per tablet; Take 1 tablet by mouth Daily.    Obesity, morbid, BMI 40.0-49.9 (CMS/[facility])    Mixed anxiety depressive disorder    Cigarette smoker      Patient referred by specialist because of markedly elevated TSH level.  Patient admits has not been taking thyroid supplement as directed missing many doses.  Also has been off blood pressure medicine because of recall and other issues.  Has been under significant stress related to losing her job 3 weeks ago.  Is not aware of having any issues with lisinopril in the past, specifically unaware of cough etc.  Will change to above-noted blood pressure medication and repeat blood pressure in 1 month.  Again emphasized importance of taking medications as directed including thyroid supplement on an empty stomach every morning.  Will repeat blood pressure and thyroid panel in 1 month.  Strongly encourage discontinuing cigarette use.    This note includes information entered using a voice recognition dictation system.  Though reviewed, some nonsensible errors may remain.

## 2019-03-22 ENCOUNTER — APPOINTMENT (OUTPATIENT)
Dept: CT IMAGING | Facility: HOSPITAL | Age: 59
End: 2019-03-22

## 2019-03-25 ENCOUNTER — HOSPITAL ENCOUNTER (OUTPATIENT)
Dept: CT IMAGING | Facility: HOSPITAL | Age: 59
Discharge: HOME | End: 2019-03-25
Admitting: NURSE PRACTITIONER

## 2019-03-25 DIAGNOSIS — R10.84 GENERALIZED ABDOMINAL PAIN: ICD-10-CM

## 2019-03-25 DIAGNOSIS — R19.7 DIARRHEA OF PRESUMED INFECTIOUS ORIGIN: ICD-10-CM

## 2019-03-25 LAB — CREAT BLDA-MCNC: 1.1 MG/DL (ref 0.6–1.3)

## 2019-03-25 PROCEDURE — 74177 CT ABD & PELVIS W/CONTRAST: CPT

## 2019-03-25 PROCEDURE — 82565 ASSAY OF CREATININE: CPT

## 2019-03-25 PROCEDURE — 25010000002 IOPAMIDOL 61 % SOLUTION: Performed by: NURSE PRACTITIONER

## 2019-03-25 RX ADMIN — IOPAMIDOL 85 ML: 612 INJECTION, SOLUTION INTRAVENOUS at 08:55

## 2019-03-26 NOTE — PROGRESS NOTES
Please notify patient that CT of abdomen and pelvis shows bilateral nonobstructing kidney stones.  No evidence of inflammation in her colon.

## 2019-03-28 ENCOUNTER — TELEPHONE (OUTPATIENT)
Dept: GASTROENTEROLOGY | Facility: CLINIC | Age: 59
End: 2019-03-28

## 2019-03-28 NOTE — TELEPHONE ENCOUNTER
Called pt and advised per Desiree that the ct scan of the abd and pelvis shows bilateral nonobstructing kidney stones and no evidence of inflammation in her colon.  Pt verb understanding.

## 2019-04-12 DIAGNOSIS — E03.9 ACQUIRED HYPOTHYROIDISM: ICD-10-CM

## 2019-04-12 RX ORDER — CYCLOBENZAPRINE HCL 10 MG
TABLET ORAL
Qty: 90 TABLET | Refills: 0 | Status: SHIPPED | OUTPATIENT
Start: 2019-04-12 | End: 2019-06-10 | Stop reason: SDUPTHER

## 2019-04-13 RX ORDER — LEVOTHYROXINE SODIUM 0.2 MG/1
200 TABLET ORAL DAILY
Qty: 90 TABLET | Refills: 3 | Status: SHIPPED | OUTPATIENT
Start: 2019-04-13 | End: 2020-06-29

## 2019-04-26 ENCOUNTER — TELEPHONE (OUTPATIENT)
Dept: GASTROENTEROLOGY | Facility: CLINIC | Age: 59
End: 2019-04-26

## 2019-04-26 ENCOUNTER — OFFICE VISIT (OUTPATIENT)
Dept: GASTROENTEROLOGY | Facility: CLINIC | Age: 59
End: 2019-04-26

## 2019-04-26 VITALS
DIASTOLIC BLOOD PRESSURE: 78 MMHG | HEIGHT: 67 IN | TEMPERATURE: 98.4 F | BODY MASS INDEX: 42.35 KG/M2 | SYSTOLIC BLOOD PRESSURE: 114 MMHG | WEIGHT: 269.8 LBS

## 2019-04-26 DIAGNOSIS — K31.84 GASTROPARESIS: ICD-10-CM

## 2019-04-26 DIAGNOSIS — R74.8 ELEVATED ALKALINE PHOSPHATASE LEVEL: Primary | ICD-10-CM

## 2019-04-26 DIAGNOSIS — K21.9 GASTROESOPHAGEAL REFLUX DISEASE, ESOPHAGITIS PRESENCE NOT SPECIFIED: ICD-10-CM

## 2019-04-26 LAB
ALBUMIN SERPL-MCNC: 4.2 G/DL (ref 3.5–5.2)
ALBUMIN/GLOB SERPL: 1.4 G/DL
ALP SERPL-CCNC: 130 U/L (ref 39–117)
ALT SERPL-CCNC: 17 U/L (ref 1–33)
AST SERPL-CCNC: 18 U/L (ref 1–32)
BILIRUB SERPL-MCNC: 0.2 MG/DL (ref 0.2–1.2)
BUN SERPL-MCNC: 12 MG/DL (ref 6–20)
BUN/CREAT SERPL: 15 (ref 7–25)
CALCIUM SERPL-MCNC: 9.5 MG/DL (ref 8.6–10.5)
CHLORIDE SERPL-SCNC: 103 MMOL/L (ref 98–107)
CO2 SERPL-SCNC: 23.7 MMOL/L (ref 22–29)
CREAT SERPL-MCNC: 0.8 MG/DL (ref 0.57–1)
GLOBULIN SER CALC-MCNC: 3 GM/DL
GLUCOSE SERPL-MCNC: 102 MG/DL (ref 65–99)
POTASSIUM SERPL-SCNC: 3.8 MMOL/L (ref 3.5–5.2)
PROT SERPL-MCNC: 7.2 G/DL (ref 6–8.5)
SODIUM SERPL-SCNC: 140 MMOL/L (ref 136–145)
TSH SERPL DL<=0.005 MIU/L-ACNC: 0.14 MIU/ML (ref 0.27–4.2)

## 2019-04-26 PROCEDURE — 99214 OFFICE O/P EST MOD 30 MIN: CPT | Performed by: NURSE PRACTITIONER

## 2019-04-26 RX ORDER — DEXLANSOPRAZOLE 60 MG/1
60 CAPSULE, DELAYED RELEASE ORAL DAILY
Qty: 90 CAPSULE | Refills: 4 | Status: SHIPPED | OUTPATIENT
Start: 2019-04-26 | End: 2019-08-08 | Stop reason: SDUPTHER

## 2019-04-26 NOTE — TELEPHONE ENCOUNTER
[employer] requisition, copy of insurance card and demographic info faxed to .  Confirmation received.    Kit, with instructions, to pt.  Verb understanding.

## 2019-04-27 LAB — MITOCHONDRIA M2 IGG SER-ACNC: <20 UNITS (ref 0–20)

## 2019-04-29 LAB
ALP BONE CFR SERPL: 48 % (ref 14–68)
ALP INTEST CFR SERPL: 13 % (ref 0–18)
ALP LIVER CFR SERPL: 38 % (ref 18–85)

## 2019-04-30 ENCOUNTER — TELEPHONE (OUTPATIENT)
Dept: GASTROENTEROLOGY | Facility: CLINIC | Age: 59
End: 2019-04-30

## 2019-04-30 NOTE — PROGRESS NOTES
Notify patient that lab work consistent with bone origin of elevated alkaline phosphatase. No evidence of autoimmune issues. Normal kidney function.  TSH now 0.138.

## 2019-04-30 NOTE — TELEPHONE ENCOUNTER
----- Message from YAYA Machado sent at 4/30/2019 12:39 PM EDT -----  Notify patient that lab work consistent with bone origin of elevated alkaline phosphatase. No evidence of autoimmune issues. Normal kidney function.  TSH now 0.138.

## 2019-06-11 RX ORDER — CYCLOBENZAPRINE HCL 10 MG
TABLET ORAL
Qty: 90 TABLET | Refills: 0 | Status: SHIPPED | OUTPATIENT
Start: 2019-06-11 | End: 2019-08-08 | Stop reason: SDUPTHER

## 2019-06-11 RX ORDER — PROMETHAZINE HYDROCHLORIDE 25 MG/1
TABLET ORAL
Qty: 100 TABLET | Refills: 0 | Status: SHIPPED | OUTPATIENT
Start: 2019-06-11 | End: 2019-08-08 | Stop reason: SDUPTHER

## 2019-08-02 RX ORDER — PROMETHAZINE HYDROCHLORIDE 25 MG/1
TABLET ORAL
Qty: 100 TABLET | Refills: 0 | OUTPATIENT
Start: 2019-08-02

## 2019-08-02 RX ORDER — CYCLOBENZAPRINE HCL 10 MG
TABLET ORAL
Qty: 90 TABLET | Refills: 0 | OUTPATIENT
Start: 2019-08-02

## 2019-08-02 RX ORDER — VENLAFAXINE HYDROCHLORIDE 75 MG/1
CAPSULE, EXTENDED RELEASE ORAL
Qty: 90 CAPSULE | Refills: 0 | OUTPATIENT
Start: 2019-08-02

## 2019-08-07 ENCOUNTER — PRIOR AUTHORIZATION (OUTPATIENT)
Dept: GASTROENTEROLOGY | Facility: CLINIC | Age: 59
End: 2019-08-07

## 2019-08-08 ENCOUNTER — OFFICE VISIT (OUTPATIENT)
Dept: FAMILY MEDICINE CLINIC | Facility: CLINIC | Age: 59
End: 2019-08-08

## 2019-08-08 VITALS
SYSTOLIC BLOOD PRESSURE: 130 MMHG | DIASTOLIC BLOOD PRESSURE: 90 MMHG | OXYGEN SATURATION: 98 % | RESPIRATION RATE: 16 BRPM | WEIGHT: 269 LBS | HEART RATE: 50 BPM | BODY MASS INDEX: 42.22 KG/M2 | TEMPERATURE: 97.8 F | HEIGHT: 67 IN

## 2019-08-08 DIAGNOSIS — F17.210 CIGARETTE SMOKER: ICD-10-CM

## 2019-08-08 DIAGNOSIS — E66.01 OBESITY, MORBID, BMI 40.0-49.9 (HCC): ICD-10-CM

## 2019-08-08 DIAGNOSIS — Z86.010 HX OF ADENOMATOUS COLONIC POLYPS: ICD-10-CM

## 2019-08-08 DIAGNOSIS — E03.9 ACQUIRED HYPOTHYROIDISM: ICD-10-CM

## 2019-08-08 DIAGNOSIS — M19.90 ARTHRITIS: ICD-10-CM

## 2019-08-08 DIAGNOSIS — F41.8 MIXED ANXIETY DEPRESSIVE DISORDER: ICD-10-CM

## 2019-08-08 DIAGNOSIS — G47.30 SLEEP APNEA, UNSPECIFIED TYPE: ICD-10-CM

## 2019-08-08 DIAGNOSIS — J45.901 ASTHMATIC BRONCHITIS WITH ACUTE EXACERBATION, UNSPECIFIED ASTHMA SEVERITY, UNSPECIFIED WHETHER PERSISTENT: ICD-10-CM

## 2019-08-08 DIAGNOSIS — M79.7 FIBROMYALGIA: Primary | ICD-10-CM

## 2019-08-08 DIAGNOSIS — G43.909 MIGRAINE WITHOUT STATUS MIGRAINOSUS, NOT INTRACTABLE, UNSPECIFIED MIGRAINE TYPE: ICD-10-CM

## 2019-08-08 DIAGNOSIS — I10 ESSENTIAL HYPERTENSION: ICD-10-CM

## 2019-08-08 PROCEDURE — 99214 OFFICE O/P EST MOD 30 MIN: CPT | Performed by: FAMILY MEDICINE

## 2019-08-08 RX ORDER — PANTOPRAZOLE SODIUM 40 MG/1
40 TABLET, DELAYED RELEASE ORAL DAILY
Qty: 30 TABLET | Refills: 11 | Status: SHIPPED | OUTPATIENT
Start: 2019-08-08 | End: 2021-04-02 | Stop reason: SDUPTHER

## 2019-08-08 RX ORDER — VENLAFAXINE HYDROCHLORIDE 75 MG/1
75 CAPSULE, EXTENDED RELEASE ORAL DAILY
Qty: 30 CAPSULE | Refills: 11 | Status: SHIPPED | OUTPATIENT
Start: 2019-08-08 | End: 2021-04-02 | Stop reason: SDUPTHER

## 2019-08-08 RX ORDER — PROMETHAZINE HYDROCHLORIDE 25 MG/1
25 TABLET ORAL EVERY 6 HOURS PRN
Qty: 100 TABLET | Refills: 5 | Status: SHIPPED | OUTPATIENT
Start: 2019-08-08 | End: 2020-09-22 | Stop reason: SDUPTHER

## 2019-08-08 RX ORDER — CYCLOBENZAPRINE HCL 10 MG
10 TABLET ORAL 3 TIMES DAILY
Qty: 90 TABLET | Refills: 11 | Status: SHIPPED | OUTPATIENT
Start: 2019-08-08 | End: 2020-09-03

## 2019-08-08 NOTE — PROGRESS NOTES
HPI  Iris Hyde is a 59 y.o. female who is here for follow up hypertension and multiple other medical problems.  Recently had 9 mm kidney stone obstructing her kidney.  Apparently scheduled for cystoscopy but had to be canceled.  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  Discontinued lisinopril because of a severe cough.  Reports has been off lisinopril for 1 month but continues with cough.  Continues with gastroparesis followed by gastroenterologist.  Insurance denying coverage of Dexilant and patient would like to go back on Protonix until coverage is approved?  Also needs refill on Phenergan which she uses as needed.  Also Flexeril for severe muscle spasms related to fibromyalgia.  Did not tolerate Lyrica or gabapentin in the past.  All of this was discussed.      Review of Systems   Constitutional: Negative for activity change, appetite change and unexpected weight change.   Respiratory: Positive for cough.    Gastrointestinal: Positive for nausea.   Musculoskeletal: Positive for myalgias.   All other systems reviewed and are negative.        Past Medical History:   Diagnosis Date   • Alkaline phosphatase elevation    • Alopecia    • Anxiety    • Arthritis    • Asthmatic bronchitis    • Bladder spasms    • Cholelithiasis 1989    Removed 1989   • Chronic low back pain    • Colitis    • Colon polyp 4 yrs   • Depression    • Diverticulitis of colon not sure   • Eczema    • Esophageal reflux    • Fibromyalgia    • Headache    • Hernia not sure   • High risk medication use    • Hypertension not sure   • Lactose intolerance 5 yrs   • Migraine headache    • Neoplasm of uncertain behavior of breast    • Skin cancer    • Sleep apnea with use of continuous positive airway pressure (CPAP)    • Thyroid disorder    • Urinary frequency    • Urinary pain        Past Surgical History:   Procedure Laterality Date   • ABDOMINAL SURGERY  1980    Appendectomy/exp lap   • APPENDECTOMY  1980    DR. MATIAS   •  CHOLECYSTECTOMY  1989    DR. IGOR FUNES   • COLONOSCOPY  09/20/2013    Two 8-9mm polyps in the rectum and in the sigmoid colon, 5mm polyp in the transverse colon, two 2-3mm polyps in the sigmoid colon, NBIH.  PATH: Tubular adenom, hyperplastic changes.    • COLONOSCOPY N/A 8/21/2018    diverticulosis, NBIH, mixed TA/hyperplastic polyps   • CRANIOTOMY  1996   • CYSTOSCOPY W/ URETERAL STENT PLACEMENT Right 12/6/2018    Procedure: CYSTO  RIGHT STENT RIGHT RETROGRADE;  Surgeon: Evin Glover MD;  Location: [facility];  Service: Urology   • ENDOSCOPY  08/29/2013    LA Grade A reflux esophagitis, HH, erosive gastritis, A single small papule with no stigmata of recent bleeding was found.  PATH:Benign.    • ENDOSCOPY N/A 8/21/2018    small hiatal hernia, gastritis, normal examined duodenum   • HYSTERECTOMY  2000   • TUBAL ABDOMINAL LIGATION  1982   • UPPER GASTROINTESTINAL ENDOSCOPY  8/21/2018       Family History   Problem Relation Age of Onset   • Heart disease Other    • Diabetes Other    • Alcohol abuse Other    • Anxiety disorder Other    • Bipolar disorder Other    • Cancer Other    • Depression Other    • Lung disease Other    • Kidney disease Other    • Rheum arthritis Other    • Thyroid disease Other        Social History     Socioeconomic History   • Marital status:      Spouse name: Not on file   • Number of children: Not on file   • Years of education: Not on file   • Highest education level: Not on file   Tobacco Use   • Smoking status: Current Every Day Smoker     Packs/day: 0.25     Years: 43.00     Pack years: 10.75   • Smokeless tobacco: Never Used   • Tobacco comment: Began smoking age 14.  Smoked 1 ppd for 43 pack year history.   Substance and Sexual Activity   • Alcohol use: Yes     Comment: Social 2-3x/yr   • Drug use: Yes     Frequency: 3.0 times per week     Types: Marijuana     Comment: Daily for pain management   • Sexual activity: No         Physical Exam   Constitutional: She is  oriented to person, place, and time. She appears well-developed and well-nourished. No distress.   HENT:   Head: Normocephalic and atraumatic.   Nose: Nose normal.   Mouth/Throat: Oropharynx is clear and moist.   Eyes: Conjunctivae and EOM are normal. Pupils are equal, round, and reactive to light.   Neck: Normal range of motion. No thyromegaly present.   Cardiovascular: Normal rate and regular rhythm.   Pulmonary/Chest: Effort normal. No respiratory distress.   Musculoskeletal: She exhibits no edema or deformity.   Lymphadenopathy:     She has no cervical adenopathy.   Neurological: She is alert and oriented to person, place, and time. No sensory deficit.   Skin: Skin is warm and dry.   Psychiatric: She has a normal mood and affect. Her behavior is normal. Judgment and thought content normal.   Nursing note and vitals reviewed.        Assessment/Plan    Iris was seen today for med refill.    Diagnoses and all orders for this visit:    Fibromyalgia  -     cyclobenzaprine (FLEXERIL) 10 MG tablet; Take 1 tablet by mouth 3 (Three) Times a Day.    Mixed anxiety depressive disorder  -     venlafaxine XR (EFFEXOR-XR) 75 MG 24 hr capsule; Take 1 capsule by mouth Daily.    Cigarette smoker    Obesity, morbid, BMI 40.0-49.9 (CMS/HCC)    Acquired hypothyroidism    Essential hypertension    Migraine without status migrainosus, not intractable, unspecified migraine type    Asthmatic bronchitis with acute exacerbation, unspecified asthma severity, unspecified whether persistent    Sleep apnea, unspecified type    Arthritis    Hx of adenomatous colonic polyps    Other orders  -     promethazine (PHENERGAN) 25 MG tablet; Take 1 tablet by mouth Every 6 (Six) Hours As Needed for Nausea or Vomiting.  -     pantoprazole (PROTONIX) 40 MG EC tablet; Take 1 tablet by mouth Daily.      Patient here for routine follow-up of multiple medical problems as noted above.  Continues to work as needed.  Being followed for kidney stone which  caused hydronephrosis.  Also continues with gastroparesis symptoms followed by gastroenterologist.  Overall status seems fairly stable on current regimen.  Discontinued lisinopril because of cough and has been monitoring blood pressure at work and has been in the 130s over 80s.  Blood pressure increased when fibromyalgia flares.  Again strongly recommend discontinuing cigarette use.  Most recent lab work reviewed and unremarkable except for slightly decreased TSH level.  Will plan on routine follow-up in 6 months or as needed.    This note includes information entered using a voice recognition dictation system.  Though reviewed, some nonsensible errors may remain.

## 2019-10-09 ENCOUNTER — OFFICE VISIT (OUTPATIENT)
Dept: ORTHOPEDIC SURGERY | Facility: CLINIC | Age: 59
End: 2019-10-09

## 2019-10-09 VITALS — WEIGHT: 286 LBS | BODY MASS INDEX: 44.89 KG/M2 | HEIGHT: 67 IN | TEMPERATURE: 97.5 F

## 2019-10-09 DIAGNOSIS — M75.102 TEAR OF LEFT ROTATOR CUFF, UNSPECIFIED TEAR EXTENT, UNSPECIFIED WHETHER TRAUMATIC: ICD-10-CM

## 2019-10-09 DIAGNOSIS — R52 PAIN: Primary | ICD-10-CM

## 2019-10-09 PROCEDURE — 99214 OFFICE O/P EST MOD 30 MIN: CPT | Performed by: ORTHOPAEDIC SURGERY

## 2019-10-09 PROCEDURE — 73030 X-RAY EXAM OF SHOULDER: CPT | Performed by: ORTHOPAEDIC SURGERY

## 2019-10-09 NOTE — PROGRESS NOTES
New Shoulder      Patient: Iris Hyde        YOB: 1960    Medical Record Number: [id number]        Chief Complaints:   Left shoulder pain    History of Present Illness: This is a 59-year-old right-hand-dominant female who presents complaint of left shoulder pain is been ongoing for 4 weeks no one particular history of injury change in activity I have seen her several years ago for this.  She was doing some CBD oil that was helping however apparently it does have a small amount of THC and that did show up in a drug strength screen so she is quit that.  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   • ibuprofen (ADVIL,MOTRIN) 200 MG tablet Take 200 mg by mouth Every 6 (Six) Hours As Needed for Mild Pain .   • levothyroxine (SYNTHROID, LEVOTHROID) 200 MCG tablet TAKE 1 TABLET BY MOUTH DAILY   • pantoprazole (PROTONIX) 40 MG EC tablet Take 1 tablet by mouth Daily.   • promethazine (PHENERGAN) 25 MG tablet Take 1 tablet by mouth Every 6 (Six) Hours As Needed for Nausea or Vomiting.   • venlafaxine XR (EFFEXOR-XR) 75 MG 24 hr capsule Take 1 capsule by mouth Daily.   • VENTOLIN  (90 Base) MCG/ACT inhaler INHALE 2 PUFFS BY MOUTH EVERY 4 HOURS AS NEEDED FOR WHEEZING     No current facility-administered medications on file prior to visit.      Current Medications:  Scheduled  Meds:  Continuous Infusions:  No current facility-administered medications for this visit.   PRN Meds:.    Past Medical History:   Diagnosis Date   • Alkaline phosphatase elevation    • Alopecia    • Anxiety    • Arthritis    • Asthmatic bronchitis    • Bladder spasms    • Cholelithiasis 1989    Removed 1989   • Chronic low back pain    • Colitis    • Colon polyp 4 yrs   • Depression    • Diverticulitis of colon not sure   • Eczema    • Esophageal reflux    • Fibromyalgia    • Headache    • Hernia not sure   • High risk medication use    • Hypertension not sure   • Lactose intolerance 5 yrs   • Migraine headache    • Neoplasm of uncertain behavior of breast    • Skin cancer    • Sleep apnea with use of continuous positive airway pressure (CPAP)    • Thyroid disorder    • Urinary frequency    • Urinary pain         Past Surgical History:   Procedure Laterality Date   • ABDOMINAL SURGERY  1980    Appendectomy/exp lap   • APPENDECTOMY  1980    DR. MATIAS   • CHOLECYSTECTOMY  1989    DR. IGOR FUNES   • COLONOSCOPY  09/20/2013    Two 8-9mm polyps in the rectum and in the sigmoid colon, 5mm polyp in the transverse colon, two 2-3mm polyps in the sigmoid colon, NBIH.  PATH: Tubular adenom, hyperplastic changes.    • COLONOSCOPY N/A 8/21/2018    diverticulosis, NBIH, mixed TA/hyperplastic polyps   • CRANIOTOMY  1996   • CYSTOSCOPY W/ URETERAL STENT PLACEMENT Right 12/6/2018    Procedure: CYSTO  RIGHT STENT RIGHT RETROGRADE;  Surgeon: Evin Glover MD;  Location: [facility];  Service: Urology   • ENDOSCOPY  08/29/2013    LA Grade A reflux esophagitis, HH, erosive gastritis, A single small papule with no stigmata of recent bleeding was found.  PATH:Benign.    • ENDOSCOPY N/A 8/21/2018    small hiatal hernia, gastritis, normal examined duodenum   • HYSTERECTOMY  2000   • TUBAL ABDOMINAL LIGATION  1982   • UPPER GASTROINTESTINAL ENDOSCOPY  8/21/2018        Social History     Occupational History   • Not on file  "  Tobacco Use   • Smoking status: Current Every Day Smoker     Packs/day: 0.25     Years: 43.00     Pack years: 10.75   • Smokeless tobacco: Never Used   • Tobacco comment: Began smoking age 14.  Smoked 1 ppd for 43 pack year history.   Substance and Sexual Activity   • Alcohol use: Yes     Comment: Social 2-3x/yr   • Drug use: Yes     Frequency: 3.0 times per week     Types: Marijuana     Comment: Daily for pain management   • Sexual activity: No    Social History     Social History Narrative   • Not on file        Family History   Problem Relation Age of Onset   • Heart disease Other    • Diabetes Other    • Alcohol abuse Other    • Anxiety disorder Other    • Bipolar disorder Other    • Cancer Other    • Depression Other    • Lung disease Other    • Kidney disease Other    • Rheum arthritis Other    • Thyroid disease Other              Review of Systems: 14 point review of systems are remarkable for the multiple pertinent positives listed in the chart by the patient the remainder negative    Review of Systems      Physical Exam: 59 y.o. female  General Appearance:    Alert, cooperative, in no acute distress                 Vitals:    10/09/19 1241   Temp: 97.5 °F (36.4 °C)   TempSrc: Temporal   Weight: 130 kg (286 lb)   Height: 170.2 cm (67\")   PainSc:   7   PainLoc: Shoulder      Patient is alert and read ×3 no acute distress appears her above-listed at height weight and age.  Affect is normal respiratory rate is normal unlabored. Heart rate regular rate rhythm, sclera, dentition and hearing are normal for the purpose of this exam.    Ortho Exam Physical exam of the left shoulder reveals no overlying skin changes no lymphedema no lymphadenopathy.  Patient has active flexion 180 with mild symptoms abduction is similar external rotation is to 50 and internal rotation to the upper lumbar spine with mild symptoms.  Patient has good rotator cuff strength 4+ over 5 with isometric strength testing with pain.  Patient " has a positive impingement and a positive Mejia sign.  Patient has good cervical range of motion which is full and asymptomatic no radicular symptoms.  Patient has a normal elbow exam.  Good distal pulses are presentPatient has pain with overhead activity and a positive Neer sign and a positive empty can sign  They have a positive drop arm any definitive painful arc    Procedures          Radiology:   AP, Scapular Y and Axillary Lateral of the left shoulder were ordered/reviewed to evauate shoulder pain.  I have compared to previous films she does have very mild glenohumeral arthritis with a small osteophyte seen on axillary lateral and some mild acromioclavicular arthritis no significant change  Imaging Results (most recent)     Procedure Component Value Units Date/Time    XR Shoulder 2+ View Left [[id number]] Resulted:  10/09/19 1215     Updated:  10/09/19 1215    Impression:       Ordering physician's impression is located in the Encounter Note dated 10/09/19. X-ray performed in the DR room.          Assessment/Plan: Left shoulder pain I really think more of her symptoms today are due to the cuff versus arthritis think she benefit from an injection if she fails to improve with that we will pursue other means of testing  Cortisone Injection. See procedure note.  Cortisone Injection for DIAGNOSTIC and THERAPUTIC purposes.

## 2019-10-21 ENCOUNTER — TELEPHONE (OUTPATIENT)
Dept: ORTHOPEDIC SURGERY | Facility: CLINIC | Age: 59
End: 2019-10-21

## 2019-10-21 NOTE — TELEPHONE ENCOUNTER
Regarding: Non-Urgent Medical Question  Contact: [phone]  ----- Message from Mychart, Generic sent at 10/20/2019  2:34 AM EDT -----  MY CHART MESSAGE:    My shoulder hurt really bad after I got the cortisone injection but 3 days later, the pain subsided. It's about a 2 now, it was a 9/10 so that's great! Thanks!!!

## 2020-01-07 ENCOUNTER — TELEPHONE (OUTPATIENT)
Dept: ORTHOPEDIC SURGERY | Facility: CLINIC | Age: 60
End: 2020-01-07

## 2020-01-07 DIAGNOSIS — M75.102 TEAR OF LEFT ROTATOR CUFF, UNSPECIFIED TEAR EXTENT, UNSPECIFIED WHETHER TRAUMATIC: Primary | ICD-10-CM

## 2020-01-17 ENCOUNTER — HOSPITAL ENCOUNTER (OUTPATIENT)
Dept: MRI IMAGING | Facility: HOSPITAL | Age: 60
Discharge: HOME | End: 2020-01-17
Admitting: ORTHOPAEDIC SURGERY

## 2020-01-17 DIAGNOSIS — M75.102 TEAR OF LEFT ROTATOR CUFF, UNSPECIFIED TEAR EXTENT, UNSPECIFIED WHETHER TRAUMATIC: ICD-10-CM

## 2020-01-17 PROCEDURE — 73221 MRI JOINT UPR EXTREM W/O DYE: CPT

## 2020-01-21 ENCOUNTER — TELEPHONE (OUTPATIENT)
Dept: ORTHOPEDIC SURGERY | Facility: CLINIC | Age: 60
End: 2020-01-21

## 2020-01-21 ENCOUNTER — PATIENT MESSAGE (OUTPATIENT)
Dept: ORTHOPEDIC SURGERY | Facility: CLINIC | Age: 60
End: 2020-01-21

## 2020-01-21 NOTE — TELEPHONE ENCOUNTER
----- Message from Iris Hyde sent at 1/21/2020 10:01 AM EST -----  Regarding: Test Results Question  Contact: [phone]  I have a question about MRI SHOULDER LEFT resulted on 1/20/20, 12:12.  What does this mean Dr. Iqbal? My shoulder and arm hurts constantly. I'm thankful that I didn't tear my rotator cuff but what can I do to treat this?

## 2020-01-27 PROCEDURE — 99284 EMERGENCY DEPT VISIT MOD MDM: CPT

## 2020-01-27 RX ORDER — SODIUM CHLORIDE 0.9 % (FLUSH) 0.9 %
10 SYRINGE (ML) INJECTION AS NEEDED
Status: DISCONTINUED | OUTPATIENT
Start: 2020-01-27 | End: 2020-01-28 | Stop reason: HOSPADM

## 2020-01-28 ENCOUNTER — HOSPITAL ENCOUNTER (EMERGENCY)
Facility: HOSPITAL | Age: 60
Discharge: HOME | End: 2020-01-28
Attending: EMERGENCY MEDICINE | Admitting: EMERGENCY MEDICINE

## 2020-01-28 ENCOUNTER — APPOINTMENT (OUTPATIENT)
Dept: CT IMAGING | Facility: HOSPITAL | Age: 60
End: 2020-01-28

## 2020-01-28 VITALS
RESPIRATION RATE: 18 BRPM | HEIGHT: 67 IN | SYSTOLIC BLOOD PRESSURE: 180 MMHG | HEART RATE: 79 BPM | DIASTOLIC BLOOD PRESSURE: 105 MMHG | WEIGHT: 273 LBS | OXYGEN SATURATION: 98 % | TEMPERATURE: 97.3 F | BODY MASS INDEX: 42.85 KG/M2

## 2020-01-28 DIAGNOSIS — R31.9 URINARY TRACT INFECTION WITH HEMATURIA, SITE UNSPECIFIED: Primary | ICD-10-CM

## 2020-01-28 DIAGNOSIS — N20.0 NEPHROLITHIASIS: ICD-10-CM

## 2020-01-28 DIAGNOSIS — N39.0 URINARY TRACT INFECTION WITH HEMATURIA, SITE UNSPECIFIED: Primary | ICD-10-CM

## 2020-01-28 LAB
ALBUMIN SERPL-MCNC: 3.9 G/DL (ref 3.5–5.2)
ALBUMIN/GLOB SERPL: 1.5 G/DL
ALP SERPL-CCNC: 138 U/L (ref 39–117)
ALT SERPL W P-5'-P-CCNC: 16 U/L (ref 1–33)
ANION GAP SERPL CALCULATED.3IONS-SCNC: 12.6 MMOL/L (ref 5–15)
AST SERPL-CCNC: 21 U/L (ref 1–32)
BACTERIA UR QL AUTO: ABNORMAL /HPF
BILIRUB SERPL-MCNC: 0.2 MG/DL (ref 0.2–1.2)
BILIRUB UR QL STRIP: NEGATIVE
BUN BLD-MCNC: 18 MG/DL (ref 6–20)
BUN/CREAT SERPL: 21.7 (ref 7–25)
CALCIUM SPEC-SCNC: 9 MG/DL (ref 8.6–10.5)
CHLORIDE SERPL-SCNC: 102 MMOL/L (ref 98–107)
CLARITY UR: ABNORMAL
CO2 SERPL-SCNC: 24.4 MMOL/L (ref 22–29)
COLOR UR: YELLOW
CREAT BLD-MCNC: 0.83 MG/DL (ref 0.57–1)
DEPRECATED RDW RBC AUTO: 43.5 FL (ref 37–54)
EOSINOPHIL # BLD MANUAL: 0.21 10*3/MM3 (ref 0–0.4)
EOSINOPHIL NFR BLD MANUAL: 2 % (ref 0.3–6.2)
ERYTHROCYTE [DISTWIDTH] IN BLOOD BY AUTOMATED COUNT: 12.9 % (ref 12.3–15.4)
GFR SERPL CREATININE-BSD FRML MDRD: 71 ML/MIN/1.73
GLOBULIN UR ELPH-MCNC: 2.6 GM/DL
GLUCOSE BLD-MCNC: 131 MG/DL (ref 65–99)
GLUCOSE UR STRIP-MCNC: NEGATIVE MG/DL
HCT VFR BLD AUTO: 41.4 % (ref 34–46.6)
HGB BLD-MCNC: 14.1 G/DL (ref 12–15.9)
HGB UR QL STRIP.AUTO: ABNORMAL
HOLD SPECIMEN: NORMAL
HOLD SPECIMEN: NORMAL
HYALINE CASTS UR QL AUTO: ABNORMAL /LPF
KETONES UR QL STRIP: ABNORMAL
LEUKOCYTE ESTERASE UR QL STRIP.AUTO: NEGATIVE
LIPASE SERPL-CCNC: 54 U/L (ref 13–60)
LYMPHOCYTES # BLD MANUAL: 4.62 10*3/MM3 (ref 0.7–3.1)
LYMPHOCYTES NFR BLD MANUAL: 3.1 % (ref 5–12)
LYMPHOCYTES NFR BLD MANUAL: 44.9 % (ref 19.6–45.3)
MCH RBC QN AUTO: 31.6 PG (ref 26.6–33)
MCHC RBC AUTO-ENTMCNC: 34.1 G/DL (ref 31.5–35.7)
MCV RBC AUTO: 92.8 FL (ref 79–97)
MONOCYTES # BLD AUTO: 0.32 10*3/MM3 (ref 0.1–0.9)
NEUTROPHILS # BLD AUTO: 5.15 10*3/MM3 (ref 1.7–7)
NEUTROPHILS NFR BLD MANUAL: 50 % (ref 42.7–76)
NITRITE UR QL STRIP: POSITIVE
PH UR STRIP.AUTO: 6.5 [PH] (ref 5–8)
PLAT MORPH BLD: NORMAL
PLATELET # BLD AUTO: 248 10*3/MM3 (ref 140–450)
PMV BLD AUTO: 9.9 FL (ref 6–12)
POTASSIUM BLD-SCNC: 3.4 MMOL/L (ref 3.5–5.2)
PROT SERPL-MCNC: 6.5 G/DL (ref 6–8.5)
PROT UR QL STRIP: ABNORMAL
RBC # BLD AUTO: 4.46 10*6/MM3 (ref 3.77–5.28)
RBC # UR: ABNORMAL /HPF
RBC MORPH BLD: NORMAL
REF LAB TEST METHOD: ABNORMAL
SMUDGE CELLS BLD QL SMEAR: ABNORMAL
SODIUM BLD-SCNC: 139 MMOL/L (ref 136–145)
SP GR UR STRIP: >=1.03 (ref 1–1.03)
SQUAMOUS #/AREA URNS HPF: ABNORMAL /HPF
UROBILINOGEN UR QL STRIP: ABNORMAL
WBC NRBC COR # BLD: 10.3 10*3/MM3 (ref 3.4–10.8)
WBC UR QL AUTO: ABNORMAL /HPF
WHOLE BLOOD HOLD SPECIMEN: NORMAL
WHOLE BLOOD HOLD SPECIMEN: NORMAL

## 2020-01-28 PROCEDURE — 74176 CT ABD & PELVIS W/O CONTRAST: CPT

## 2020-01-28 PROCEDURE — 83690 ASSAY OF LIPASE: CPT | Performed by: EMERGENCY MEDICINE

## 2020-01-28 PROCEDURE — 81001 URINALYSIS AUTO W/SCOPE: CPT

## 2020-01-28 PROCEDURE — 85007 BL SMEAR W/DIFF WBC COUNT: CPT | Performed by: EMERGENCY MEDICINE

## 2020-01-28 PROCEDURE — 87086 URINE CULTURE/COLONY COUNT: CPT | Performed by: EMERGENCY MEDICINE

## 2020-01-28 PROCEDURE — 85025 COMPLETE CBC W/AUTO DIFF WBC: CPT | Performed by: EMERGENCY MEDICINE

## 2020-01-28 PROCEDURE — 80053 COMPREHEN METABOLIC PANEL: CPT | Performed by: EMERGENCY MEDICINE

## 2020-01-28 RX ORDER — DICLOFENAC SODIUM 75 MG/1
75 TABLET, DELAYED RELEASE ORAL 2 TIMES DAILY PRN
Qty: 14 TABLET | Refills: 0 | Status: SHIPPED | OUTPATIENT
Start: 2020-01-28 | End: 2020-02-04

## 2020-01-28 RX ORDER — CEPHALEXIN 500 MG/1
500 CAPSULE ORAL 3 TIMES DAILY
Qty: 21 CAPSULE | Refills: 0 | Status: SHIPPED | OUTPATIENT
Start: 2020-01-28 | End: 2020-02-04

## 2020-01-28 RX ORDER — OXYCODONE HYDROCHLORIDE AND ACETAMINOPHEN 5; 325 MG/1; MG/1
1 TABLET ORAL ONCE
Status: COMPLETED | OUTPATIENT
Start: 2020-01-28 | End: 2020-01-28

## 2020-01-28 RX ADMIN — OXYCODONE AND ACETAMINOPHEN 1 TABLET: 5; 325 TABLET ORAL at 03:48

## 2020-01-29 LAB — BACTERIA SPEC AEROBE CULT: NORMAL

## 2020-03-16 DIAGNOSIS — J45.909 ASTHMATIC BRONCHITIS WITHOUT COMPLICATION, UNSPECIFIED ASTHMA SEVERITY, UNSPECIFIED WHETHER PERSISTENT: ICD-10-CM

## 2020-03-16 RX ORDER — ALBUTEROL SULFATE 90 UG/1
2 AEROSOL, METERED RESPIRATORY (INHALATION) EVERY 4 HOURS PRN
Qty: 18 G | Refills: 0 | Status: SHIPPED | OUTPATIENT
Start: 2020-03-16 | End: 2020-04-03

## 2020-03-16 RX ORDER — GUAIFENESIN/DEXTROMETHORPHAN 100-10MG/5
10 SYRUP ORAL 4 TIMES DAILY PRN
Qty: 118 ML | Refills: 2 | Status: SHIPPED | OUTPATIENT
Start: 2020-03-16 | End: 2020-03-17 | Stop reason: ALTCHOICE

## 2020-03-16 RX ORDER — ALBUTEROL SULFATE 90 UG/1
AEROSOL, METERED RESPIRATORY (INHALATION)
Qty: 90 G | OUTPATIENT
Start: 2020-03-16

## 2020-03-17 ENCOUNTER — TELEPHONE (OUTPATIENT)
Dept: FAMILY MEDICINE CLINIC | Facility: CLINIC | Age: 60
End: 2020-03-17

## 2020-03-17 DIAGNOSIS — J40 BRONCHITIS: Primary | ICD-10-CM

## 2020-03-17 RX ORDER — GUAIFENESIN AND CODEINE PHOSPHATE 100; 10 MG/5ML; MG/5ML
SOLUTION ORAL
Qty: 180 ML | Refills: 0 | Status: SHIPPED | OUTPATIENT
Start: 2020-03-17 | End: 2020-03-17 | Stop reason: SDUPTHER

## 2020-03-17 RX ORDER — GUAIFENESIN AND CODEINE PHOSPHATE 100; 10 MG/5ML; MG/5ML
SOLUTION ORAL
Qty: 180 ML | Refills: 0 | Status: SHIPPED | OUTPATIENT
Start: 2020-03-17 | End: 2021-04-02

## 2020-03-17 NOTE — TELEPHONE ENCOUNTER
----- Message from Iris Hyde sent at 3/16/2020 11:19 PM EDT -----  Regarding: RE: Non-Urgent Medical Question  Contact: [phone]  Dr Willis,    I went to the pharmacy to get my meds. They said you called in Robitussin DM, not a cough syrup with codeine. I'm on my 2nd bottle of Robitussin DM, it doesn't help for more than an hour.    ThanksIris    ----- Message -----  From: Adarsh Willis MD  Sent: 3/16/20, 13:09  To: Iris Hyde  Subject: RE: Non-Urgent Medical Question    Will send prescription for albuterol and also cough syrup with codeine.  May need steroid inhaler and antibiotic.  Will evaluate Wednesday at appointment.  Also may need to consider chest x-ray?      ----- Message -----     From: Iris Hyde     Sent: 3/16/2020 12:41 AM EDT       To: Adarsh Willis MD  Subject: Non-Urgent Medical Question    Dr. Willis,    I have developed a nonproductive cough and SOA. My lungs are clear except for exploratory wheezes at times. I had a slight temp last Tuesday of 100.4 but I've been ok since then. I have not been exposed to anyone with corona. The cough reminds me of the cough Lisinopril caused me to have, no issues other than annoying. It does bother me when I get extremely short of air though. 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 I have gone through my Albuterol inhaler and I don't have any refills. Could you call me in a new script? I would greatly appreciate it. Is there anything else I should be doing? Also, what is the best thing to take for the cough? I've taken Mussinex, Robitussin and cough drops but nothing seems to help. When I get to coughing, it seems to scare people that I'm going to make them sick. I work 6p-6a or 10p-6a so just leave me a response here if you don't mind. I will check when I wake up. My pharmacy is [facility] on [address]  phone number [phone]. Thank you!    Sincerely,  Iris Hyde    [phone]  1960

## 2020-03-17 NOTE — TELEPHONE ENCOUNTER
Computer denied prescription for Robitussin with codeine because indicated allergic to hydrocodone?  Informed patient will override denial, assuming has tolerated codeine cough syrup in the past?

## 2020-03-17 NOTE — TELEPHONE ENCOUNTER
----- Message from Iris Hyde sent at 3/16/2020 11:19 PM EDT -----  Regarding: RE: Non-Urgent Medical Question  Contact: [phone]  Dr Willis,    I went to the pharmacy to get my meds. They said you called in Robitussin DM, not a cough syrup with codeine. I'm on my 2nd bottle of Robitussin DM, it doesn't help for more than an hour.    ThanksIris    ----- Message -----  From: Adarsh Willis MD  Sent: 3/16/20, 13:09  To: Iris Hyde  Subject: RE: Non-Urgent Medical Question    Will send prescription for albuterol and also cough syrup with codeine.  May need steroid inhaler and antibiotic.  Will evaluate Wednesday at appointment.  Also may need to consider chest x-ray?      ----- Message -----     From: Iris Hyde     Sent: 3/16/2020 12:41 AM EDT       To: Adarsh Willis MD  Subject: Non-Urgent Medical Question    Dr. Willis,    I have developed a nonproductive cough and SOA. My lungs are clear except for exploratory wheezes at times. I had a slight temp last Tuesday of 100.4 but I've been ok since then. I have not been exposed to anyone with corona. The cough reminds me of the cough Lisinopril caused me to have, no issues other than annoying. It does bother me when I get extremely short of air though. 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 I have gone through my Albuterol inhaler and I don't have any refills. Could you call me in a new script? I would greatly appreciate it. Is there anything else I should be doing? Also, what is the best thing to take for the cough? I've taken Mussinex, Robitussin and cough drops but nothing seems to help. When I get to coughing, it seems to scare people that I'm going to make them sick. I work 6p-6a or 10p-6a so just leave me a response here if you don't mind. I will check when I wake up. My pharmacy is [facility] on [address]  phone number [phone]. Thank you!    Sincerely,  Iris Hyde    [phone]  1960

## 2020-03-18 ENCOUNTER — OFFICE VISIT (OUTPATIENT)
Dept: FAMILY MEDICINE CLINIC | Facility: CLINIC | Age: 60
End: 2020-03-18

## 2020-03-18 VITALS
RESPIRATION RATE: 20 BRPM | BODY MASS INDEX: 43.7 KG/M2 | HEART RATE: 53 BPM | WEIGHT: 278.4 LBS | DIASTOLIC BLOOD PRESSURE: 88 MMHG | OXYGEN SATURATION: 98 % | HEIGHT: 67 IN | SYSTOLIC BLOOD PRESSURE: 132 MMHG | TEMPERATURE: 98.2 F

## 2020-03-18 DIAGNOSIS — Z12.2 ENCOUNTER FOR SCREENING FOR LUNG CANCER: ICD-10-CM

## 2020-03-18 DIAGNOSIS — F17.210 CIGARETTE SMOKER: Primary | ICD-10-CM

## 2020-03-18 DIAGNOSIS — J44.9 CHRONIC OBSTRUCTIVE PULMONARY DISEASE, UNSPECIFIED COPD TYPE (HCC): ICD-10-CM

## 2020-03-18 DIAGNOSIS — J40 BRONCHITIS: ICD-10-CM

## 2020-03-18 PROCEDURE — 99213 OFFICE O/P EST LOW 20 MIN: CPT | Performed by: FAMILY MEDICINE

## 2020-03-18 RX ORDER — AZITHROMYCIN 250 MG/1
TABLET, FILM COATED ORAL
Qty: 6 TABLET | Refills: 0 | Status: SHIPPED | OUTPATIENT
Start: 2020-03-18 | End: 2020-06-09

## 2020-03-18 RX ORDER — CHOLECALCIFEROL (VITAMIN D3) 125 MCG
10 CAPSULE ORAL
COMMUNITY
End: 2021-04-02

## 2020-03-18 NOTE — PROGRESS NOTES
HPI  Iris Hyde is a 59 y.o. female who is here for persistent cough for the last week.  Denies fever chills except at onset greater than 1 week ago that resolved.  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  Does have known COPD and unfortunately continues to smoke up to 1 pack a day especially since the death of her  etc.  Had screening chest CT several years ago and is past due for follow-up.  Followed by pulmonary specialist on an as-needed basis.  Previously able to discontinue cigarettes with Chantix but apparently discontinued in the hospital with kidney stones and would not let her take her home medications?      Review of Systems   Constitutional: Negative for chills and fever.   HENT: Negative for ear pain, postnasal drip, rhinorrhea and sore throat.    Respiratory: Positive for cough, shortness of breath and wheezing.    Cardiovascular: Negative for chest pain.   Musculoskeletal: Positive for myalgias.   Skin: Negative for rash.   Neurological: Positive for headaches.   All other systems reviewed and are negative.        Past Medical History:   Diagnosis Date   • Alkaline phosphatase elevation    • Alopecia    • Anxiety    • Arthritis    • Asthmatic bronchitis    • Bladder spasms    • Cholelithiasis 1989    Removed 1989   • Chronic low back pain    • Colitis    • Colon polyp 4 yrs   • Depression    • Diverticulitis of colon not sure   • Eczema    • Esophageal reflux    • Fibromyalgia    • Headache    • Hernia not sure   • High risk medication use    • Hypertension not sure   • Lactose intolerance 5 yrs   • Migraine headache    • Neoplasm of uncertain behavior of breast    • Skin cancer    • Sleep apnea with use of continuous positive airway pressure (CPAP)    • Thyroid disorder    • Urinary frequency    • Urinary pain        Past Surgical History:   Procedure Laterality Date   • ABDOMINAL SURGERY  1980    Appendectomy/exp lap   • APPENDECTOMY  1980    DR. MATIAS   •  CHOLECYSTECTOMY  1989    DR. IGOR FUNES   • COLONOSCOPY  09/20/2013    Two 8-9mm polyps in the rectum and in the sigmoid colon, 5mm polyp in the transverse colon, two 2-3mm polyps in the sigmoid colon, NBIH.  PATH: Tubular adenom, hyperplastic changes.    • COLONOSCOPY N/A 8/21/2018    diverticulosis, NBIH, mixed TA/hyperplastic polyps   • CRANIOTOMY  1996   • CYSTOSCOPY W/ URETERAL STENT PLACEMENT Right 12/6/2018    Procedure: CYSTO  RIGHT STENT RIGHT RETROGRADE;  Surgeon: Evin Glover MD;  Location: [facility];  Service: Urology   • ENDOSCOPY  08/29/2013    LA Grade A reflux esophagitis, HH, erosive gastritis, A single small papule with no stigmata of recent bleeding was found.  PATH:Benign.    • ENDOSCOPY N/A 8/21/2018    small hiatal hernia, gastritis, normal examined duodenum   • HYSTERECTOMY  2000   • TUBAL ABDOMINAL LIGATION  1982   • UPPER GASTROINTESTINAL ENDOSCOPY  8/21/2018       Family History   Problem Relation Age of Onset   • Heart disease Other    • Diabetes Other    • Alcohol abuse Other    • Anxiety disorder Other    • Bipolar disorder Other    • Cancer Other    • Depression Other    • Lung disease Other    • Kidney disease Other    • Rheum arthritis Other    • Thyroid disease Other        Social History     Socioeconomic History   • Marital status:      Spouse name: Not on file   • Number of children: Not on file   • Years of education: Not on file   • Highest education level: Not on file   Tobacco Use   • Smoking status: Current Every Day Smoker     Packs/day: 0.25     Years: 43.00     Pack years: 10.75   • Smokeless tobacco: Never Used   • Tobacco comment: Began smoking age 14.  Smoked 1 ppd for 43 pack year history.   Substance and Sexual Activity   • Alcohol use: Yes     Comment: Social 2-3x/yr   • Drug use: Yes     Frequency: 3.0 times per week     Types: Marijuana     Comment: Daily for pain management   • Sexual activity: Never         Physical Exam   Constitutional: She  is oriented to person, place, and time. She appears well-developed and well-nourished. No distress.   HENT:   Head: Normocephalic and atraumatic.   Nose: Nose normal.   Mouth/Throat: Oropharynx is clear and moist. No oropharyngeal exudate.   Eyes: Pupils are equal, round, and reactive to light. Conjunctivae and EOM are normal.   Neck: Normal range of motion. Neck supple.   Cardiovascular: Normal rate, regular rhythm and normal heart sounds.   Pulmonary/Chest: Effort normal. No respiratory distress. She has no wheezes. She has no rales.   Abdominal: Soft. She exhibits no distension.   Musculoskeletal: Normal range of motion. She exhibits no edema or deformity.   Neurological: She is alert and oriented to person, place, and time.   Skin: Skin is warm and dry. No rash noted.   Psychiatric: She has a normal mood and affect. Her behavior is normal. Judgment and thought content normal.   Nursing note and vitals reviewed.        Assessment/Plan    Iris was seen today for cough.    Diagnoses and all orders for this visit:    Cigarette smoker  -     XR Chest PA & Lateral; Future  -     CT Chest Low Dose Wo; Future    Bronchitis  -     azithromycin (Zithromax) 250 MG tablet; Take 2 tablets the first day, then 1 tablet daily for 4 days.  -     XR Chest PA & Lateral; Future    Chronic obstructive pulmonary disease, unspecified COPD type (CMS/HCC)  -     azithromycin (Zithromax) 250 MG tablet; Take 2 tablets the first day, then 1 tablet daily for 4 days.  -     XR Chest PA & Lateral; Future    Encounter for screening for lung cancer  -     CT Chest Low Dose Wo; Future      Patient here mostly because of a persistent cough bronchitis.  Little sputum production.  Unfortunately continues to smoke and strongly recommended discontinuing or at least decreasing as much as possible.  In view of persistent cough will get chest x-ray in the morning.  Also is past due for annual chest CT screening because of longstanding history of  cigarette use.  All of this was discussed.  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  Has been seen by orthopedist.    This note includes information entered using a voice recognition dictation system.  Though reviewed, some nonsensible errors may remain.        Answers for HPI/ROS submitted by the patient on 3/18/2020   Cough  What is the primary reason for your visit?: Cough  Chronicity: new  Onset: in the past 7 days  Progression since onset: waxing and waning  Frequency: every few hours  Cough characteristics: non-productive  ear congestion: No  heartburn: No  hemoptysis: No  nasal congestion: No  sweats: No  weight loss: No  Aggravated by: cold air, dust, pollens  Risk factors for lung disease: smoking/tobacco exposure

## 2020-04-03 DIAGNOSIS — J45.909 ASTHMATIC BRONCHITIS WITHOUT COMPLICATION, UNSPECIFIED ASTHMA SEVERITY, UNSPECIFIED WHETHER PERSISTENT: ICD-10-CM

## 2020-04-03 RX ORDER — ALBUTEROL SULFATE 90 UG/1
AEROSOL, METERED RESPIRATORY (INHALATION)
Qty: 18 G | Refills: 0 | Status: SHIPPED | OUTPATIENT
Start: 2020-04-03 | End: 2022-03-18 | Stop reason: SDUPTHER

## 2020-05-01 ENCOUNTER — APPOINTMENT (OUTPATIENT)
Dept: CT IMAGING | Facility: HOSPITAL | Age: 60
End: 2020-05-01

## 2020-06-09 ENCOUNTER — OFFICE VISIT (OUTPATIENT)
Dept: ORTHOPEDIC SURGERY | Facility: CLINIC | Age: 60
End: 2020-06-09

## 2020-06-09 VITALS — WEIGHT: 261.4 LBS | HEIGHT: 67 IN | TEMPERATURE: 97.7 F | BODY MASS INDEX: 41.03 KG/M2

## 2020-06-09 DIAGNOSIS — M25.552 BILATERAL HIP PAIN: Primary | ICD-10-CM

## 2020-06-09 DIAGNOSIS — M16.12 PRIMARY OSTEOARTHRITIS OF LEFT HIP: ICD-10-CM

## 2020-06-09 DIAGNOSIS — M25.551 BILATERAL HIP PAIN: Primary | ICD-10-CM

## 2020-06-09 PROCEDURE — 73522 X-RAY EXAM HIPS BI 3-4 VIEWS: CPT | Performed by: NURSE PRACTITIONER

## 2020-06-09 PROCEDURE — 99214 OFFICE O/P EST MOD 30 MIN: CPT | Performed by: NURSE PRACTITIONER

## 2020-06-17 ENCOUNTER — HOSPITAL ENCOUNTER (OUTPATIENT)
Dept: GENERAL RADIOLOGY | Facility: HOSPITAL | Age: 60
Discharge: HOME | End: 2020-06-17
Admitting: NURSE PRACTITIONER

## 2020-06-17 DIAGNOSIS — M16.12 PRIMARY OSTEOARTHRITIS OF LEFT HIP: ICD-10-CM

## 2020-06-17 PROCEDURE — 77002 NEEDLE LOCALIZATION BY XRAY: CPT

## 2020-06-17 PROCEDURE — 25010000003 LIDOCAINE 1 % SOLUTION: Performed by: RADIOLOGY

## 2020-06-17 PROCEDURE — 25010000002 IOPAMIDOL 61 % SOLUTION: Performed by: RADIOLOGY

## 2020-06-17 PROCEDURE — 25010000002 METHYLPREDNISOLONE PER 125 MG: Performed by: RADIOLOGY

## 2020-06-17 RX ORDER — METHYLPREDNISOLONE SODIUM SUCCINATE 125 MG/2ML
80 INJECTION, POWDER, LYOPHILIZED, FOR SOLUTION INTRAMUSCULAR; INTRAVENOUS
Status: COMPLETED | OUTPATIENT
Start: 2020-06-17 | End: 2020-06-17

## 2020-06-17 RX ORDER — BUPIVACAINE HYDROCHLORIDE 2.5 MG/ML
10 INJECTION, SOLUTION EPIDURAL; INFILTRATION; INTRACAUDAL ONCE
Status: COMPLETED | OUTPATIENT
Start: 2020-06-17 | End: 2020-06-17

## 2020-06-17 RX ORDER — LIDOCAINE HYDROCHLORIDE 10 MG/ML
10 INJECTION, SOLUTION INFILTRATION; PERINEURAL ONCE
Status: COMPLETED | OUTPATIENT
Start: 2020-06-17 | End: 2020-06-17

## 2020-06-17 RX ADMIN — IOPAMIDOL 1 ML: 612 INJECTION, SOLUTION INTRAVENOUS at 10:47

## 2020-06-17 RX ADMIN — LIDOCAINE HYDROCHLORIDE 3 ML: 10 INJECTION, SOLUTION INFILTRATION; PERINEURAL at 10:47

## 2020-06-17 RX ADMIN — BUPIVACAINE HYDROCHLORIDE 5 ML: 2.5 INJECTION, SOLUTION EPIDURAL; INFILTRATION; INTRACAUDAL; PERINEURAL at 10:47

## 2020-06-17 RX ADMIN — METHYLPREDNISOLONE SODIUM SUCCINATE 80 MG: 125 INJECTION, POWDER, LYOPHILIZED, FOR SOLUTION INTRAMUSCULAR; INTRAVENOUS at 10:47

## 2020-06-29 DIAGNOSIS — E03.9 ACQUIRED HYPOTHYROIDISM: ICD-10-CM

## 2020-06-29 RX ORDER — LEVOTHYROXINE SODIUM 0.2 MG/1
200 TABLET ORAL DAILY
Qty: 90 TABLET | Refills: 3 | Status: SHIPPED | OUTPATIENT
Start: 2020-06-29 | End: 2021-08-03

## 2020-07-01 ENCOUNTER — TELEPHONE (OUTPATIENT)
Dept: ORTHOPEDIC SURGERY | Facility: CLINIC | Age: 60
End: 2020-07-01

## 2020-07-01 NOTE — TELEPHONE ENCOUNTER
----- Message from Iris Hyde sent at 6/30/2020  8:45 PM EDT -----  Regarding: Non-Urgent Medical Question  Contact: [phone]  I am so sorry I missed my appointment this morning. 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 I ended up being there until close to 11am. I had the fluro guided steroid injection on 6/17 and that helped a lot but after I work 3-4 days in a row, it starts hurting again. After I work 4-5 days in a row, it takes all my time off work to recover. I need to reschedule my appointment ASAP please.  Thank you,  Iris Hyde

## 2020-07-07 ENCOUNTER — TELEPHONE (OUTPATIENT)
Dept: FAMILY MEDICINE CLINIC | Facility: CLINIC | Age: 60
End: 2020-07-07

## 2020-07-07 NOTE — TELEPHONE ENCOUNTER
Patient calling and needs to have COVID test before she can return to work. Please call back to advise on next steps.  She does have a headache and fever but no other symptoms. She is a healthcare work as well.     Best call back number [phone]

## 2020-07-10 ENCOUNTER — TELEPHONE (OUTPATIENT)
Dept: URGENT CARE | Facility: CLINIC | Age: 60
End: 2020-07-10

## 2020-07-10 NOTE — TELEPHONE ENCOUNTER
Pt advised of negative COVID test results and CDC self-quarantine guidelines.  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

## 2020-07-22 ENCOUNTER — TELEPHONE (OUTPATIENT)
Dept: FAMILY MEDICINE CLINIC | Facility: CLINIC | Age: 60
End: 2020-07-22

## 2020-08-05 ENCOUNTER — HOSPITAL ENCOUNTER (OUTPATIENT)
Dept: CT IMAGING | Facility: HOSPITAL | Age: 60
Discharge: HOME | End: 2020-08-05

## 2020-08-05 DIAGNOSIS — F17.210 CIGARETTE SMOKER: ICD-10-CM

## 2020-08-05 DIAGNOSIS — Z12.2 ENCOUNTER FOR SCREENING FOR LUNG CANCER: ICD-10-CM

## 2020-08-05 PROCEDURE — G0297 LDCT FOR LUNG CA SCREEN: HCPCS

## 2020-09-03 DIAGNOSIS — M79.7 FIBROMYALGIA: ICD-10-CM

## 2020-09-03 RX ORDER — CYCLOBENZAPRINE HCL 10 MG
TABLET ORAL
Qty: 90 TABLET | Refills: 11 | Status: SHIPPED | OUTPATIENT
Start: 2020-09-03 | End: 2021-09-09

## 2020-09-22 RX ORDER — PROMETHAZINE HYDROCHLORIDE 25 MG/1
25 TABLET ORAL EVERY 6 HOURS PRN
Qty: 100 TABLET | Refills: 5 | Status: SHIPPED | OUTPATIENT
Start: 2020-09-22 | End: 2022-03-18 | Stop reason: SDUPTHER

## 2020-09-22 RX ORDER — PROMETHAZINE HYDROCHLORIDE 25 MG/1
TABLET ORAL
Qty: 100 TABLET | Refills: 5 | OUTPATIENT
Start: 2020-09-22

## 2020-10-03 DIAGNOSIS — F41.8 MIXED ANXIETY DEPRESSIVE DISORDER: ICD-10-CM

## 2020-10-05 RX ORDER — VENLAFAXINE HYDROCHLORIDE 75 MG/1
75 CAPSULE, EXTENDED RELEASE ORAL DAILY
Qty: 30 CAPSULE | Refills: 11 | OUTPATIENT
Start: 2020-10-05

## 2021-03-23 ENCOUNTER — TELEPHONE (OUTPATIENT)
Dept: FAMILY MEDICINE CLINIC | Facility: CLINIC | Age: 61
End: 2021-03-23

## 2021-03-23 NOTE — TELEPHONE ENCOUNTER
Patient called and would like a call back from Flavia about an appt for this Thursday. Attempted warm transfer unsuccessful. First available I seen was 4/2. Please call [phone].

## 2021-03-24 ENCOUNTER — BULK ORDERING (OUTPATIENT)
Dept: CASE MANAGEMENT | Facility: OTHER | Age: 61
End: 2021-03-24

## 2021-03-24 DIAGNOSIS — Z23 IMMUNIZATION DUE: ICD-10-CM

## 2021-04-02 ENCOUNTER — IMMUNIZATION (OUTPATIENT)
Dept: VACCINE CLINIC | Facility: HOSPITAL | Age: 61
End: 2021-04-02

## 2021-04-02 ENCOUNTER — OFFICE VISIT (OUTPATIENT)
Dept: FAMILY MEDICINE CLINIC | Facility: CLINIC | Age: 61
End: 2021-04-02

## 2021-04-02 VITALS
SYSTOLIC BLOOD PRESSURE: 130 MMHG | WEIGHT: 293 LBS | DIASTOLIC BLOOD PRESSURE: 80 MMHG | RESPIRATION RATE: 20 BRPM | TEMPERATURE: 97.4 F | BODY MASS INDEX: 45.99 KG/M2 | OXYGEN SATURATION: 98 % | HEART RATE: 77 BPM | HEIGHT: 67 IN

## 2021-04-02 DIAGNOSIS — R35.0 INCREASED FREQUENCY OF URINATION: ICD-10-CM

## 2021-04-02 DIAGNOSIS — F17.210 CIGARETTE SMOKER: ICD-10-CM

## 2021-04-02 DIAGNOSIS — Z23 IMMUNIZATION DUE: ICD-10-CM

## 2021-04-02 DIAGNOSIS — F41.8 MIXED ANXIETY DEPRESSIVE DISORDER: ICD-10-CM

## 2021-04-02 DIAGNOSIS — E03.9 ACQUIRED HYPOTHYROIDISM: ICD-10-CM

## 2021-04-02 DIAGNOSIS — R73.9 HYPERGLYCEMIA: ICD-10-CM

## 2021-04-02 DIAGNOSIS — I10 ESSENTIAL HYPERTENSION: Primary | ICD-10-CM

## 2021-04-02 DIAGNOSIS — Z79.899 HIGH RISK MEDICATION USE: ICD-10-CM

## 2021-04-02 DIAGNOSIS — Z02.9 ADMINISTRATIVE ENCOUNTER: ICD-10-CM

## 2021-04-02 DIAGNOSIS — K31.84 GASTROPARESIS: ICD-10-CM

## 2021-04-02 PROCEDURE — 91300 HC SARSCOV02 VAC 30MCG/0.3ML IM: CPT | Performed by: INTERNAL MEDICINE

## 2021-04-02 PROCEDURE — 0001A: CPT | Performed by: INTERNAL MEDICINE

## 2021-04-02 PROCEDURE — 99214 OFFICE O/P EST MOD 30 MIN: CPT | Performed by: FAMILY MEDICINE

## 2021-04-02 RX ORDER — VENLAFAXINE HYDROCHLORIDE 75 MG/1
75 CAPSULE, EXTENDED RELEASE ORAL DAILY
Qty: 30 CAPSULE | Refills: 11 | Status: SHIPPED | OUTPATIENT
Start: 2021-04-02 | End: 2022-03-18 | Stop reason: SDUPTHER

## 2021-04-02 RX ORDER — SUCRALFATE 1 G/1
1 TABLET ORAL 4 TIMES DAILY
Qty: 120 TABLET | Refills: 5 | Status: SHIPPED | OUTPATIENT
Start: 2021-04-02 | End: 2022-11-28

## 2021-04-02 RX ORDER — CALCIUM CARBONATE 300MG(750)
TABLET,CHEWABLE ORAL
COMMUNITY
Start: 2020-12-01

## 2021-04-02 RX ORDER — PANTOPRAZOLE SODIUM 40 MG/1
40 TABLET, DELAYED RELEASE ORAL DAILY
Qty: 30 TABLET | Refills: 11 | Status: SHIPPED | OUTPATIENT
Start: 2021-04-02 | End: 2022-11-28 | Stop reason: SDUPTHER

## 2021-04-02 NOTE — PROGRESS NOTES
HPI  Iris Hyde is a 60 y.o. female who is here for follow up of multiple medical issues in requesting multiple lab works for a job she is going to have in [place].  This includes MMR and chickenpox antibody tests.  Also discussed TB screening but patient says TB skin tests are currently up-to-date.  Requesting refill on Protonix and Carafate for gastroparesis.  Unfortunately resumed smoking after recent hospitalization for kidney stones.  Other routine lab work will be obtained as discussed.      Review of Systems   Gastrointestinal:        Followed by gastroenterologist for gastroparesis   All other systems reviewed and are negative.        Past Medical History:   Diagnosis Date   • Alkaline phosphatase elevation    • Alopecia    • Anxiety    • Arthritis    • Asthmatic bronchitis    • Bladder spasms    • Cholelithiasis 1989    Removed 1989   • Chronic low back pain    • Colitis    • Colon polyp 4 yrs   • Depression    • Diverticulitis of colon not sure   • Eczema    • Esophageal reflux    • Fibromyalgia    • Headache    • Hernia not sure   • High risk medication use    • Hypertension not sure   • Lactose intolerance 5 yrs   • Migraine headache    • Neoplasm of uncertain behavior of breast    • Skin cancer    • Sleep apnea with use of continuous positive airway pressure (CPAP)    • Thyroid disorder    • Urinary frequency    • Urinary pain        Past Surgical History:   Procedure Laterality Date   • ABDOMINAL SURGERY  1980    Appendectomy/exp lap   • APPENDECTOMY  1980    DR. MATIAS   • CHOLECYSTECTOMY  1989    DR. IGOR FUNES   • COLONOSCOPY  09/20/2013    Two 8-9mm polyps in the rectum and in the sigmoid colon, 5mm polyp in the transverse colon, two 2-3mm polyps in the sigmoid colon, NBIH.  PATH: Tubular adenom, hyperplastic changes.    • COLONOSCOPY N/A 8/21/2018    diverticulosis, NBIH, mixed TA/hyperplastic polyps   • CRANIOTOMY  1996   • CYSTOSCOPY W/ URETERAL STENT PLACEMENT Right 12/6/2018     Procedure: CYSTO  RIGHT STENT RIGHT RETROGRADE;  Surgeon: Evin Glover MD;  Location: [facility];  Service: Urology   • ENDOSCOPY  08/29/2013    LA Grade A reflux esophagitis, HH, erosive gastritis, A single small papule with no stigmata of recent bleeding was found.  PATH:Benign.    • ENDOSCOPY N/A 8/21/2018    small hiatal hernia, gastritis, normal examined duodenum   • HYSTERECTOMY  2000   • TUBAL ABDOMINAL LIGATION  1982   • UPPER GASTROINTESTINAL ENDOSCOPY  8/21/2018       Family History   Problem Relation Age of Onset   • Heart disease Other    • Diabetes Other    • Alcohol abuse Other    • Anxiety disorder Other    • Bipolar disorder Other    • Cancer Other    • Depression Other    • Lung disease Other    • Kidney disease Other    • Rheum arthritis Other    • Thyroid disease Other        Social History     Socioeconomic History   • Marital status:      Spouse name: Not on file   • Number of children: Not on file   • Years of education: Not on file   • Highest education level: Not on file   Tobacco Use   • Smoking status: Current Every Day Smoker     Packs/day: 0.25     Years: 43.00     Pack years: 10.75   • Smokeless tobacco: Never Used   • Tobacco comment: Began smoking age 14.  Smoked 1 ppd for 43 pack year history.   Substance and Sexual Activity   • Alcohol use: Yes     Comment: Social 2-3x/yr   • Drug use: Yes     Frequency: 3.0 times per week     Types: Marijuana     Comment: Daily for pain management   • Sexual activity: Never       Vitals:    04/02/21 1328   BP: 130/80   Pulse: 77   Resp: 20   Temp: 97.4 °F (36.3 °C)   SpO2: 98%        Body mass index is 46.08 kg/m².      Physical Exam  Vitals and nursing note reviewed.   Constitutional:       General: She is not in acute distress.     Appearance: She is well-developed. She is obese. She is not ill-appearing.   HENT:      Head: Normocephalic and atraumatic.      Nose:      Comments: Patient with mask.  Provider with mask and  shield  Eyes:      Conjunctiva/sclera: Conjunctivae normal.      Pupils: Pupils are equal, round, and reactive to light.   Neck:      Thyroid: No thyromegaly.   Cardiovascular:      Rate and Rhythm: Normal rate and regular rhythm.   Pulmonary:      Effort: Pulmonary effort is normal. No respiratory distress.      Breath sounds: Normal breath sounds.   Abdominal:      General: There is no distension.      Palpations: There is no mass.      Tenderness: There is no abdominal tenderness.      Hernia: No hernia is present.   Musculoskeletal:         General: No tenderness or deformity. Normal range of motion.      Cervical back: Normal range of motion.   Lymphadenopathy:      Cervical: No cervical adenopathy.   Skin:     General: Skin is warm and dry.      Coloration: Skin is not pale.      Findings: No rash.   Neurological:      General: No focal deficit present.      Mental Status: She is alert and oriented to person, place, and time.      Motor: No abnormal muscle tone.      Coordination: Coordination normal.   Psychiatric:         Mood and Affect: Mood normal.         Behavior: Behavior normal.         Thought Content: Thought content normal.         Judgment: Judgment normal.           Assessment/Plan    Diagnoses and all orders for this visit:    1. Essential hypertension (Primary)  -     Comprehensive Metabolic Panel    2. Acquired hypothyroidism  -     TSH+Free T4    3. Gastroparesis  -     Hemoglobin A1c    4. Increased frequency of urination  -     Urinalysis With Microscopic If Indicated (No Culture) - Urine, Clean Catch    5. High risk medication use  -     CBC & Differential  -     Comprehensive Metabolic Panel    6. Administrative encounter  -     Measles / Mumps / Rubella Immunity  -     Varicella zoster antibody, IgG    7. Hyperglycemia  -     Urinalysis With Microscopic If Indicated (No Culture) - Urine, Clean Catch      Patient here for follow-up of multiple ongoing medical issues some of which are  noted.  Requesting lab work for job requirement in [place].  Will get routine lab work in again rule out diabetes as etiology of gastroparesis.  Requesting refill of medication.  Await lab work and otherwise follow-up in 6 months or as needed.    This note includes information entered using a voice recognition dictation system.  Though reviewed, some nonsensible errors may remain.        Answers for HPI/ROS submitted by the patient on 4/2/2021  Please describe your symptoms.: I’m not having any symptoms but i would like a UA, I need my titers drawn for MMR and varicella and a mini physical for a new job. I need a new prescription for Venlafaxine, Carafate and Pantoprazole.  Have you had these symptoms before?: No  How long have you been having these symptoms?: 1-4 days  Please list any medications you are currently taking for this condition.: Levothyroxine 200mcg, Cyclobenzaprine 10mg tid, Aleve, Protonix, Phenergan/PRN  Please describe any probable cause for these symptoms. : not having any symptoms  What is the primary reason for your visit?: Other

## 2021-04-05 LAB
ALBUMIN SERPL-MCNC: 4 G/DL (ref 3.8–4.9)
ALBUMIN/GLOB SERPL: 1.5 {RATIO} (ref 1.2–2.2)
ALP SERPL-CCNC: 171 IU/L (ref 39–117)
ALT SERPL-CCNC: 13 IU/L (ref 0–32)
APPEARANCE UR: ABNORMAL
AST SERPL-CCNC: 19 IU/L (ref 0–40)
BACTERIA #/AREA URNS HPF: ABNORMAL /[HPF]
BASOPHILS # BLD AUTO: 0.1 X10E3/UL (ref 0–0.2)
BASOPHILS NFR BLD AUTO: 1 %
BILIRUB SERPL-MCNC: <0.2 MG/DL (ref 0–1.2)
BILIRUB UR QL STRIP: NEGATIVE
BUN SERPL-MCNC: 14 MG/DL (ref 6–24)
BUN/CREAT SERPL: 15 (ref 9–23)
CALCIUM SERPL-MCNC: 8.9 MG/DL (ref 8.7–10.2)
CASTS URNS MICRO: ABNORMAL
CASTS URNS QL MICRO: PRESENT /LPF
CHLORIDE SERPL-SCNC: 101 MMOL/L (ref 96–106)
CO2 SERPL-SCNC: 24 MMOL/L (ref 20–29)
COLOR UR: YELLOW
CREAT SERPL-MCNC: 0.91 MG/DL (ref 0.57–1)
EOSINOPHIL # BLD AUTO: 0.3 X10E3/UL (ref 0–0.4)
EOSINOPHIL NFR BLD AUTO: 3 %
EPI CELLS #/AREA URNS HPF: ABNORMAL /HPF (ref 0–10)
ERYTHROCYTE [DISTWIDTH] IN BLOOD BY AUTOMATED COUNT: 13 % (ref 11.7–15.4)
GLOBULIN SER CALC-MCNC: 2.6 G/DL (ref 1.5–4.5)
GLUCOSE SERPL-MCNC: 92 MG/DL (ref 65–99)
GLUCOSE UR QL: NEGATIVE
HBA1C MFR BLD: 6 % (ref 4.8–5.6)
HCT VFR BLD AUTO: 41.8 % (ref 34–46.6)
HGB BLD-MCNC: 14.4 G/DL (ref 11.1–15.9)
HGB UR QL STRIP: NEGATIVE
IMM GRANULOCYTES # BLD AUTO: 0.1 X10E3/UL (ref 0–0.1)
IMM GRANULOCYTES NFR BLD AUTO: 1 %
KETONES UR QL STRIP: ABNORMAL
LEUKOCYTE ESTERASE UR QL STRIP: NEGATIVE
LYMPHOCYTES # BLD AUTO: 4.1 X10E3/UL (ref 0.7–3.1)
LYMPHOCYTES NFR BLD AUTO: 44 %
MCH RBC QN AUTO: 31.6 PG (ref 26.6–33)
MCHC RBC AUTO-ENTMCNC: 34.4 G/DL (ref 31.5–35.7)
MCV RBC AUTO: 92 FL (ref 79–97)
MICRO URNS: ABNORMAL
MONOCYTES # BLD AUTO: 0.6 X10E3/UL (ref 0.1–0.9)
MONOCYTES NFR BLD AUTO: 6 %
MUCOUS THREADS URNS QL MICRO: PRESENT
NEUTROPHILS # BLD AUTO: 4.2 X10E3/UL (ref 1.4–7)
NEUTROPHILS NFR BLD AUTO: 45 %
NITRITE UR QL STRIP: NEGATIVE
PH UR STRIP: 5.5 [PH] (ref 5–7.5)
PLATELET # BLD AUTO: 233 X10E3/UL (ref 150–450)
POTASSIUM SERPL-SCNC: 3.9 MMOL/L (ref 3.5–5.2)
PROT SERPL-MCNC: 6.6 G/DL (ref 6–8.5)
PROT UR QL STRIP: ABNORMAL
RBC # BLD AUTO: 4.56 X10E6/UL (ref 3.77–5.28)
RBC #/AREA URNS HPF: ABNORMAL /HPF (ref 0–2)
SODIUM SERPL-SCNC: 140 MMOL/L (ref 134–144)
SP GR UR: 1.03 (ref 1–1.03)
T4 FREE SERPL-MCNC: 1.11 NG/DL (ref 0.82–1.77)
TSH SERPL DL<=0.005 MIU/L-ACNC: 0.31 UIU/ML (ref 0.45–4.5)
UROBILINOGEN UR STRIP-MCNC: 1 MG/DL (ref 0.2–1)
VZV IGG SER IA-ACNC: 1444 INDEX
WBC # BLD AUTO: 9.3 X10E3/UL (ref 3.4–10.8)
WBC #/AREA URNS HPF: ABNORMAL /HPF (ref 0–5)

## 2021-04-14 ENCOUNTER — TELEPHONE (OUTPATIENT)
Dept: FAMILY MEDICINE CLINIC | Facility: CLINIC | Age: 61
End: 2021-04-14

## 2021-04-14 DIAGNOSIS — Z02.9 ADMINISTRATIVE ENCOUNTER: Primary | ICD-10-CM

## 2021-04-14 DIAGNOSIS — Z02.1 ENCOUNTER FOR PRE-EMPLOYMENT HEALTH SCREENING EXAMINATION: ICD-10-CM

## 2021-04-15 DIAGNOSIS — R82.71 BACTERIA IN URINE: Primary | ICD-10-CM

## 2021-04-16 LAB
MEV IGG SER IA-ACNC: >300 AU/ML
MUV IGG SER IA-ACNC: >300 AU/ML
RUBV IGG SERPL IA-ACNC: 23.9 INDEX

## 2021-04-23 ENCOUNTER — IMMUNIZATION (OUTPATIENT)
Dept: VACCINE CLINIC | Facility: HOSPITAL | Age: 61
End: 2021-04-23

## 2021-04-23 PROCEDURE — 0002A: CPT | Performed by: INTERNAL MEDICINE

## 2021-04-23 PROCEDURE — 91300 HC SARSCOV02 VAC 30MCG/0.3ML IM: CPT | Performed by: INTERNAL MEDICINE

## 2021-05-27 ENCOUNTER — OFFICE VISIT (OUTPATIENT)
Dept: ORTHOPEDIC SURGERY | Facility: CLINIC | Age: 61
End: 2021-05-27

## 2021-05-27 VITALS — BODY MASS INDEX: 45.99 KG/M2 | WEIGHT: 293 LBS | HEIGHT: 67 IN | TEMPERATURE: 98.2 F

## 2021-05-27 DIAGNOSIS — R52 PAIN: Primary | ICD-10-CM

## 2021-05-27 DIAGNOSIS — M16.12 PRIMARY OSTEOARTHRITIS OF LEFT HIP: ICD-10-CM

## 2021-05-27 PROCEDURE — 99213 OFFICE O/P EST LOW 20 MIN: CPT | Performed by: NURSE PRACTITIONER

## 2021-05-27 PROCEDURE — 73502 X-RAY EXAM HIP UNI 2-3 VIEWS: CPT | Performed by: NURSE PRACTITIONER

## 2021-05-27 NOTE — PROGRESS NOTES
Patient: Iris Hyde  YOB: 1960 61 y.o. female  Medical Record Number: [id number]    Chief Complaints:   Chief Complaint   Patient presents with   • Left Hip - Follow-up, Pain       History of Present Illness:Iris Hyde is a 61 y.o. female who presents with complaints of increasing left hip pain.  She was seen previously and had some mild arthritic changes and had a fluoroscopy guided cortisone injection which helped considerably.  Patient reports she has had increased pain over the last several months, it is now a moderate sometimes severe ache worse with standing walking prolonged distances slightly better with rest.  She has tried Mobic with no improvement    Allergies:   Allergies   Allergen Reactions   • Butorphanol Mental Status Change   • Butorphanol Tartrate Other (See Comments)     PSYCHOTIC   • Hydrocodone-Acetaminophen Itching     norco    • Cymbalta [Duloxetine Hcl] Anxiety   • Hydrocodone-Acetaminophen Rash       Medications:   Current Outpatient Medications   Medication Sig Dispense Refill   • ALBUTEROL SULFATE  (90 Base) MCG/ACT inhaler INHALE 2 PUFFS BY MOUTH EVERY 4 HOURS AS NEEDED FOR WHEEZING 18 g 0   • cyclobenzaprine (FLEXERIL) 10 MG tablet TAKE 1 TABLET BY MOUTH THREE TIMES DAILY 90 tablet 11   • levothyroxine (SYNTHROID, LEVOTHROID) 200 MCG tablet TAKE 1 TABLET BY MOUTH DAILY 90 tablet 3   • Melatonin 10 MG tablet dispersible      • pantoprazole (Protonix) 40 MG EC tablet Take 1 tablet by mouth Daily. 30 tablet 11   • promethazine (PHENERGAN) 25 MG tablet Take 1 tablet by mouth Every 6 (Six) Hours As Needed for Nausea or Vomiting. 100 tablet 5   • sucralfate (Carafate) 1 g tablet Take 1 tablet by mouth 4 (Four) Times a Day. 120 tablet 5   • venlafaxine XR (EFFEXOR-XR) 75 MG 24 hr capsule Take 1 capsule by mouth Daily. 30 capsule 11     No current facility-administered medications for this visit.         The following portions of the patient's history were reviewed  "and updated as appropriate: allergies, current medications, past family history, past medical history, past social history, past surgical history and problem list.    Review of Systems:   A 14 point review of systems was performed. All systems negative except pertinent positives/negative listed in HPI above    Physical Exam:   Vitals:    05/27/21 0834   Temp: 98.2 °F (36.8 °C)   Weight: 134 kg (295 lb)   Height: 170.2 cm (67\")   PainSc:   6   PainLoc: Hip       General: A and O x 3, ASA, NAD    SCLERA:    Normal    DENTITION:   Normal  Skin clear no unusual lesions noted  Left hip patient is nontender palpation she has pain with internal and external rotation with a positive Stinchfield negative logroll calf is soft and nontender       Radiology:  Xrays 2 views of left hip ordered and reviewed today secondary to pain and show bone-on-bone end-stage osteoarthritis with cyst and spur formation.  Compared to views show definite progression in arthritic changes    Assessment/Plan: End-stage osteoarthritis left hip    Patient I discussed options, she would like to proceed with left hip fluoroscopy guided cortisone injection, continued over-the-counter anti-inflammatories.  She knows at some point most likely going to need total hip replacement but she would like to hold off at least for another 6 months.  She will follow-up with me in 3 months but will call if her symptoms worsen      Pura Escobar, APRN  5/27/2021  "

## 2021-06-10 ENCOUNTER — HOSPITAL ENCOUNTER (OUTPATIENT)
Dept: GENERAL RADIOLOGY | Facility: HOSPITAL | Age: 61
Discharge: HOME | End: 2021-06-10
Admitting: NURSE PRACTITIONER

## 2021-06-10 DIAGNOSIS — M16.12 PRIMARY OSTEOARTHRITIS OF LEFT HIP: ICD-10-CM

## 2021-06-10 PROCEDURE — 77002 NEEDLE LOCALIZATION BY XRAY: CPT

## 2021-06-10 PROCEDURE — 25010000003 LIDOCAINE 1 % SOLUTION: Performed by: NURSE PRACTITIONER

## 2021-06-10 PROCEDURE — 25010000002 IOPAMIDOL 61 % SOLUTION: Performed by: NURSE PRACTITIONER

## 2021-06-10 PROCEDURE — 25010000002 METHYLPREDNISOLONE PER 125 MG: Performed by: NURSE PRACTITIONER

## 2021-06-10 RX ORDER — METHYLPREDNISOLONE SODIUM SUCCINATE 125 MG/2ML
80 INJECTION, POWDER, LYOPHILIZED, FOR SOLUTION INTRAMUSCULAR; INTRAVENOUS
Status: COMPLETED | OUTPATIENT
Start: 2021-06-10 | End: 2021-06-10

## 2021-06-10 RX ORDER — LIDOCAINE HYDROCHLORIDE 10 MG/ML
20 INJECTION, SOLUTION INFILTRATION; PERINEURAL ONCE
Status: COMPLETED | OUTPATIENT
Start: 2021-06-10 | End: 2021-06-10

## 2021-06-10 RX ORDER — BUPIVACAINE HYDROCHLORIDE 2.5 MG/ML
10 INJECTION, SOLUTION EPIDURAL; INFILTRATION; INTRACAUDAL ONCE
Status: COMPLETED | OUTPATIENT
Start: 2021-06-10 | End: 2021-06-10

## 2021-06-10 RX ADMIN — LIDOCAINE HYDROCHLORIDE 3 ML: 10 INJECTION, SOLUTION INFILTRATION; PERINEURAL at 14:58

## 2021-06-10 RX ADMIN — IOPAMIDOL 1 ML: 612 INJECTION, SOLUTION INTRAVENOUS at 14:58

## 2021-06-10 RX ADMIN — BUPIVACAINE HYDROCHLORIDE 5 ML: 2.5 INJECTION, SOLUTION EPIDURAL; INFILTRATION; INTRACAUDAL; PERINEURAL at 14:58

## 2021-06-10 RX ADMIN — METHYLPREDNISOLONE SODIUM SUCCINATE 80 MG: 125 INJECTION, POWDER, LYOPHILIZED, FOR SOLUTION INTRAMUSCULAR; INTRAVENOUS at 14:58

## 2021-07-03 NOTE — ANESTHESIA PROCEDURE NOTES
ANESTHESIA INTUBATION  Urgency: elective    Airway not difficult    General Information and Staff    Patient location during procedure: OR  Anesthesiologist: Cornell Connelly MD  CRNA: Wilma Linares CRNA    Indications and Patient Condition  Indications for airway management: airway protection    Preoxygenated: yes (pt pre-O2 with 100% O2)  Mask difficulty assessment: 2 - vent by mask + OA or adjuvant +/- NMBA (easy BMV )    Final Airway Details  Final airway type: endotracheal airway      Successful airway: ETT  Cuffed: yes   Successful intubation technique: direct laryngoscopy  Endotracheal tube insertion site: oral  Blade: Jerel  Blade size: 3  ETT size (mm): 7.0  Cormack-Lehane Classification: grade I - full view of glottis  Placement verified by: chest auscultation and capnometry   Cuff volume (mL): 7  Measured from: lips  ETT to lips (cm): 20  Number of attempts at approach: 1    Additional Comments  Appears ATOETx1. No change in dentition.             Addendum Note by David Trevizo MD at 4/9/2018  2:23 PM     Author: David Trevizo MD Service: -- Author Type: Physician    Filed: 4/9/2018  2:23 PM Encounter Date: 4/5/2018 Status: Signed    : David Trevizo MD (Physician)    Addended by: DAVID TREVIZO on: 4/9/2018 02:23 PM        Modules accepted: Orders

## 2021-08-02 DIAGNOSIS — E03.9 ACQUIRED HYPOTHYROIDISM: ICD-10-CM

## 2021-08-03 RX ORDER — LEVOTHYROXINE SODIUM 0.2 MG/1
TABLET ORAL
Qty: 90 TABLET | Refills: 3 | Status: SHIPPED | OUTPATIENT
Start: 2021-08-03 | End: 2022-11-28 | Stop reason: SDUPTHER

## 2021-09-09 DIAGNOSIS — M79.7 FIBROMYALGIA: ICD-10-CM

## 2021-09-09 RX ORDER — CYCLOBENZAPRINE HCL 10 MG
TABLET ORAL
Qty: 90 TABLET | Refills: 0 | Status: SHIPPED | OUTPATIENT
Start: 2021-09-09 | End: 2021-11-04

## 2021-09-09 NOTE — TELEPHONE ENCOUNTER
Rx Refill Note  Requested Prescriptions     Pending Prescriptions Disp Refills   • cyclobenzaprine (FLEXERIL) 10 MG tablet [Pharmacy Med Name: Cyclobenzaprine HCl 10 MG Oral Tablet] 90 tablet 0     Sig: TAKE 1 TABLET BY MOUTH THREE TIMES DAILY      Last office visit with prescribing clinician: 4/2/2021      Next office visit with prescribing clinician: Visit date not found            Flavia Diaz MA  09/09/21, 16:27 EDT

## 2021-11-03 DIAGNOSIS — M79.7 FIBROMYALGIA: ICD-10-CM

## 2021-11-04 RX ORDER — CYCLOBENZAPRINE HCL 10 MG
TABLET ORAL
Qty: 90 TABLET | Refills: 5 | Status: SHIPPED | OUTPATIENT
Start: 2021-11-04 | End: 2022-11-28 | Stop reason: SDUPTHER

## 2021-11-04 NOTE — TELEPHONE ENCOUNTER
Rx Refill Note  Requested Prescriptions     Pending Prescriptions Disp Refills   • cyclobenzaprine (FLEXERIL) 10 MG tablet [Pharmacy Med Name: Cyclobenzaprine HCl 10 MG Oral Tablet] 90 tablet 0     Sig: TAKE 1 TABLET BY MOUTH THREE TIMES DAILY      Last office visit with prescribing clinician: 4/2/2021      Next office visit with prescribing clinician: Visit date not found            Manav Fisher MA  11/04/21, 07:10 EDT

## 2021-12-10 ENCOUNTER — CLINICAL SUPPORT (OUTPATIENT)
Dept: FAMILY MEDICINE CLINIC | Facility: CLINIC | Age: 61
End: 2021-12-10

## 2021-12-10 DIAGNOSIS — F17.210 CIGARETTE SMOKER: ICD-10-CM

## 2021-12-10 DIAGNOSIS — R30.0 DYSURIA: Primary | ICD-10-CM

## 2021-12-10 DIAGNOSIS — Z12.31 BREAST CANCER SCREENING BY MAMMOGRAM: Primary | ICD-10-CM

## 2021-12-10 DIAGNOSIS — R31.9 HEMATURIA, UNSPECIFIED TYPE: Primary | ICD-10-CM

## 2021-12-10 LAB
BILIRUB BLD-MCNC: NEGATIVE MG/DL
CLARITY, POC: CLEAR
COLOR UR: ABNORMAL
EXPIRATION DATE: ABNORMAL
GLUCOSE UR STRIP-MCNC: NEGATIVE MG/DL
KETONES UR QL: NEGATIVE
LEUKOCYTE EST, POC: ABNORMAL
Lab: ABNORMAL
NITRITE UR-MCNC: NEGATIVE MG/ML
PH UR: 6.5 [PH] (ref 5–8)
PROT UR STRIP-MCNC: ABNORMAL MG/DL
RBC # UR STRIP: ABNORMAL /UL
SP GR UR: 1.02 (ref 1–1.03)
UROBILINOGEN UR QL: NORMAL

## 2021-12-10 PROCEDURE — 99211 OFF/OP EST MAY X REQ PHY/QHP: CPT | Performed by: FAMILY MEDICINE

## 2021-12-10 PROCEDURE — 81003 URINALYSIS AUTO W/O SCOPE: CPT | Performed by: FAMILY MEDICINE

## 2021-12-12 LAB
BACTERIA UR CULT: NORMAL
BACTERIA UR CULT: NORMAL

## 2021-12-14 ENCOUNTER — HOSPITAL ENCOUNTER (OUTPATIENT)
Dept: MAMMOGRAPHY | Facility: HOSPITAL | Age: 61
Discharge: HOME | End: 2021-12-14
Admitting: FAMILY MEDICINE

## 2021-12-14 DIAGNOSIS — Z12.31 BREAST CANCER SCREENING BY MAMMOGRAM: ICD-10-CM

## 2021-12-14 PROCEDURE — 77067 SCR MAMMO BI INCL CAD: CPT

## 2021-12-14 PROCEDURE — 77063 BREAST TOMOSYNTHESIS BI: CPT

## 2021-12-17 ENCOUNTER — HOSPITAL ENCOUNTER (OUTPATIENT)
Dept: CT IMAGING | Facility: HOSPITAL | Age: 61
Discharge: HOME | End: 2021-12-17
Admitting: FAMILY MEDICINE

## 2021-12-17 DIAGNOSIS — R31.9 HEMATURIA, UNSPECIFIED TYPE: ICD-10-CM

## 2021-12-17 DIAGNOSIS — F17.210 CIGARETTE SMOKER: ICD-10-CM

## 2021-12-17 PROCEDURE — 74176 CT ABD & PELVIS W/O CONTRAST: CPT

## 2021-12-17 PROCEDURE — 71271 CT THORAX LUNG CANCER SCR C-: CPT

## 2021-12-20 DIAGNOSIS — F17.210 CIGARETTE SMOKER: Primary | ICD-10-CM

## 2022-03-18 ENCOUNTER — OFFICE VISIT (OUTPATIENT)
Dept: FAMILY MEDICINE CLINIC | Facility: CLINIC | Age: 62
End: 2022-03-18

## 2022-03-18 VITALS
SYSTOLIC BLOOD PRESSURE: 138 MMHG | BODY MASS INDEX: 45.99 KG/M2 | HEIGHT: 67 IN | HEART RATE: 70 BPM | OXYGEN SATURATION: 97 % | TEMPERATURE: 96.8 F | WEIGHT: 293 LBS | RESPIRATION RATE: 18 BRPM | DIASTOLIC BLOOD PRESSURE: 90 MMHG

## 2022-03-18 DIAGNOSIS — M79.7 FIBROMYALGIA: ICD-10-CM

## 2022-03-18 DIAGNOSIS — E03.9 ACQUIRED HYPOTHYROIDISM: ICD-10-CM

## 2022-03-18 DIAGNOSIS — N39.0 URINARY TRACT INFECTION WITH HEMATURIA, SITE UNSPECIFIED: ICD-10-CM

## 2022-03-18 DIAGNOSIS — Z79.899 HIGH RISK MEDICATION USE: ICD-10-CM

## 2022-03-18 DIAGNOSIS — I10 ESSENTIAL HYPERTENSION: ICD-10-CM

## 2022-03-18 DIAGNOSIS — F17.210 CIGARETTE SMOKER: ICD-10-CM

## 2022-03-18 DIAGNOSIS — Z86.010 HX OF ADENOMATOUS COLONIC POLYPS: ICD-10-CM

## 2022-03-18 DIAGNOSIS — J01.40 SUBACUTE PANSINUSITIS: ICD-10-CM

## 2022-03-18 DIAGNOSIS — F41.8 MIXED ANXIETY DEPRESSIVE DISORDER: ICD-10-CM

## 2022-03-18 DIAGNOSIS — E11.65 CONTROLLED TYPE 2 DIABETES MELLITUS WITH HYPERGLYCEMIA, WITH LONG-TERM CURRENT USE OF INSULIN: ICD-10-CM

## 2022-03-18 DIAGNOSIS — J45.909 ASTHMATIC BRONCHITIS WITHOUT COMPLICATION, UNSPECIFIED ASTHMA SEVERITY, UNSPECIFIED WHETHER PERSISTENT: ICD-10-CM

## 2022-03-18 DIAGNOSIS — R30.0 DYSURIA: Primary | ICD-10-CM

## 2022-03-18 DIAGNOSIS — R31.9 URINARY TRACT INFECTION WITH HEMATURIA, SITE UNSPECIFIED: ICD-10-CM

## 2022-03-18 DIAGNOSIS — E66.01 OBESITY, MORBID, BMI 40.0-49.9: ICD-10-CM

## 2022-03-18 DIAGNOSIS — K21.9 GASTROESOPHAGEAL REFLUX DISEASE, UNSPECIFIED WHETHER ESOPHAGITIS PRESENT: ICD-10-CM

## 2022-03-18 DIAGNOSIS — Z79.4 CONTROLLED TYPE 2 DIABETES MELLITUS WITH HYPERGLYCEMIA, WITH LONG-TERM CURRENT USE OF INSULIN: ICD-10-CM

## 2022-03-18 LAB
BILIRUB BLD-MCNC: NEGATIVE MG/DL
CLARITY, POC: ABNORMAL
COLOR UR: ABNORMAL
EXPIRATION DATE: ABNORMAL
GLUCOSE UR STRIP-MCNC: NEGATIVE MG/DL
KETONES UR QL: NEGATIVE
LEUKOCYTE EST, POC: ABNORMAL
Lab: ABNORMAL
NITRITE UR-MCNC: POSITIVE MG/ML
PH UR: 7.5 [PH] (ref 5–8)
PROT UR STRIP-MCNC: NEGATIVE MG/DL
RBC # UR STRIP: ABNORMAL /UL
SP GR UR: 1.02 (ref 1–1.03)
UROBILINOGEN UR QL: ABNORMAL

## 2022-03-18 PROCEDURE — 99214 OFFICE O/P EST MOD 30 MIN: CPT | Performed by: FAMILY MEDICINE

## 2022-03-18 PROCEDURE — 81003 URINALYSIS AUTO W/O SCOPE: CPT | Performed by: FAMILY MEDICINE

## 2022-03-18 RX ORDER — VENLAFAXINE HYDROCHLORIDE 150 MG/1
150 CAPSULE, EXTENDED RELEASE ORAL DAILY
Qty: 30 CAPSULE | Refills: 3 | Status: SHIPPED | OUTPATIENT
Start: 2022-03-18 | End: 2022-11-28

## 2022-03-18 RX ORDER — ALBUTEROL SULFATE 90 UG/1
2 AEROSOL, METERED RESPIRATORY (INHALATION) EVERY 4 HOURS PRN
Qty: 18 G | Refills: 0 | Status: SHIPPED | OUTPATIENT
Start: 2022-03-18 | End: 2022-11-28 | Stop reason: SDUPTHER

## 2022-03-18 RX ORDER — PROMETHAZINE HYDROCHLORIDE 25 MG/1
25 TABLET ORAL EVERY 6 HOURS PRN
Qty: 100 TABLET | Refills: 5 | Status: SHIPPED | OUTPATIENT
Start: 2022-03-18 | End: 2022-11-28

## 2022-03-18 RX ORDER — CEPHALEXIN 500 MG/1
500 CAPSULE ORAL 2 TIMES DAILY
Qty: 20 CAPSULE | Refills: 0 | Status: SHIPPED | OUTPATIENT
Start: 2022-03-18 | End: 2022-03-31

## 2022-03-18 RX ORDER — METHYLPREDNISOLONE 4 MG/1
TABLET ORAL
Qty: 21 TABLET | Refills: 0 | Status: SHIPPED | OUTPATIENT
Start: 2022-03-18 | End: 2022-03-31

## 2022-03-18 NOTE — PROGRESS NOTES
HPI  Iris Hyde is a 61 y.o. female who is here for follow up of multiple medical issues.  Having multiple family issues apparently daughter with drug problems.  Continues to work in nursing homes.  Reports severe sinus infection over the last few days with thick mucus.  Also persistent cough and recommend getting Mucinex DM.  Reports anxiety depressive issues and thinks may be bipolar.  Apparently has seen psychiatrist in the past but apparently he was recently arrested?  All of these issues were discussed.  Also discussed with patient will be needing to find a new primary care physician in the next few months.  Is past due for multiple lab works.  Apparently diagnosed with diabetes in the past but has been under control and she monitors blood pressure and blood sugars on a routine basis.  Does report some recent weight gain however.      Review of Systems   Constitutional: Negative for chills and fever.        Has tested negative for Covid recently   HENT: Positive for postnasal drip and rhinorrhea. Negative for ear pain and sore throat.    Respiratory: Positive for cough, shortness of breath and wheezing.    Cardiovascular: Negative for chest pain.   Musculoskeletal: Negative for myalgias.   Skin: Negative for rash.   Neurological: Positive for headaches.   All other systems reviewed and are negative.        Past Medical History:   Diagnosis Date   • Alkaline phosphatase elevation    • Alopecia    • Anxiety    • Arthritis    • Asthmatic bronchitis    • Bladder spasms    • Cholelithiasis 1989    Removed 1989   • Chronic low back pain    • Colitis    • Colon polyp 4 yrs   • Depression    • Diverticulitis of colon not sure   • Eczema    • Esophageal reflux    • Fibromyalgia    • Headache    • Hernia not sure   • High risk medication use    • Hypertension not sure   • Lactose intolerance 5 yrs   • Migraine headache    • Neoplasm of uncertain behavior of breast    • Skin cancer    • Sleep apnea with use of  continuous positive airway pressure (CPAP)    • Thyroid disorder    • Urinary frequency    • Urinary pain        Past Surgical History:   Procedure Laterality Date   • ABDOMINAL SURGERY  1980    Appendectomy/exp lap   • APPENDECTOMY  1980    DR. MATIAS   • CHOLECYSTECTOMY  1989    DR. IGOR FUNES   • COLONOSCOPY  09/20/2013    Two 8-9mm polyps in the rectum and in the sigmoid colon, 5mm polyp in the transverse colon, two 2-3mm polyps in the sigmoid colon, NBIH.  PATH: Tubular adenom, hyperplastic changes.    • COLONOSCOPY N/A 8/21/2018    diverticulosis, NBIH, mixed TA/hyperplastic polyps   • CRANIOTOMY  1996   • CYSTOSCOPY W/ URETERAL STENT PLACEMENT Right 12/6/2018    Procedure: CYSTO  RIGHT STENT RIGHT RETROGRADE;  Surgeon: Evin Glover MD;  Location: [facility];  Service: Urology   • ENDOSCOPY  08/29/2013    LA Grade A reflux esophagitis, HH, erosive gastritis, A single small papule with no stigmata of recent bleeding was found.  PATH:Benign.    • ENDOSCOPY N/A 8/21/2018    small hiatal hernia, gastritis, normal examined duodenum   • HYSTERECTOMY  2000   • TUBAL ABDOMINAL LIGATION  1982   • UPPER GASTROINTESTINAL ENDOSCOPY  8/21/2018       Family History   Problem Relation Age of Onset   • Heart disease Other    • Diabetes Other    • Alcohol abuse Other    • Anxiety disorder Other    • Bipolar disorder Other    • Cancer Other    • Depression Other    • Lung disease Other    • Kidney disease Other    • Rheum arthritis Other    • Thyroid disease Other        Social History     Socioeconomic History   • Marital status:    Tobacco Use   • Smoking status: Current Every Day Smoker     Packs/day: 0.25     Years: 43.00     Pack years: 10.75   • Smokeless tobacco: Never Used   • Tobacco comment: Began smoking age 14.  Smoked 1 ppd for 43 pack year history.   Substance and Sexual Activity   • Alcohol use: Yes     Comment: Social 2-3x/yr   • Drug use: Yes     Frequency: 3.0 times per week     Types:  Marijuana     Comment: Daily for pain management   • Sexual activity: Never       Vitals:    03/18/22 0930   BP: 138/90   Pulse: 70   Resp: 18   Temp: 96.8 °F (36 °C)   SpO2: 97%        Body mass index is 49.37 kg/m².      Physical Exam  Vitals and nursing note reviewed.   Constitutional:       General: She is not in acute distress.     Appearance: She is well-developed.   HENT:      Head: Normocephalic and atraumatic.      Nose:      Comments: Patient with mask.  Provider with mask and shield  Eyes:      Conjunctiva/sclera: Conjunctivae normal.      Pupils: Pupils are equal, round, and reactive to light.   Neck:      Thyroid: No thyromegaly.   Cardiovascular:      Rate and Rhythm: Normal rate and regular rhythm.      Heart sounds: Normal heart sounds.   Pulmonary:      Effort: Pulmonary effort is normal. No respiratory distress.      Breath sounds: Normal breath sounds.   Abdominal:      General: There is no distension.      Palpations: Abdomen is soft. There is no mass.      Tenderness: There is no abdominal tenderness.      Hernia: No hernia is present.   Musculoskeletal:         General: No tenderness or deformity. Normal range of motion.      Cervical back: Normal range of motion.   Lymphadenopathy:      Cervical: No cervical adenopathy.   Skin:     General: Skin is warm and dry.      Coloration: Skin is not pale.      Findings: No rash.   Neurological:      General: No focal deficit present.      Mental Status: She is alert and oriented to person, place, and time.      Motor: No abnormal muscle tone.      Coordination: Coordination normal.   Psychiatric:         Attention and Perception: Attention and perception normal.         Mood and Affect: Mood is anxious.         Speech: Speech normal.         Behavior: Behavior normal.         Thought Content: Thought content normal.         Cognition and Memory: Cognition normal.         Judgment: Judgment normal.           Assessment/Plan    Diagnoses and all orders  for this visit:    1. Dysuria (Primary)  -     POC Urinalysis Dipstick, Automated    2. Acquired hypothyroidism  -     TSH+Free T4    3. Essential hypertension  -     Comprehensive Metabolic Panel    4. Obesity, morbid, BMI 40.0-49.9 ([facility])    5. Gastroesophageal reflux disease, unspecified whether esophagitis present    6. Hx of adenomatous colonic polyps    7. Cigarette smoker    8. Fibromyalgia    9. High risk medication use  -     CBC & Differential  -     Comprehensive Metabolic Panel    10. Controlled type 2 diabetes mellitus with hyperglycemia, with long-term current use of insulin ([facility])  -     Hemoglobin A1c            Answers for HPI/ROS submitted by the patient on 3/15/2022  What is the primary reason for your visit?: Cough  Chronicity: recurrent  Onset: 1 to 4 weeks ago  Progression since onset: waxing and waning  Frequency: hourly  Cough characteristics: non-productive  ear congestion: Yes  heartburn: No  hemoptysis: No  nasal congestion: Yes  sweats: No  weight loss: No  Aggravated by: nothing  Risk factors for lung disease: smoking/tobacco exposure

## 2022-03-19 LAB
ALBUMIN SERPL-MCNC: 4.3 G/DL (ref 3.8–4.9)
ALBUMIN/GLOB SERPL: 1.6 {RATIO} (ref 1.2–2.2)
ALP SERPL-CCNC: 148 IU/L (ref 44–121)
ALT SERPL-CCNC: 18 IU/L (ref 0–32)
AST SERPL-CCNC: 23 IU/L (ref 0–40)
BASOPHILS # BLD AUTO: 0.1 X10E3/UL (ref 0–0.2)
BASOPHILS NFR BLD AUTO: 1 %
BILIRUB SERPL-MCNC: <0.2 MG/DL (ref 0–1.2)
BUN SERPL-MCNC: 10 MG/DL (ref 8–27)
BUN/CREAT SERPL: 11 (ref 12–28)
CALCIUM SERPL-MCNC: 9.8 MG/DL (ref 8.7–10.3)
CHLORIDE SERPL-SCNC: 103 MMOL/L (ref 96–106)
CO2 SERPL-SCNC: 25 MMOL/L (ref 20–29)
CREAT SERPL-MCNC: 0.88 MG/DL (ref 0.57–1)
EGFRCR SERPLBLD CKD-EPI 2021: 75 ML/MIN/1.73
EOSINOPHIL # BLD AUTO: 0.2 X10E3/UL (ref 0–0.4)
EOSINOPHIL NFR BLD AUTO: 2 %
ERYTHROCYTE [DISTWIDTH] IN BLOOD BY AUTOMATED COUNT: 13.3 % (ref 11.7–15.4)
GLOBULIN SER CALC-MCNC: 2.7 G/DL (ref 1.5–4.5)
GLUCOSE SERPL-MCNC: 115 MG/DL (ref 65–99)
HBA1C MFR BLD: 6.1 % (ref 4.8–5.6)
HCT VFR BLD AUTO: 42.6 % (ref 34–46.6)
HGB BLD-MCNC: 14.4 G/DL (ref 11.1–15.9)
IMM GRANULOCYTES # BLD AUTO: 0 X10E3/UL (ref 0–0.1)
IMM GRANULOCYTES NFR BLD AUTO: 0 %
LYMPHOCYTES # BLD AUTO: 4.7 X10E3/UL (ref 0.7–3.1)
LYMPHOCYTES NFR BLD AUTO: 53 %
MCH RBC QN AUTO: 31.9 PG (ref 26.6–33)
MCHC RBC AUTO-ENTMCNC: 33.8 G/DL (ref 31.5–35.7)
MCV RBC AUTO: 94 FL (ref 79–97)
MONOCYTES # BLD AUTO: 0.6 X10E3/UL (ref 0.1–0.9)
MONOCYTES NFR BLD AUTO: 6 %
NEUTROPHILS # BLD AUTO: 3.4 X10E3/UL (ref 1.4–7)
NEUTROPHILS NFR BLD AUTO: 38 %
PLATELET # BLD AUTO: 232 X10E3/UL (ref 150–450)
POTASSIUM SERPL-SCNC: 4.1 MMOL/L (ref 3.5–5.2)
PROT SERPL-MCNC: 7 G/DL (ref 6–8.5)
RBC # BLD AUTO: 4.52 X10E6/UL (ref 3.77–5.28)
SODIUM SERPL-SCNC: 141 MMOL/L (ref 134–144)
T4 FREE SERPL-MCNC: 0.8 NG/DL (ref 0.82–1.77)
TSH SERPL DL<=0.005 MIU/L-ACNC: 19.4 UIU/ML (ref 0.45–4.5)
WBC # BLD AUTO: 9 X10E3/UL (ref 3.4–10.8)

## 2022-03-21 LAB
BACTERIA UR CULT: NORMAL
BACTERIA UR CULT: NORMAL

## 2022-03-31 DIAGNOSIS — J01.40 SUBACUTE PANSINUSITIS: Primary | ICD-10-CM

## 2022-03-31 RX ORDER — AZITHROMYCIN 250 MG/1
TABLET, FILM COATED ORAL
Qty: 6 TABLET | Refills: 0 | OUTPATIENT
Start: 2022-03-31 | End: 2022-11-14

## 2022-03-31 RX ORDER — FLUTICASONE PROPIONATE 50 MCG
2 SPRAY, SUSPENSION (ML) NASAL DAILY
Qty: 16 G | Refills: 1 | Status: SHIPPED | OUTPATIENT
Start: 2022-03-31 | End: 2022-03-31 | Stop reason: ALTCHOICE

## 2022-03-31 RX ORDER — FLUTICASONE PROPIONATE 50 MCG
SPRAY, SUSPENSION (ML) NASAL
Qty: 16 G | Refills: 1 | Status: SHIPPED | OUTPATIENT
Start: 2022-03-31

## 2022-10-27 ENCOUNTER — OFFICE VISIT (OUTPATIENT)
Dept: ORTHOPEDIC SURGERY | Facility: CLINIC | Age: 62
End: 2022-10-27

## 2022-10-27 VITALS — BODY MASS INDEX: 45.99 KG/M2 | WEIGHT: 293 LBS | HEIGHT: 67 IN | TEMPERATURE: 97.6 F

## 2022-10-27 DIAGNOSIS — M25.551 RIGHT HIP PAIN: ICD-10-CM

## 2022-10-27 DIAGNOSIS — M25.552 LEFT HIP PAIN: ICD-10-CM

## 2022-10-27 DIAGNOSIS — R52 PAIN: Primary | ICD-10-CM

## 2022-10-27 PROCEDURE — 73522 X-RAY EXAM HIPS BI 3-4 VIEWS: CPT | Performed by: NURSE PRACTITIONER

## 2022-10-27 PROCEDURE — 99214 OFFICE O/P EST MOD 30 MIN: CPT | Performed by: NURSE PRACTITIONER

## 2022-10-27 RX ORDER — METHYLPREDNISOLONE 4 MG/1
TABLET ORAL
Qty: 21 TABLET | Refills: 0 | OUTPATIENT
Start: 2022-10-27 | End: 2022-11-14

## 2022-10-27 NOTE — PROGRESS NOTES
Patient: Iris Hyde  YOB: 1960 62 y.o. female  Medical Record Number: [id number]    Chief Complaints:   Chief Complaint   Patient presents with   • Left Hip - Follow-up   • Right Hip - Follow-up       History of Present Illness:Iris Hyde is a 62 y.o. female who presents with complaints of severe bilateral hip pain.  The patient was seen previously for bilateral hip pain, she has known history of osteoarthritis, she had been doing okay but starting about 2 months ago the pain worsened and is so severe she is having a difficult time walking now.  Right hip is worse than the left.  She is tried over-the-counter anti-inflammatories with minimal improvement      Allergies:   Allergies   Allergen Reactions   • Butorphanol Mental Status Change   • Butorphanol Tartrate Other (See Comments)     PSYCHOTIC   • Hydrocodone-Acetaminophen Itching     norco    • Cymbalta [Duloxetine Hcl] Anxiety   • Hydrocodone-Acetaminophen Rash       Medications:   Current Outpatient Medications   Medication Sig Dispense Refill   • albuterol sulfate  (90 Base) MCG/ACT inhaler Inhale 2 puffs Every 4 (Four) Hours As Needed for Wheezing or Shortness of Air. 18 g 0   • cyclobenzaprine (FLEXERIL) 10 MG tablet TAKE 1 TABLET BY MOUTH THREE TIMES DAILY 90 tablet 5   • fluticasone (Flonase) 50 MCG/ACT nasal spray 1 spray in each nostril twice daily 16 g 1   • levothyroxine (SYNTHROID, LEVOTHROID) 200 MCG tablet Take 1 tablet by mouth once daily 90 tablet 3   • Melatonin 10 MG tablet dispersible      • pantoprazole (Protonix) 40 MG EC tablet Take 1 tablet by mouth Daily. 30 tablet 11   • promethazine (PHENERGAN) 25 MG tablet Take 1 tablet by mouth Every 6 (Six) Hours As Needed for Nausea or Vomiting. 100 tablet 5   • sucralfate (Carafate) 1 g tablet Take 1 tablet by mouth 4 (Four) Times a Day. 120 tablet 5   • venlafaxine XR (EFFEXOR-XR) 150 MG 24 hr capsule Take 1 capsule by mouth Daily. 30 capsule 3   • azithromycin  "(Zithromax) 250 MG tablet Take 2 tablets the first day, then 1 tablet daily for 4 days. 6 tablet 0   • methylPREDNISolone (MEDROL) 4 MG dose pack Use as directed by package instructions 21 tablet 0     No current facility-administered medications for this visit.         The following portions of the patient's history were reviewed and updated as appropriate: allergies, current medications, past family history, past medical history, past social history, past surgical history and problem list.    Review of Systems:   A 14 point review of systems was performed. All systems negative except pertinent positives/negative listed in HPI above    Physical Exam:   Vitals:    10/27/22 0943   Temp: 97.6 °F (36.4 °C)   TempSrc: Temporal   Weight: (!) 140 kg (309 lb)   Height: 170.2 cm (67.01\")       General: A and O x 3, ASA, NAD    SCLERA:    Normal      Body mass index is 48.38 kg/m².  Skin clear no unusual lesions noted  Bilateral hips patient is nontender palpation she has severe pain with internal and external rotation with a positive Stinchfield positive logroll calf is soft and nontender         Radiology:  Xrays 2 views of bilateral hips were ordered and reviewed today secondary to pain show bone-on-bone end-stage osteoarthritis with cyst and spur formation.  Compared to views are unchanged    Assessment/Plan: End-stage osteoarthritis bilateral hips with increasing pain    The patient and I discussed options, unfortunately surgery is not an option at this point given her BMI but at some point she is definitely going to need to proceed with total hip replacement.  Instead we will try a Medrol Dosepak and bilateral hip fluoroscopy guided cortisone injections which she did well previously, I will otherwise see her back as needed      Pura Escobar, APRN  10/27/2022  "

## 2022-11-14 ENCOUNTER — APPOINTMENT (OUTPATIENT)
Dept: CT IMAGING | Facility: HOSPITAL | Age: 62
End: 2022-11-14

## 2022-11-14 ENCOUNTER — HOSPITAL ENCOUNTER (EMERGENCY)
Facility: HOSPITAL | Age: 62
Discharge: HOME | End: 2022-11-14
Attending: EMERGENCY MEDICINE | Admitting: EMERGENCY MEDICINE

## 2022-11-14 VITALS
SYSTOLIC BLOOD PRESSURE: 146 MMHG | TEMPERATURE: 97.6 F | HEART RATE: 63 BPM | DIASTOLIC BLOOD PRESSURE: 78 MMHG | RESPIRATION RATE: 18 BRPM | OXYGEN SATURATION: 98 %

## 2022-11-14 DIAGNOSIS — M16.0 OSTEOARTHRITIS OF BOTH HIPS, UNSPECIFIED OSTEOARTHRITIS TYPE: ICD-10-CM

## 2022-11-14 DIAGNOSIS — R10.9 NONSPECIFIC ABDOMINAL PAIN: Primary | ICD-10-CM

## 2022-11-14 DIAGNOSIS — N39.0 ACUTE UTI: ICD-10-CM

## 2022-11-14 LAB
ALBUMIN SERPL-MCNC: 4 G/DL (ref 3.5–5.2)
ALBUMIN/GLOB SERPL: 1.3 G/DL
ALP SERPL-CCNC: 119 U/L (ref 39–117)
ALT SERPL W P-5'-P-CCNC: 15 U/L (ref 1–33)
ANION GAP SERPL CALCULATED.3IONS-SCNC: 14 MMOL/L (ref 5–15)
AST SERPL-CCNC: 22 U/L (ref 1–32)
BACTERIA UR QL AUTO: ABNORMAL /HPF
BASOPHILS # BLD AUTO: 0.06 10*3/MM3 (ref 0–0.2)
BASOPHILS NFR BLD AUTO: 0.5 % (ref 0–1.5)
BILIRUB SERPL-MCNC: 0.2 MG/DL (ref 0–1.2)
BILIRUB UR QL STRIP: NEGATIVE
BUN SERPL-MCNC: 11 MG/DL (ref 8–23)
BUN/CREAT SERPL: 11.6 (ref 7–25)
CALCIUM SPEC-SCNC: 9 MG/DL (ref 8.6–10.5)
CHLORIDE SERPL-SCNC: 104 MMOL/L (ref 98–107)
CLARITY UR: ABNORMAL
CO2 SERPL-SCNC: 24 MMOL/L (ref 22–29)
COLOR UR: YELLOW
CREAT SERPL-MCNC: 0.95 MG/DL (ref 0.57–1)
D-LACTATE SERPL-SCNC: 0.9 MMOL/L (ref 0.5–2)
DEPRECATED RDW RBC AUTO: 47 FL (ref 37–54)
EGFRCR SERPLBLD CKD-EPI 2021: 68.3 ML/MIN/1.73
EOSINOPHIL # BLD AUTO: 0.27 10*3/MM3 (ref 0–0.4)
EOSINOPHIL NFR BLD AUTO: 2.1 % (ref 0.3–6.2)
ERYTHROCYTE [DISTWIDTH] IN BLOOD BY AUTOMATED COUNT: 13.2 % (ref 12.3–15.4)
GLOBULIN UR ELPH-MCNC: 3 GM/DL
GLUCOSE SERPL-MCNC: 101 MG/DL (ref 65–99)
GLUCOSE UR STRIP-MCNC: NEGATIVE MG/DL
HCT VFR BLD AUTO: 41.6 % (ref 34–46.6)
HGB BLD-MCNC: 14.1 G/DL (ref 12–15.9)
HGB UR QL STRIP.AUTO: ABNORMAL
HOLD SPECIMEN: NORMAL
HOLD SPECIMEN: NORMAL
HYALINE CASTS UR QL AUTO: ABNORMAL /LPF
IMM GRANULOCYTES # BLD AUTO: 0.05 10*3/MM3 (ref 0–0.05)
IMM GRANULOCYTES NFR BLD AUTO: 0.4 % (ref 0–0.5)
KETONES UR QL STRIP: ABNORMAL
LEUKOCYTE ESTERASE UR QL STRIP.AUTO: ABNORMAL
LIPASE SERPL-CCNC: 61 U/L (ref 13–60)
LYMPHOCYTES # BLD AUTO: 5.93 10*3/MM3 (ref 0.7–3.1)
LYMPHOCYTES NFR BLD AUTO: 46.1 % (ref 19.6–45.3)
MCH RBC QN AUTO: 32.6 PG (ref 26.6–33)
MCHC RBC AUTO-ENTMCNC: 33.9 G/DL (ref 31.5–35.7)
MCV RBC AUTO: 96.3 FL (ref 79–97)
MONOCYTES # BLD AUTO: 0.64 10*3/MM3 (ref 0.1–0.9)
MONOCYTES NFR BLD AUTO: 5 % (ref 5–12)
NEUTROPHILS NFR BLD AUTO: 45.9 % (ref 42.7–76)
NEUTROPHILS NFR BLD AUTO: 5.91 10*3/MM3 (ref 1.7–7)
NITRITE UR QL STRIP: NEGATIVE
NRBC BLD AUTO-RTO: 0 /100 WBC (ref 0–0.2)
PH UR STRIP.AUTO: 6 [PH] (ref 5–8)
PLATELET # BLD AUTO: 225 10*3/MM3 (ref 140–450)
PMV BLD AUTO: 10.5 FL (ref 6–12)
POTASSIUM SERPL-SCNC: 3.5 MMOL/L (ref 3.5–5.2)
PROT SERPL-MCNC: 7 G/DL (ref 6–8.5)
PROT UR QL STRIP: ABNORMAL
RBC # BLD AUTO: 4.32 10*6/MM3 (ref 3.77–5.28)
RBC # UR STRIP: ABNORMAL /HPF
REF LAB TEST METHOD: ABNORMAL
SODIUM SERPL-SCNC: 142 MMOL/L (ref 136–145)
SP GR UR STRIP: 1.02 (ref 1–1.03)
SQUAMOUS #/AREA URNS HPF: ABNORMAL /HPF
UROBILINOGEN UR QL STRIP: ABNORMAL
WBC # UR STRIP: ABNORMAL /HPF
WBC NRBC COR # BLD: 12.86 10*3/MM3 (ref 3.4–10.8)
WHOLE BLOOD HOLD COAG: NORMAL
WHOLE BLOOD HOLD SPECIMEN: NORMAL

## 2022-11-14 PROCEDURE — 85025 COMPLETE CBC W/AUTO DIFF WBC: CPT | Performed by: EMERGENCY MEDICINE

## 2022-11-14 PROCEDURE — 81001 URINALYSIS AUTO W/SCOPE: CPT | Performed by: EMERGENCY MEDICINE

## 2022-11-14 PROCEDURE — 74176 CT ABD & PELVIS W/O CONTRAST: CPT

## 2022-11-14 PROCEDURE — 96375 TX/PRO/DX INJ NEW DRUG ADDON: CPT

## 2022-11-14 PROCEDURE — 25010000002 KETOROLAC TROMETHAMINE PER 15 MG: Performed by: EMERGENCY MEDICINE

## 2022-11-14 PROCEDURE — 99283 EMERGENCY DEPT VISIT LOW MDM: CPT

## 2022-11-14 PROCEDURE — 83690 ASSAY OF LIPASE: CPT | Performed by: EMERGENCY MEDICINE

## 2022-11-14 PROCEDURE — 83605 ASSAY OF LACTIC ACID: CPT | Performed by: EMERGENCY MEDICINE

## 2022-11-14 PROCEDURE — 96374 THER/PROPH/DIAG INJ IV PUSH: CPT

## 2022-11-14 PROCEDURE — 80053 COMPREHEN METABOLIC PANEL: CPT | Performed by: EMERGENCY MEDICINE

## 2022-11-14 PROCEDURE — 25010000002 ONDANSETRON PER 1 MG: Performed by: EMERGENCY MEDICINE

## 2022-11-14 RX ORDER — SODIUM CHLORIDE 0.9 % (FLUSH) 0.9 %
10 SYRINGE (ML) INJECTION AS NEEDED
Status: DISCONTINUED | OUTPATIENT
Start: 2022-11-14 | End: 2022-11-14 | Stop reason: HOSPADM

## 2022-11-14 RX ORDER — PHENAZOPYRIDINE HYDROCHLORIDE 200 MG/1
200 TABLET, FILM COATED ORAL 3 TIMES DAILY PRN
Qty: 6 TABLET | Refills: 0 | Status: SHIPPED | OUTPATIENT
Start: 2022-11-14 | End: 2022-11-28

## 2022-11-14 RX ORDER — SULFAMETHOXAZOLE AND TRIMETHOPRIM 800; 160 MG/1; MG/1
1 TABLET ORAL 2 TIMES DAILY
Qty: 10 TABLET | Refills: 0 | Status: SHIPPED | OUTPATIENT
Start: 2022-11-14 | End: 2022-11-28

## 2022-11-14 RX ORDER — ONDANSETRON 2 MG/ML
4 INJECTION INTRAMUSCULAR; INTRAVENOUS ONCE
Status: COMPLETED | OUTPATIENT
Start: 2022-11-14 | End: 2022-11-14

## 2022-11-14 RX ORDER — KETOROLAC TROMETHAMINE 15 MG/ML
15 INJECTION, SOLUTION INTRAMUSCULAR; INTRAVENOUS ONCE
Status: COMPLETED | OUTPATIENT
Start: 2022-11-14 | End: 2022-11-14

## 2022-11-14 RX ADMIN — KETOROLAC TROMETHAMINE 15 MG: 15 INJECTION, SOLUTION INTRAMUSCULAR; INTRAVENOUS at 11:46

## 2022-11-14 RX ADMIN — ONDANSETRON 4 MG: 2 INJECTION INTRAMUSCULAR; INTRAVENOUS at 11:46

## 2022-11-14 NOTE — ED PROVIDER NOTES
EMERGENCY DEPARTMENT ENCOUNTER    Room Number:  26/26  Date of encounter:  11/14/2022  PCP: Provider, No Known  Historian: Patient      HPI:  Chief Complaint: Right lower quadrant pain, rated    A complete HPI/ROS/PMH/PSH/SH/FH are unobtainable due to: N/A    Context: Iris Hyde is a 62 y.o. female who presents to the ED c/o 3 weeks worth of right hip pain for the past few days similar to her right lower quadrant.  Pain is constant, does not radiate, severe at times, worsened with walking or internal/external rotation of her right hip, some relief with a heating pad.  No nausea, vomiting or diarrhea.  She has had difficulty emptying her bladder but no dysuria or hematuria.  No fevers or chills.    She saw her orthopedist at the onset of pain and has severe degenerative arthritis of both hips.  Scheduled for cortisone injection this week.    Patient has prior history of renal colic/stones and states this feels similar.      Summary of prior records: She has a history of arthritis, GERD, fibromyalgia, migraines, anxiety/depression, gastroparesis, ureterolithiasis and hypertension.  She smokes.  No recent ER visits or hospitalizations [facility].    The patient was placed in a mask in triage, hand hygiene was performed before and after my interaction with the patient.  I wore a mask, safety glasses and gloves during my entire interaction with the patient.    PAST MEDICAL HISTORY  Active Ambulatory Problems     Diagnosis Date Noted   • Arthritis 04/06/2016   • Acquired hypothyroidism 04/06/2016   • Tear of left rotator cuff 03/17/2017   • Elevated serum alkaline phosphatase level 06/14/2017   • Loss of hair 06/14/2017   • Asthmatic bronchitis 12/12/2013   • Back pain 06/14/2017   • Eczema 06/14/2017   • Gastroesophageal reflux disease 06/14/2017   • Fall 06/14/2017   • Fibromyalgia 06/14/2017   • Contusion of forehead 06/14/2017   • Headache 07/17/2013   • Hematuria 06/14/2017   • Incisional hernia  06/14/2017   • Migraine 06/14/2017   • Neoplasm of uncertain behavior of breast 06/14/2017   • Sleep apnea 06/14/2017   • Disorder of thyroid 06/14/2017   • Increased frequency of urination 06/14/2017   • Dysuria 06/14/2017   • Infection of urinary tract 06/14/2017   • Obesity, morbid, BMI 40.0-49.9 ([facility]) 06/14/2017   • Cigarette smoker 10/10/2017   • Mixed anxiety depressive disorder 07/20/2018   • Gastroparesis 07/23/2018   • Adenomatous polyps 07/23/2018   • Gastroesophageal reflux disease with esophagitis 07/23/2018   • Hx of adenomatous colonic polyps 08/22/2018   • Ureterolithiasis 12/06/2018   • Hydronephrosis with urinary obstruction due to ureteral calculus 12/06/2018   • Essential hypertension 03/21/2019     Resolved Ambulatory Problems     Diagnosis Date Noted   • Non-specific colitis 06/14/2017   • Nausea and vomiting 06/14/2017   • Noninfectious gastroenteritis 06/14/2017   • Sepsis, unspecified organism ([facility]) 12/06/2018     Past Medical History:   Diagnosis Date   • Alkaline phosphatase elevation    • Alopecia    • Anxiety    • Bladder spasms    • Cholelithiasis 1989   • Chronic low back pain    • Colitis    • Colon polyp 4 yrs   • Depression    • Diverticulitis of colon not sure   • Esophageal reflux    • Hernia not sure   • High risk medication use    • Hypertension not sure   • Lactose intolerance 5 yrs   • Migraine headache    • Skin cancer    • Sleep apnea with use of continuous positive airway pressure (CPAP)    • Thyroid disorder    • Urinary frequency    • Urinary pain          PAST SURGICAL HISTORY  Past Surgical History:   Procedure Laterality Date   • ABDOMINAL SURGERY  1980    Appendectomy/exp lap   • APPENDECTOMY  1980    DR. MATIAS   • CHOLECYSTECTOMY  1989    DR. IGOR FUNES   • COLONOSCOPY  09/20/2013    Two 8-9mm polyps in the rectum and in the sigmoid colon, 5mm polyp in the transverse colon, two 2-3mm polyps in the sigmoid colon, NBIH.  PATH: Tubular adenom, hyperplastic changes.     • COLONOSCOPY N/A 8/21/2018    diverticulosis, NBIH, mixed TA/hyperplastic polyps   • CRANIOTOMY  1996   • CYSTOSCOPY W/ URETERAL STENT PLACEMENT Right 12/6/2018    Procedure: CYSTO  RIGHT STENT RIGHT RETROGRADE;  Surgeon: Evin Glover MD;  Location: [facility];  Service: Urology   • ENDOSCOPY  08/29/2013    LA Grade A reflux esophagitis, HH, erosive gastritis, A single small papule with no stigmata of recent bleeding was found.  PATH:Benign.    • ENDOSCOPY N/A 8/21/2018    small hiatal hernia, gastritis, normal examined duodenum   • HYSTERECTOMY  2000   • TUBAL ABDOMINAL LIGATION  1982   • UPPER GASTROINTESTINAL ENDOSCOPY  8/21/2018         FAMILY HISTORY  Family History   Problem Relation Age of Onset   • Heart disease Other    • Diabetes Other    • Alcohol abuse Other    • Anxiety disorder Other    • Bipolar disorder Other    • Cancer Other    • Depression Other    • Lung disease Other    • Kidney disease Other    • Rheum arthritis Other    • Thyroid disease Other          SOCIAL HISTORY  Social History     Socioeconomic History   • Marital status:    Tobacco Use   • Smoking status: Every Day     Packs/day: 0.25     Years: 43.00     Pack years: 10.75     Types: Cigarettes   • Smokeless tobacco: Never   • Tobacco comments:     Began smoking age 14.  Smoked 1 ppd for 43 pack year history.   Substance and Sexual Activity   • Alcohol use: Yes     Comment: Social 2-3x/yr   • Drug use: Yes     Frequency: 3.0 times per week     Types: Marijuana     Comment: Daily for pain management   • Sexual activity: Never         ALLERGIES  Butorphanol, Butorphanol tartrate, Hydrocodone-acetaminophen, Cymbalta [duloxetine hcl], and Hydrocodone-acetaminophen        REVIEW OF SYSTEMS  Review of Systems   Constitutional: Negative for chills and fever.   Respiratory: Negative for shortness of breath.    Cardiovascular: Negative for chest pain.   Gastrointestinal: Positive for abdominal pain.   Genitourinary:  Positive for difficulty urinating. Negative for dysuria, flank pain and hematuria.   Musculoskeletal: Positive for gait problem.        All systems reviewed and negative except for those discussed in HPI.       PHYSICAL EXAM    I have reviewed the triage vital signs and nursing notes.    ED Triage Vitals [11/14/22 1024]   Temp Heart Rate Resp BP SpO2   97.6 °F (36.4 °C) 85 18 -- 98 %      Temp src Heart Rate Source Patient Position BP Location FiO2 (%)   -- -- -- -- --       Physical Exam   Constitutional: Pt. is oriented to person, place, and time and well-developed, well-nourished, and in no distress.   HENT: Normocephalic and atraumatic.   Neck: Normal range of motion. Neck supple. No JVD present.   Cardiovascular: Normal rate, regular rhythm and normal heart sounds. No murmur heard.  Pulmonary/Chest: Effort normal and breath sounds normal. No stridor. No respiratory distress. No wheezes, no rales.   Abdominal: Soft, obese. No distension. There is no tenderness. There is no rebound and no guarding.  No inguinal hernia are appreciated.  Musculoskeletal: Normal range of motion. No edema, tenderness or deformity.  She has right inguinal pain with internal/external rotation of the right hip.  Neurological: Pt. is alert and oriented to person, place, and time.  She has no focal neurologic deficits  Skin: Skin is warm and dry. No rash noted. Pt. is not diaphoretic. No erythema.   Psychiatric: Mood, affect and judgment normal.  She is pleasant and cooperative.  Nursing note and vitals reviewed.        LAB RESULTS  Recent Results (from the past 24 hour(s))   Comprehensive Metabolic Panel    Collection Time: 11/14/22 11:39 AM    Specimen: Blood   Result Value Ref Range    Glucose 101 (H) 65 - 99 mg/dL    BUN 11 8 - 23 mg/dL    Creatinine 0.95 0.57 - 1.00 mg/dL    Sodium 142 136 - 145 mmol/L    Potassium 3.5 3.5 - 5.2 mmol/L    Chloride 104 98 - 107 mmol/L    CO2 24.0 22.0 - 29.0 mmol/L    Calcium 9.0 8.6 - 10.5 mg/dL     Total Protein 7.0 6.0 - 8.5 g/dL    Albumin 4.00 3.50 - 5.20 g/dL    ALT (SGPT) 15 1 - 33 U/L    AST (SGOT) 22 1 - 32 U/L    Alkaline Phosphatase 119 (H) 39 - 117 U/L    Total Bilirubin 0.2 0.0 - 1.2 mg/dL    Globulin 3.0 gm/dL    A/G Ratio 1.3 g/dL    BUN/Creatinine Ratio 11.6 7.0 - 25.0    Anion Gap 14.0 5.0 - 15.0 mmol/L    eGFR 68.3 >60.0 mL/min/1.73   Lipase    Collection Time: 11/14/22 11:39 AM    Specimen: Blood   Result Value Ref Range    Lipase 61 (H) 13 - 60 U/L   Lactic Acid, Plasma    Collection Time: 11/14/22 11:39 AM    Specimen: Blood   Result Value Ref Range    Lactate 0.9 0.5 - 2.0 mmol/L   Green Top (Gel)    Collection Time: 11/14/22 11:39 AM   Result Value Ref Range    Extra Tube Hold for add-ons.    Lavender Top    Collection Time: 11/14/22 11:39 AM   Result Value Ref Range    Extra Tube hold for add-on    Gold Top - SST    Collection Time: 11/14/22 11:39 AM   Result Value Ref Range    Extra Tube Hold for add-ons.    Light Blue Top    Collection Time: 11/14/22 11:39 AM   Result Value Ref Range    Extra Tube Hold for add-ons.    CBC Auto Differential    Collection Time: 11/14/22 11:39 AM    Specimen: Blood   Result Value Ref Range    WBC 12.86 (H) 3.40 - 10.80 10*3/mm3    RBC 4.32 3.77 - 5.28 10*6/mm3    Hemoglobin 14.1 12.0 - 15.9 g/dL    Hematocrit 41.6 34.0 - 46.6 %    MCV 96.3 79.0 - 97.0 fL    MCH 32.6 26.6 - 33.0 pg    MCHC 33.9 31.5 - 35.7 g/dL    RDW 13.2 12.3 - 15.4 %    RDW-SD 47.0 37.0 - 54.0 fl    MPV 10.5 6.0 - 12.0 fL    Platelets 225 140 - 450 10*3/mm3    Neutrophil % 45.9 42.7 - 76.0 %    Lymphocyte % 46.1 (H) 19.6 - 45.3 %    Monocyte % 5.0 5.0 - 12.0 %    Eosinophil % 2.1 0.3 - 6.2 %    Basophil % 0.5 0.0 - 1.5 %    Immature Grans % 0.4 0.0 - 0.5 %    Neutrophils, Absolute 5.91 1.70 - 7.00 10*3/mm3    Lymphocytes, Absolute 5.93 (H) 0.70 - 3.10 10*3/mm3    Monocytes, Absolute 0.64 0.10 - 0.90 10*3/mm3    Eosinophils, Absolute 0.27 0.00 - 0.40 10*3/mm3    Basophils, Absolute 0.06  0.00 - 0.20 10*3/mm3    Immature Grans, Absolute 0.05 0.00 - 0.05 10*3/mm3    nRBC 0.0 0.0 - 0.2 /100 WBC   Urinalysis With Microscopic If Indicated (No Culture) - Urine, Clean Catch    Collection Time: 11/14/22 11:41 AM    Specimen: Urine, Clean Catch   Result Value Ref Range    Color, UA Yellow Yellow, Straw    Appearance, UA Turbid (A) Clear    pH, UA 6.0 5.0 - 8.0    Specific Gravity, UA 1.025 1.005 - 1.030    Glucose, UA Negative Negative    Ketones, UA Trace (A) Negative    Bilirubin, UA Negative Negative    Blood, UA Small (1+) (A) Negative    Protein, UA 30 mg/dL (1+) (A) Negative    Leuk Esterase, UA Small (1+) (A) Negative    Nitrite, UA Negative Negative    Urobilinogen, UA 1.0 E.U./dL 0.2 - 1.0 E.U./dL   Urinalysis, Microscopic Only - Urine, Clean Catch    Collection Time: 11/14/22 11:41 AM    Specimen: Urine, Clean Catch   Result Value Ref Range    RBC, UA 3-5 (A) None Seen, 0-2 /HPF    WBC, UA 6-12 (A) None Seen, 0-2 /HPF    Bacteria, UA 4+ (A) None Seen /HPF    Squamous Epithelial Cells, UA None Seen None Seen, 0-2 /HPF    Hyaline Casts, UA None Seen None Seen /LPF    Methodology Manual Light Microscopy        Ordered the above labs and independently reviewed the results.        RADIOLOGY  No Radiology Exams Resulted Within Past 24 Hours    I ordered the above noted radiological studies. Reviewed by me and discussed with radiologist.  See dictation for official radiology interpretation.      PROCEDURES    Procedures      MEDICATIONS GIVEN IN ER    Medications   sodium chloride 0.9 % flush 10 mL (has no administration in time range)   ketorolac (TORADOL) injection 15 mg (15 mg Intravenous Given 11/14/22 1146)   ondansetron (ZOFRAN) injection 4 mg (4 mg Intravenous Given 11/14/22 1146)         PROGRESS, DATA ANALYSIS, CONSULTS, AND MEDICAL DECISION MAKING    Any/all labs have been independently reviewed by me.  Any/all radiology studies have been reviewed by me and discussed with radiologist dictating  the report.   EKG's independently viewed and interpreted by me.  Discussion below represents my analysis of pertinent findings related to patient's condition, differential diagnosis, treatment plan and final disposition.    Number of Diagnoses or Management Options     Amount and/or Complexity of Data Reviewed  Clinical lab tests: Yes  Tests in the radiology section of CPT®: Yes  Tests in the medicine section of CPT®: Yes  Review and summarize past medical records:  (Yes - see HPI)  Independent visualization of images, tracings, or specimens: (Yes - see below)      ED Course as of 11/14/22 1342   Mon Nov 14, 2022   1243 Abd Pain MDM includes but is not limited to: Cholecystitis, choledocholithiasis, cholangitis, peritonitis, pancreatitis/pseudocyst, peptic ulcer, bowel obstruction/ileus, constipation, diverticulitis/perforation/abscess, inflammatory bowel disease, renal colic/stone, urinary tract infection/pyelonephritis, prostatitis, mesenteric ischemia, expanding/leaking AAA, testicular/ovarian torsion. [WC]   1337 CT of the abdomen and pelvis was independently viewed by me and discussed with/interpreted by Dr. Mathew (radiology)-there are no acute processes identified.  For official interpretation, see dictated report. [WC]   1340 Discussed all lab and/or imaging with the patient.  The patient's abdominal exam has improved.  I explained that the patient should return to the emergency department should the pain recur or for any new symptoms (fever, blood in emesis/stool).  I also advised the patient to follow up with their PMD for further evaluation.  There is nothing on workup to suggest appendicitis, diverticulitis, cholecystitis, pancreatitis, peritonitis, mesenteric ischemia, ovarian/testicular torsion, ureteral stones or any other emergent intra-abdominal process.  My clinical suspicion for serious intra-abdominal pathology is low, and the patient can be safely discharged home for outpatient follow up.    [WC]      ED Course User Index  [WC] Cristian Escalera MD       AS OF 13:42 EST VITALS:    BP - 146/78  HR - 63  TEMP - 97.6 °F (36.4 °C)  02 SATS - 97%        DIAGNOSIS  Final diagnoses:   Nonspecific abdominal pain   Acute UTI   Osteoarthritis of both hips, unspecified osteoarthritis type         DISPOSITION  Discharged          Note Disclaimer: At [facility], we believe that sharing information builds trust and better relationships. You are receiving this note because you recently visited [facility]. It is possible you will see health information before a provider has talked with you about it. This kind of information can be easy to misunderstand. To help you fully understand what it means for your health, we urge you to discuss this note with your provider.\         Cristian Escalera MD  11/14/22 0989

## 2022-11-14 NOTE — ED TRIAGE NOTES
Patient to ER via car from home for abd pain and groin pain x 3 weeks    Patient has hx of kidney stone, patient states she can not empty her bladder completly    Patient wearing mask this RN in PPE

## 2022-11-17 ENCOUNTER — APPOINTMENT (OUTPATIENT)
Dept: GENERAL RADIOLOGY | Facility: HOSPITAL | Age: 62
End: 2022-11-17

## 2022-11-17 ENCOUNTER — HOSPITAL ENCOUNTER (OUTPATIENT)
Dept: GENERAL RADIOLOGY | Facility: HOSPITAL | Age: 62
Discharge: HOME | End: 2022-11-17
Admitting: NURSE PRACTITIONER

## 2022-11-17 DIAGNOSIS — M25.551 RIGHT HIP PAIN: ICD-10-CM

## 2022-11-17 DIAGNOSIS — M25.552 LEFT HIP PAIN: ICD-10-CM

## 2022-11-17 PROCEDURE — 77002 NEEDLE LOCALIZATION BY XRAY: CPT

## 2022-11-17 PROCEDURE — 25010000002 METHYLPREDNISOLONE PER 125 MG: Performed by: NURSE PRACTITIONER

## 2022-11-17 PROCEDURE — 25010000002 IOPAMIDOL 61 % SOLUTION: Performed by: NURSE PRACTITIONER

## 2022-11-17 PROCEDURE — 0 LIDOCAINE 1 % SOLUTION: Performed by: NURSE PRACTITIONER

## 2022-11-17 RX ORDER — METHYLPREDNISOLONE SODIUM SUCCINATE 125 MG/2ML
80 INJECTION, POWDER, LYOPHILIZED, FOR SOLUTION INTRAMUSCULAR; INTRAVENOUS 2 TIMES DAILY PRN
Status: DISCONTINUED | OUTPATIENT
Start: 2022-11-17 | End: 2022-11-18 | Stop reason: HOSPADM

## 2022-11-17 RX ORDER — BUPIVACAINE HYDROCHLORIDE 2.5 MG/ML
10 INJECTION, SOLUTION EPIDURAL; INFILTRATION; INTRACAUDAL 2 TIMES DAILY PRN
Status: DISCONTINUED | OUTPATIENT
Start: 2022-11-17 | End: 2022-11-18 | Stop reason: HOSPADM

## 2022-11-17 RX ORDER — LIDOCAINE HYDROCHLORIDE 10 MG/ML
10 INJECTION, SOLUTION INFILTRATION; PERINEURAL 2 TIMES DAILY PRN
Status: DISCONTINUED | OUTPATIENT
Start: 2022-11-17 | End: 2022-11-18 | Stop reason: HOSPADM

## 2022-11-17 RX ADMIN — BUPIVACAINE HYDROCHLORIDE 4 ML: 2.5 INJECTION, SOLUTION EPIDURAL; INFILTRATION; INTRACAUDAL; PERINEURAL at 11:11

## 2022-11-17 RX ADMIN — METHYLPREDNISOLONE SODIUM SUCCINATE 80 MG: 125 INJECTION, POWDER, LYOPHILIZED, FOR SOLUTION INTRAMUSCULAR; INTRAVENOUS at 10:52

## 2022-11-17 RX ADMIN — LIDOCAINE HYDROCHLORIDE 3 ML: 10 INJECTION, SOLUTION INFILTRATION; PERINEURAL at 10:52

## 2022-11-17 RX ADMIN — IOPAMIDOL 1 ML: 612 INJECTION, SOLUTION INTRAVENOUS at 10:52

## 2022-11-17 RX ADMIN — IOPAMIDOL 1 ML: 612 INJECTION, SOLUTION INTRAVENOUS at 11:11

## 2022-11-17 RX ADMIN — METHYLPREDNISOLONE SODIUM SUCCINATE 80 MG: 125 INJECTION, POWDER, LYOPHILIZED, FOR SOLUTION INTRAMUSCULAR; INTRAVENOUS at 11:11

## 2022-11-17 RX ADMIN — BUPIVACAINE HYDROCHLORIDE 5 ML: 2.5 INJECTION, SOLUTION EPIDURAL; INFILTRATION; INTRACAUDAL; PERINEURAL at 10:52

## 2022-11-17 RX ADMIN — LIDOCAINE HYDROCHLORIDE 2 ML: 10 INJECTION, SOLUTION INFILTRATION; PERINEURAL at 11:11

## 2022-11-28 ENCOUNTER — OFFICE VISIT (OUTPATIENT)
Dept: FAMILY MEDICINE CLINIC | Facility: CLINIC | Age: 62
End: 2022-11-28

## 2022-11-28 VITALS
HEART RATE: 67 BPM | DIASTOLIC BLOOD PRESSURE: 90 MMHG | WEIGHT: 293 LBS | HEIGHT: 67 IN | OXYGEN SATURATION: 99 % | RESPIRATION RATE: 20 BRPM | SYSTOLIC BLOOD PRESSURE: 124 MMHG | TEMPERATURE: 96.8 F | BODY MASS INDEX: 45.99 KG/M2

## 2022-11-28 DIAGNOSIS — K31.84 GASTROPARESIS: ICD-10-CM

## 2022-11-28 DIAGNOSIS — R30.0 DYSURIA: Primary | ICD-10-CM

## 2022-11-28 DIAGNOSIS — J44.9 CHRONIC OBSTRUCTIVE PULMONARY DISEASE, UNSPECIFIED COPD TYPE: ICD-10-CM

## 2022-11-28 DIAGNOSIS — E03.9 ACQUIRED HYPOTHYROIDISM: ICD-10-CM

## 2022-11-28 DIAGNOSIS — R93.3 ABNORMAL COLONOSCOPY: ICD-10-CM

## 2022-11-28 DIAGNOSIS — F41.8 MIXED ANXIETY DEPRESSIVE DISORDER: ICD-10-CM

## 2022-11-28 DIAGNOSIS — Z23 NEED FOR INFLUENZA VACCINATION: ICD-10-CM

## 2022-11-28 DIAGNOSIS — M79.7 FIBROMYALGIA: ICD-10-CM

## 2022-11-28 LAB
BILIRUB BLD-MCNC: NEGATIVE MG/DL
CLARITY, POC: CLEAR
COLOR UR: YELLOW
EXPIRATION DATE: NORMAL
GLUCOSE UR STRIP-MCNC: NEGATIVE MG/DL
KETONES UR QL: NEGATIVE
LEUKOCYTE EST, POC: NEGATIVE
Lab: NORMAL
NITRITE UR-MCNC: NEGATIVE MG/ML
PH UR: 6 [PH] (ref 5–8)
PROT UR STRIP-MCNC: NEGATIVE MG/DL
RBC # UR STRIP: NEGATIVE /UL
SP GR UR: 1.01 (ref 1–1.03)
UROBILINOGEN UR QL: NORMAL

## 2022-11-28 PROCEDURE — 99215 OFFICE O/P EST HI 40 MIN: CPT | Performed by: NURSE PRACTITIONER

## 2022-11-28 PROCEDURE — 81003 URINALYSIS AUTO W/O SCOPE: CPT | Performed by: NURSE PRACTITIONER

## 2022-11-28 PROCEDURE — 90471 IMMUNIZATION ADMIN: CPT | Performed by: NURSE PRACTITIONER

## 2022-11-28 PROCEDURE — 90686 IIV4 VACC NO PRSV 0.5 ML IM: CPT | Performed by: NURSE PRACTITIONER

## 2022-11-28 RX ORDER — PROMETHAZINE HYDROCHLORIDE 25 MG/1
25 TABLET ORAL EVERY 6 HOURS PRN
Qty: 90 TABLET | Refills: 2 | Status: SHIPPED | OUTPATIENT
Start: 2022-11-28

## 2022-11-28 RX ORDER — PANTOPRAZOLE SODIUM 40 MG/1
40 TABLET, DELAYED RELEASE ORAL DAILY
Qty: 30 TABLET | Refills: 2 | Status: SHIPPED | OUTPATIENT
Start: 2022-11-28

## 2022-11-28 RX ORDER — PHENAZOPYRIDINE HYDROCHLORIDE 200 MG/1
200 TABLET, FILM COATED ORAL 3 TIMES DAILY PRN
Qty: 6 TABLET | Refills: 0 | Status: SHIPPED | OUTPATIENT
Start: 2022-11-28

## 2022-11-28 RX ORDER — ALBUTEROL SULFATE 90 UG/1
2 AEROSOL, METERED RESPIRATORY (INHALATION) EVERY 4 HOURS PRN
Qty: 18 G | Refills: 1 | Status: SHIPPED | OUTPATIENT
Start: 2022-11-28

## 2022-11-28 RX ORDER — CYCLOBENZAPRINE HCL 10 MG
10 TABLET ORAL 3 TIMES DAILY PRN
Qty: 90 TABLET | Refills: 3 | Status: SHIPPED | OUTPATIENT
Start: 2022-11-28

## 2022-11-28 RX ORDER — LEVOTHYROXINE SODIUM 0.2 MG/1
200 TABLET ORAL DAILY
Qty: 90 TABLET | Refills: 3 | Status: SHIPPED | OUTPATIENT
Start: 2022-11-28

## 2022-11-28 RX ORDER — VENLAFAXINE HYDROCHLORIDE 150 MG/1
150 CAPSULE, EXTENDED RELEASE ORAL DAILY
Qty: 30 CAPSULE | Refills: 3 | Status: SHIPPED | OUTPATIENT
Start: 2022-11-28

## 2022-11-28 NOTE — PATIENT INSTRUCTIONS
Discussed with patient to followup with Dr. Vincent for bladder spasms.    Discussed with patient to follow up with Dr. De Leon for new sleep study as C-pap machine is broken and patient has not used machine in a while and recommended sleep study.

## 2022-11-28 NOTE — PROGRESS NOTES
Chief Complaint  Establish Care, Hypothyroidism, Osteoarthritis, Med Refill, Anxiety, Depression, and COPD     Subjective          Iris Hyde presents to [facility]  Hypothyroidism  This is a chronic problem. The current episode started more than 1 year ago. Pertinent negatives include no chest pain, coughing or joint swelling. Improvement on treatment: Been off medication for about a month.   Osteoarthritis  Presents for follow-up visit. She complains of pain. She reports no stiffness, joint swelling or joint warmth. The symptoms have been stable. Affected locations include the right hip and left hip. Her pain is at a severity of 4/10. Her past medical history is significant for osteoarthritis.   Anxiety  Presents for follow-up visit. Patient reports no chest pain, decreased concentration, depressed mood, insomnia, palpitations, restlessness, shortness of breath or suicidal ideas. Symptoms occur occasionally. The quality of sleep is good.     Her past medical history is significant for depression.   Depression  Visit Type: follow-up  Patient is not experiencing: chest pain, decreased concentration, depressed mood, insomnia, palpitations, restlessness, shortness of breath and suicidal ideas.  Frequency of symptoms: occasionally   Sleep quality: good      COPD  There is no chest tightness, cough, shortness of breath or wheezing. This is a chronic problem. Pertinent negatives include no chest pain or dyspnea on exertion. Her symptoms are aggravated by nothing. Her symptoms are alleviated by steroid inhaler. She reports moderate improvement on treatment. Risk factors for lung disease include smoking/tobacco exposure. Her past medical history is significant for bronchitis and COPD.       Review of Systems   Respiratory: Negative for cough, shortness of breath and wheezing.    Cardiovascular: Negative for chest pain, dyspnea on exertion and palpitations.   Musculoskeletal: Negative for  "joint swelling and stiffness.   Psychiatric/Behavioral: Negative for decreased concentration and suicidal ideas. The patient does not have insomnia.          Objective   Vital Signs:   /90 (BP Location: Left arm)   Pulse 67   Temp 96.8 °F (36 °C) (Temporal)   Resp 20   Ht 170.2 cm (67.01\")   Wt (!) 142 kg (313 lb)   SpO2 99%   BMI 49.01 kg/m²     Physical Exam   Result Review :   The following data was reviewed by: YAYA Morgan on 11/28/2022:  CMP    CMP 3/18/22 11/14/22   Glucose 115 (A) 101 (A)   BUN 10 11   Creatinine 0.88 0.95   Sodium 141 142   Potassium 4.1 3.5   Chloride 103 104   Calcium 9.8 9.0   Total Protein 7.0    Albumin 4.3 4.00   Globulin 2.7    Total Bilirubin <0.2 0.2   Alkaline Phosphatase 148 (A) 119 (A)   AST (SGOT) 23 22   ALT (SGPT) 18 15   (A) Abnormal value            CBC w/diff    CBC w/Diff 3/18/22 11/14/22   WBC 9.0 12.86 (A)   RBC 4.52 4.32   Hemoglobin 14.4 14.1   Hematocrit 42.6 41.6   MCV 94 96.3   MCH 31.9 32.6   MCHC 33.8 33.9   RDW 13.3 13.2   Platelets 232 225   Neutrophil Rel % 38 45.9   Immature Granulocyte Rel %  0.4   Lymphocyte Rel % 53 46.1 (A)   Monocyte Rel % 6 5.0   Eosinophil Rel % 2 2.1   Basophil Rel % 1 0.5   (A) Abnormal value                TSH    TSH 3/18/22   TSH 19.400 (A)   (A) Abnormal value            Data reviewed: GI studies abnormal colonoscopy from 2018          Assessment and Plan    Diagnoses and all orders for this visit:    1. Dysuria (Primary)  Comments:  Patient followed by Dr. Glover r/t chronic kidney issues r/t kidney stones; recommended patient f/u with Dr. Glover for chronic bladder spasms.  Orders:  -     POCT urinalysis dipstick, automated  -     CBC & Differential    2. Chronic obstructive pulmonary disease, unspecified COPD type (HCC)  Comments:  Patient followed by Dr. De Leon for COPD and SUDEEP;  Recommended patient f/u with Dr. De Leon for sleep study and C-pap machine  Orders:  -     albuterol sulfate  (90 Base) " MCG/ACT inhaler; Inhale 2 puffs Every 4 (Four) Hours As Needed for Wheezing or Shortness of Air.  Dispense: 18 g; Refill: 1  -     CBC & Differential    3. Fibromyalgia  -     cyclobenzaprine (FLEXERIL) 10 MG tablet; Take 1 tablet by mouth 3 (Three) Times a Day As Needed for Muscle Spasms.  Dispense: 90 tablet; Refill: 3  -     CBC & Differential    4. Acquired hypothyroidism  -     levothyroxine (SYNTHROID, LEVOTHROID) 200 MCG tablet; Take 1 tablet by mouth Daily. Take with 8 onces of water 1 hour before meals/medications.  Dispense: 90 tablet; Refill: 3  -     CBC & Differential  -     TSH+Free T4    5. Gastroparesis  -     pantoprazole (Protonix) 40 MG EC tablet; Take 1 tablet by mouth Daily.  Dispense: 30 tablet; Refill: 2  -     CBC & Differential    6. Mixed anxiety depressive disorder  -     venlafaxine XR (EFFEXOR-XR) 150 MG 24 hr capsule; Take 1 capsule by mouth Daily.  Dispense: 30 capsule; Refill: 3  -     CBC & Differential    7. Body mass index (BMI) of 45.0 to 49.9 in adult (HCC)  -     Lipid Panel    8. Abnormal colonoscopy  -     Ambulatory Referral For Screening Colonoscopy    9. Need for influenza vaccination  -     FluLaval/Fluarix/Fluzone >6 Months    Other orders  -     diclofenac (VOLTAREN) 50 MG EC tablet; Take 1 tablet by mouth 3 (Three) Times a Day As Needed (Osteoarthritis).  Dispense: 90 tablet; Refill: 2  -     phenazopyridine (PYRIDIUM) 200 MG tablet; Take 1 tablet by mouth 3 (Three) Times a Day As Needed for Bladder Spasms. Patient to make appointment to see urology.  No refills.  Dispense: 6 tablet; Refill: 0  -     promethazine (PHENERGAN) 25 MG tablet; Take 1 tablet by mouth Every 6 (Six) Hours As Needed for Nausea or Vomiting (Gastroparesis).  Dispense: 90 tablet; Refill: 2      I spent 45 minutes caring for Iris on this date of service. This time includes time spent by me in the following activities:preparing for the visit, reviewing tests, performing a medically appropriate  examination and/or evaluation , ordering medications, tests, or procedures, documenting information in the medical record and independently interpreting results and communicating that information with the patient/family/caregiver  Follow Up   Return in about 6 weeks (around 1/9/2023) for Recheck TSH lab; also follow-up in 3 months for chronic conditions, Recheck.  Patient was given instructions and counseling regarding her condition or for health maintenance advice. Please see specific information pulled into the AVS if appropriate.

## 2022-11-29 DIAGNOSIS — E03.9 ACQUIRED HYPOTHYROIDISM: Primary | ICD-10-CM

## 2022-11-29 LAB
BASOPHILS # BLD AUTO: 0.06 10*3/MM3 (ref 0–0.2)
BASOPHILS NFR BLD AUTO: 0.6 % (ref 0–1.5)
CHOLEST SERPL-MCNC: 225 MG/DL (ref 0–200)
EOSINOPHIL # BLD AUTO: 0.2 10*3/MM3 (ref 0–0.4)
EOSINOPHIL NFR BLD AUTO: 1.8 % (ref 0.3–6.2)
ERYTHROCYTE [DISTWIDTH] IN BLOOD BY AUTOMATED COUNT: 13 % (ref 12.3–15.4)
HCT VFR BLD AUTO: 42.7 % (ref 34–46.6)
HDLC SERPL-MCNC: 56 MG/DL (ref 40–60)
HGB BLD-MCNC: 14.4 G/DL (ref 12–15.9)
IMM GRANULOCYTES # BLD AUTO: 0.03 10*3/MM3 (ref 0–0.05)
IMM GRANULOCYTES NFR BLD AUTO: 0.3 % (ref 0–0.5)
LDLC SERPL CALC-MCNC: 144 MG/DL (ref 0–100)
LYMPHOCYTES # BLD AUTO: 5.78 10*3/MM3 (ref 0.7–3.1)
LYMPHOCYTES NFR BLD AUTO: 53 % (ref 19.6–45.3)
MCH RBC QN AUTO: 32.7 PG (ref 26.6–33)
MCHC RBC AUTO-ENTMCNC: 33.7 G/DL (ref 31.5–35.7)
MCV RBC AUTO: 96.8 FL (ref 79–97)
MONOCYTES # BLD AUTO: 0.7 10*3/MM3 (ref 0.1–0.9)
MONOCYTES NFR BLD AUTO: 6.4 % (ref 5–12)
NEUTROPHILS # BLD AUTO: 4.13 10*3/MM3 (ref 1.7–7)
NEUTROPHILS NFR BLD AUTO: 37.9 % (ref 42.7–76)
PLATELET # BLD AUTO: 230 10*3/MM3 (ref 140–450)
RBC # BLD AUTO: 4.41 10*6/MM3 (ref 3.77–5.28)
T4 FREE SERPL-MCNC: 0.65 NG/DL (ref 0.93–1.7)
TRIGL SERPL-MCNC: 141 MG/DL (ref 0–150)
TSH SERPL DL<=0.005 MIU/L-ACNC: 43 UIU/ML (ref 0.27–4.2)
VLDLC SERPL CALC-MCNC: 25 MG/DL (ref 5–40)
WBC # BLD AUTO: 10.9 10*3/MM3 (ref 3.4–10.8)

## 2022-12-02 ENCOUNTER — PATIENT ROUNDING (BHMG ONLY) (OUTPATIENT)
Dept: FAMILY MEDICINE CLINIC | Facility: CLINIC | Age: 62
End: 2022-12-02

## 2022-12-02 NOTE — PROGRESS NOTES
A [employer] message has been sent to patient rounding with [facility].

## 2023-01-05 DIAGNOSIS — E03.9 ACQUIRED HYPOTHYROIDISM: ICD-10-CM

## 2023-01-16 ENCOUNTER — OFFICE VISIT (OUTPATIENT)
Dept: ORTHOPEDIC SURGERY | Facility: CLINIC | Age: 63
End: 2023-01-16
Payer: COMMERCIAL

## 2023-01-16 VITALS — HEIGHT: 67 IN | TEMPERATURE: 98.6 F | WEIGHT: 293 LBS | BODY MASS INDEX: 45.99 KG/M2

## 2023-01-16 DIAGNOSIS — M16.0 PRIMARY OSTEOARTHRITIS OF BOTH HIPS: Primary | ICD-10-CM

## 2023-01-16 PROCEDURE — 99214 OFFICE O/P EST MOD 30 MIN: CPT | Performed by: NURSE PRACTITIONER

## 2023-01-16 PROCEDURE — 73522 X-RAY EXAM HIPS BI 3-4 VIEWS: CPT | Performed by: NURSE PRACTITIONER

## 2023-01-16 RX ORDER — METHYLPREDNISOLONE 4 MG/1
TABLET ORAL
Qty: 21 TABLET | Refills: 0 | Status: SHIPPED | OUTPATIENT
Start: 2023-01-16

## 2023-01-16 NOTE — PROGRESS NOTES
"Patient: Iris Hyde  YOB: 1960 62 y.o. female  Medical Record Number: [id number]    Chief Complaints:   Chief Complaint   Patient presents with   • Left Hip - Follow-up, Pain   • Right Hip - Follow-up, Pain       History of Present Illness:Iris Hyde is a 62 y.o. female who presents with complaints of severe bilateral hip pain.  Patient has known history of bone-on-bone end-stage osteoarthritis she reports that the pain has become\" unbearable\" apparently her right hip gave out at work on November 18, that was 1 day following bilateral hip fluoroscopy guided cortisone injections which have been doing great until that incident.  Both hips have worsened since that time and she is having a difficult time walking and is now having to use a cane.    Allergies:   Allergies   Allergen Reactions   • Butorphanol Mental Status Change   • Butorphanol Tartrate Other (See Comments)     PSYCHOTIC   • Hydrocodone-Acetaminophen Itching     norco    • Cymbalta [Duloxetine Hcl] Anxiety   • Hydrocodone-Acetaminophen Rash       Medications:   Current Outpatient Medications   Medication Sig Dispense Refill   • albuterol sulfate  (90 Base) MCG/ACT inhaler Inhale 2 puffs Every 4 (Four) Hours As Needed for Wheezing or Shortness of Air. 18 g 1   • cyclobenzaprine (FLEXERIL) 10 MG tablet Take 1 tablet by mouth 3 (Three) Times a Day As Needed for Muscle Spasms. 90 tablet 3   • diclofenac (VOLTAREN) 50 MG EC tablet Take 1 tablet by mouth 3 (Three) Times a Day As Needed (Osteoarthritis). 90 tablet 2   • fluticasone (Flonase) 50 MCG/ACT nasal spray 1 spray in each nostril twice daily 16 g 1   • levothyroxine (SYNTHROID, LEVOTHROID) 200 MCG tablet Take 1 tablet by mouth Daily. Take with 8 onces of water 1 hour before meals/medications. 90 tablet 3   • Melatonin 10 MG tablet dispersible      • pantoprazole (Protonix) 40 MG EC tablet Take 1 tablet by mouth Daily. 30 tablet 2   • phenazopyridine (PYRIDIUM) 200 MG tablet " "Take 1 tablet by mouth 3 (Three) Times a Day As Needed for Bladder Spasms. Patient to make appointment to see urology.  No refills. 6 tablet 0   • promethazine (PHENERGAN) 25 MG tablet Take 1 tablet by mouth Every 6 (Six) Hours As Needed for Nausea or Vomiting (Gastroparesis). 90 tablet 2   • venlafaxine XR (EFFEXOR-XR) 150 MG 24 hr capsule Take 1 capsule by mouth Daily. 30 capsule 3     No current facility-administered medications for this visit.         The following portions of the patient's history were reviewed and updated as appropriate: allergies, current medications, past family history, past medical history, past social history, past surgical history and problem list.    Review of Systems:   A 14 point review of systems was performed. All systems negative except pertinent positives/negative listed in HPI above    Physical Exam:   Vitals:    01/16/23 1120   Temp: 98.6 °F (37 °C)   TempSrc: Temporal   Weight: (!) 142 kg (313 lb)   Height: 170.2 cm (67.01\")   PainSc:   7   PainLoc: Hip       General: A and O x 3, ASA, NAD    SCLERA:    Normal    Skin clear no unusual lesions noted  Bilateral hips patient is nontender palpation she has severe pain with internal ex rotation with a positive Stinchfield positive logroll calf is soft and nontender  Body mass index is 49.01 kg/m².         Radiology:  Xrays 2 views of bilateral hips were ordered and reviewed today secondary to increasing pain show bone-on-bone end-stage osteoarthritis with cyst and spur formation.  Compared to views show definite progression in arthritic changes    Assessment/Plan: End-stage osteoarthritis bilateral hips with increasing pain    Patient and I discussed options, unfortunately her BMI is greater than 49 so we would not be able to proceed with surgery at this point.  She does need total hip replacement surgery because of the severe osteoarthritis and pain we are not able to proceed.  Instead we will try a Medrol Dosepak, she will work on " weight loss we have set a 60 pound weight loss goal and the patient will follow with me in a couple months, hopefully things will improve and we can at some point get her scheduled for surgery.      Pura Escobar, APRN  1/16/2023

## 2023-03-07 ENCOUNTER — TELEPHONE (OUTPATIENT)
Dept: GASTROENTEROLOGY | Facility: CLINIC | Age: 63
End: 2023-03-07

## 2023-03-07 NOTE — TELEPHONE ENCOUNTER
Caller: [facility]IVE    Relationship: PRACTICE MANAGER  PC [employer]    Best call back number: [phone]    What is the best time to reach you: 8-4:30PM    Who are you requesting to speak with (clinical staff, provider,  specific staff member): CLINICAL    What was the call regarding: LIVE STATED THAT THIS PATIENT NEVER RECEIVED HER COLONOSCOPY QUESTIONNAIRE AND STILL HASN'T BEEN SCHEDULED FOR HER COLONOSCOPY. THIS PATIENT HAS HAD AN ABNORMAL COLONOSCOPY AND THIS HAS BEEN GOING ON SINCE LAST NOV. SHE WOULD LIKE A NURSE TO CALL HER BACK ASAP. SHE STATED THAT SHE HAS MULTIPLE PATIENTS THAT THEY HAVE REFERRALS OUT FOR AND THE SAME THING IS HAPPENING TO THEM AND NOTHING IS GETTING DONE. UNABLE TO WARM TRANSFER TO NON CLINICAL NOR CLINICAL. THESE ARE GETTING SENT BACK TO AMBULATORY CARE.    Do you require a callback: YES

## 2023-03-16 ENCOUNTER — PRE-PROCEDURE SCREENING (OUTPATIENT)
Dept: GASTROENTEROLOGY | Facility: CLINIC | Age: 63
End: 2023-03-16
Payer: COMMERCIAL

## 2023-03-16 ENCOUNTER — PREP FOR SURGERY (OUTPATIENT)
Dept: OTHER | Facility: HOSPITAL | Age: 63
End: 2023-03-16
Payer: COMMERCIAL

## 2023-03-16 DIAGNOSIS — Z86.010 HISTORY OF COLON POLYPS: Primary | ICD-10-CM

## 2023-03-16 NOTE — TELEPHONE ENCOUNTER
LAST SCOPE 8/18 IN Epic --Personal history of polyps--No family history of polyps or colon cancer-ASPIRIN --Medications:                      albuterol sulfate  (90 Base) MCG/ACT inhaler  cyclobenzaprine (FLEXERIL) 10 MG tablet  diclofenac (VOLTAREN) 50 MG EC tablet    fluticasone (Flonase) 50 MCG/ACT nasal spray    levothyroxine (SYNTHROID, LEVOTHROID) 200 MCG tablet  Melatonin 10 MG tablet dispersible  methylPREDNISolone (MEDROL) 4 MG dose pack  pantoprazole (Protonix) 40 MG EC tablet  phenazopyridine (PYRIDIUM) 200 MG tablet  promethazine (PHENERGAN) 25 MG tablet    venlafaxine XR (EFFEXOR-XR) 150 MG 24 hr capsule  ASPIRIN       QUESTIONNAIRE SCREENING  HAS BEEN SENT TO DOCTOR FOR REVIEW

## 2023-03-17 ENCOUNTER — PREP FOR SURGERY (OUTPATIENT)
Dept: OTHER | Facility: HOSPITAL | Age: 63
End: 2023-03-17
Payer: COMMERCIAL

## 2023-03-17 ENCOUNTER — OFFICE VISIT (OUTPATIENT)
Dept: ORTHOPEDIC SURGERY | Facility: CLINIC | Age: 63
End: 2023-03-17
Payer: COMMERCIAL

## 2023-03-17 ENCOUNTER — DOCUMENTATION (OUTPATIENT)
Dept: ORTHOPEDIC SURGERY | Facility: CLINIC | Age: 63
End: 2023-03-17

## 2023-03-17 VITALS — BODY MASS INDEX: 45.22 KG/M2 | TEMPERATURE: 98.6 F | WEIGHT: 288.1 LBS | HEIGHT: 67 IN

## 2023-03-17 DIAGNOSIS — M16.12 PRIMARY OSTEOARTHRITIS OF LEFT HIP: Primary | ICD-10-CM

## 2023-03-17 PROCEDURE — 99214 OFFICE O/P EST MOD 30 MIN: CPT | Performed by: NURSE PRACTITIONER

## 2023-03-17 RX ORDER — MELOXICAM 15 MG/1
15 TABLET ORAL ONCE
OUTPATIENT
Start: 2023-06-26 | End: 2023-03-17

## 2023-03-17 RX ORDER — VANCOMYCIN 2 GRAM/500 ML IN 0.9 % SODIUM CHLORIDE INTRAVENOUS
15 ONCE
OUTPATIENT
Start: 2023-06-26 | End: 2023-03-17

## 2023-03-17 RX ORDER — PREGABALIN 75 MG/1
150 CAPSULE ORAL ONCE
OUTPATIENT
Start: 2023-06-26 | End: 2023-03-17

## 2023-03-17 RX ORDER — CHLORHEXIDINE GLUCONATE 500 MG/1
CLOTH TOPICAL 2 TIMES DAILY
OUTPATIENT
Start: 2023-03-17

## 2023-03-17 RX ORDER — VARENICLINE TARTRATE 25 MG
0.5 KIT ORAL
COMMUNITY
Start: 2023-01-06

## 2023-03-17 RX ORDER — CEFAZOLIN SODIUM IN 0.9 % NACL 3 G/100 ML
3 INTRAVENOUS SOLUTION, PIGGYBACK (ML) INTRAVENOUS ONCE
OUTPATIENT
Start: 2023-06-26 | End: 2023-03-17

## 2023-03-17 NOTE — PROGRESS NOTES
Patient: Iris Hyde  YOB: 1960 62 y.o. female  Medical Record Number: [id number]    Chief Complaints:   Chief Complaint   Patient presents with   • Left Hip - Follow-up   • Right Hip - Follow-up       History of Present Illness:Iris Hyde is a 62 y.o. female who presents with complaints of severe bilateral hip pain left greater than right.  Patient has known bone-on-bone end-stage osteoarthritis, she has been successful in losing weight and her BMI is down to 45.11.  Patient reports that the pain is severe she would like to proceed with surgery    Allergies:   Allergies   Allergen Reactions   • Butorphanol Mental Status Change   • Butorphanol Tartrate Other (See Comments)     PSYCHOTIC   • Hydrocodone-Acetaminophen Itching     norco    • Cymbalta [Duloxetine Hcl] Anxiety   • Hydrocodone-Acetaminophen Rash       Medications:   Current Outpatient Medications   Medication Sig Dispense Refill   • albuterol sulfate  (90 Base) MCG/ACT inhaler Inhale 2 puffs Every 4 (Four) Hours As Needed for Wheezing or Shortness of Air. 18 g 1   • cyclobenzaprine (FLEXERIL) 10 MG tablet Take 1 tablet by mouth 3 (Three) Times a Day As Needed for Muscle Spasms. 90 tablet 3   • diclofenac (VOLTAREN) 50 MG EC tablet Take 1 tablet by mouth 3 (Three) Times a Day As Needed (Osteoarthritis). 90 tablet 2   • fluticasone (Flonase) 50 MCG/ACT nasal spray 1 spray in each nostril twice daily 16 g 1   • levothyroxine (SYNTHROID, LEVOTHROID) 200 MCG tablet Take 1 tablet by mouth Daily. Take with 8 onces of water 1 hour before meals/medications. 90 tablet 3   • Melatonin 10 MG tablet dispersible      • pantoprazole (Protonix) 40 MG EC tablet Take 1 tablet by mouth Daily. 30 tablet 2   • phenazopyridine (PYRIDIUM) 200 MG tablet Take 1 tablet by mouth 3 (Three) Times a Day As Needed for Bladder Spasms. Patient to make appointment to see urology.  No refills. 6 tablet 0   • promethazine (PHENERGAN) 25 MG tablet Take 1 tablet by  "mouth Every 6 (Six) Hours As Needed for Nausea or Vomiting (Gastroparesis). 90 tablet 2   • Varenicline Tartrate 0.5 MG X 11 & 1 MG X 42 tablet Take 0.5 mg by mouth.     • venlafaxine XR (EFFEXOR-XR) 150 MG 24 hr capsule Take 1 capsule by mouth Daily. 30 capsule 3   • methylPREDNISolone (MEDROL) 4 MG dose pack Use as directed by package instructions (Patient not taking: Reported on 3/17/2023) 21 tablet 0     No current facility-administered medications for this visit.         The following portions of the patient's history were reviewed and updated as appropriate: allergies, current medications, past family history, past medical history, past social history, past surgical history and problem list.    Review of Systems:   14 point review of systems was performed. All systems negative except pertinent positives/negatives listed in HPI above    Physical Exam:   Vitals:    03/17/23 1601   Temp: 98.6 °F (37 °C)   TempSrc: Temporal   Weight: 131 kg (288 lb 1.6 oz)   Height: 170.2 cm (67.01\")   PainSc:   9   PainLoc: Hip       General: A and O x 3, ASA, NAD   Skin clear no unusual lesions noted  Bilateral hips patient has decreased range of motion secondary to pain with a positive Stinchfield positive logroll calf soft and nontender   Body mass index is 45.11 kg/m².      Radiology:  Xrays 2 views of bilateral hips were reviewed that were done previously show bone-on-bone end-stage osteoarthritis with cyst and spur formation    Assessment/Plan: End-stage osteoarthritis bilateral hips with severe pain    Patient and I discussed options, she understands that her BMI needs to be less than 45 in order for us to proceed with surgery, however she is having such severe surgery I think we can proceed with getting her scheduled, Dr. Lagos is agreeable, we have set a minimum 10 pound weight loss goal before her surgery, patient verbalized understanding.  Continuation of conservative management vs. ALLISON discussed.  The patient wishes " to proceed with total hip replacement.  At this point the patient has failed the full gamut of conservative treatment and stating complete understanding of the risks/benefits/ anternatives wishes to proceed with surgical treatment.    Risk and benefits of surgery were reviewed.  Including, but not limited to, blood clots, anesthesia risk, infection, leg length discrepancy, fracture, skin/leg numbness, failure of the implant, need for future surgeries, continued pain, hematoma, need for transfusion, and death, among others.  The patient understands and wishes to proceed.     The spectrum of treatment options were discussed with the patient in detail including both the nonoperative and operative treatment modalities and their respective risks and benefits.  After thorough discussion, the patient has elected to undergo surgical treatment.  The details of the surgical procedure were explained including the location of probable incisions and a description of the likely implants to be used.  Models and diagrams were used as educational resources. The patient understands the likely convalescence after surgery, as well as the rehabilitation required.  We thoroughly discussed the risks, benefits, and alternatives to surgery.  The risks include but are not limited to the risk of infection, joint stiffness, blood clots (including DVT and/or pulmonary embolus along with the risk of death), neurologic and/or vascular injury, fracture, dislocation, nonunion, malunion, need for further surgery including hardware failure requiring revision, and continued pain.  It was explained that if tissue has been repaired or reconstructed, there is also a chance of failure which may require further management.  Following the completion of the discussion, the patient expressed understanding of this planned course of care, all their questions were answered and consent will be obtained preoperatively.    Operative Plan: Posterior approach Total  Hip Replacement  Outpatient          Pura Escobar, APRN  3/17/2023

## 2023-03-22 ENCOUNTER — TELEPHONE (OUTPATIENT)
Dept: GASTROENTEROLOGY | Facility: CLINIC | Age: 63
End: 2023-03-22
Payer: COMMERCIAL

## 2023-03-30 ENCOUNTER — TELEPHONE (OUTPATIENT)
Dept: PHYSICAL THERAPY | Facility: CLINIC | Age: 63
End: 2023-03-30
Payer: COMMERCIAL

## 2023-03-30 DIAGNOSIS — M16.12 PRIMARY OSTEOARTHRITIS OF LEFT HIP: Primary | ICD-10-CM

## 2023-03-31 PROBLEM — M16.12 PRIMARY OSTEOARTHRITIS OF LEFT HIP: Status: ACTIVE | Noted: 2023-03-31

## 2023-04-07 ENCOUNTER — TREATMENT (OUTPATIENT)
Dept: PHYSICAL THERAPY | Facility: CLINIC | Age: 63
End: 2023-04-07
Payer: COMMERCIAL

## 2023-04-07 ENCOUNTER — TELEPHONE (OUTPATIENT)
Dept: PHYSICAL THERAPY | Facility: CLINIC | Age: 63
End: 2023-04-07
Payer: COMMERCIAL

## 2023-04-07 DIAGNOSIS — M25.551 BILATERAL HIP PAIN: ICD-10-CM

## 2023-04-07 DIAGNOSIS — R29.898 WEAKNESS OF BOTH LOWER EXTREMITIES: ICD-10-CM

## 2023-04-07 DIAGNOSIS — M16.12 OSTEOARTHRITIS OF LEFT HIP, UNSPECIFIED OSTEOARTHRITIS TYPE: Primary | ICD-10-CM

## 2023-04-07 DIAGNOSIS — M25.552 BILATERAL HIP PAIN: ICD-10-CM

## 2023-04-07 DIAGNOSIS — R26.2 DIFFICULTY WALKING: ICD-10-CM

## 2023-04-07 PROCEDURE — 97110 THERAPEUTIC EXERCISES: CPT

## 2023-04-07 PROCEDURE — 97162 PT EVAL MOD COMPLEX 30 MIN: CPT

## 2023-04-07 NOTE — PROGRESS NOTES
Physical Therapy Initial Evaluation and Plan of Care      Patient: Iris Hyde   : 1960  Diagnosis/ICD-10 Code:  Osteoarthritis of left hip, unspecified osteoarthritis type [M16.12]  Referring practitioner: YAYA Hargrove  Date of Initial Visit: 2023  Today's Date: 2023  Patient seen for 1 sessions           Subjective Questionnaire: LEFS: 14      Subjective Evaluation    History of Present Illness  Date of surgery: Scheduled L ALLISON 2023.  Mechanism of injury:  was last cortisone injection but then pt experienced a fall at work on  which caused her pain to get progressively worse. She has pain in both hips, but pain in L is worse. The pain radiates down into both legs. Pt has trouble sitting, standing, getting into or out of her car, and lying down. She cannot lie on either hip which prevents her from sleeping in her bed, causing her to sleep in a recliner for the last 3 months.    Subjective comment: Pt reports significant pain in L hip that began about 3 years ago. Cortizone injections help to alleviate pain and can last up to a year for her.   Patient Occupation: LPN Quality of life: good    Pain  Current pain ratin  At best pain rating: 3  At worst pain rating: 10  Location: L hip  Quality: radiating, throbbing, burning, dull ache, pressure, sharp and discomfort  Relieving factors: medications (compression)  Aggravating factors: lifting, movement, standing, sleeping, prolonged positioning, ambulation, stairs and squatting  Progression: worsening    Social Support  Lives in: one-story house  Lives with: young children and adult children (brother)    Diagnostic Tests  X-ray: abnormal (Xrays 2 views of bilateral hips were reviewed that were done previously show bone-on-bone end-stage osteoarthritis with cyst and spur formation)    Treatments  Previous treatment: physical therapy, injection treatment and medication (for shoulder)  Patient Goals  Patient  goals for therapy: increased strength, decreased pain, improved balance, increased motion, return to work and independence with ADLs/IADLs             Objective          Palpation   Left   Tenderness of the gluteus virgil, gluteus medius and piriformis.     Tenderness     Left Hip   Tenderness in the greater trochanter, iliac crest and sacroiliac joint.     Right Hip   Tenderness in the greater trochanter.     Neurological Testing     Sensation     Hip   Left Hip   Intact: light touch    Right Hip   Intact: light touch    Active Range of Motion   Left Hip   Normal active range of motion    Right Hip   Normal active range of motion  Left Knee   Flexion: 130 degrees with pain  Extensor lag: 10 degrees     Right Knee   Flexion: 138 degrees   Extensor lag: 10 degrees     Additional Active Range of Motion Details  All BLE AROM is WFL but motion in all planes is painful in L  hip    Strength/Myotome Testing     Left Hip   Planes of Motion   Flexion: 3  Extension: 3- (significant pain with testing)  Abduction: 3 (Significant pain with test)    Right Hip   Planes of Motion   Flexion: 3+  Extension: 3-  Right hip abductors strength: unable to lay L sidelying to test.    Left Knee   Flexion: 5  Extension: 5    Right Knee   Flexion: 5  Extension: 5    Ambulation   Weight-Bearing Status   Distance in feet: 100  Assistive device used: four-wheeled walker    Additional Weight-Bearing Status Details  Pt unable to ambulate more than 10' without use of rollator. She ambulates with forward flexed trunk, decreased stride length decreased heel strike bilaterally, and antalgic gait on the L LE          Assessment & Plan     Assessment  Impairments: abnormal gait, abnormal or restricted ROM, activity intolerance, impaired balance, impaired physical strength, lacks appropriate home exercise program, pain with function, safety issue and weight-bearing intolerance  Functional Limitations: sleeping, walking, uncomfortable because of pain,  moving in bed, sitting, standing and unable to perform repetitive tasks  Assessment details: Pt is a 62 year old female presenting to OP PT for initial evaluation due to bilateral bone-on-bone osteoarthritis of her hips which causes significant and unbearable pain in both hips, L>R. Pt arrived to clinic using a rollator walker and stated that she cannot ambulate for long distances without the use of an assistive device. She has great difficulty with sitting or standing for long periods of time and she cannot don or doff her pants or socks without assistance due to pain. Pt reports alleviation of symptoms with bilateral compression at her greater trochanters. She was educated on possible benefits of purchasing an SI binder to assist with compression while at work and in weight bearing positions. Upon evaluation, pt presents with significant deficits in BLE and core strength as well as pain with AROM testing. She is unable to safely perform sit to stand transfers without the use of UE assistance and had difficulty changing positions on the plinth throughout the evaluation. Pt is scheduled for L ALLISON on 6/26/23 pending weight loss to reach a BMI of 40. Skilled OP PT is deemed reasonable and necessary to address aforementioned deficits, limitations, and impairments in order to assist pt with increasing independence and safety during ambulation and daily tasks without reports of pain.  Pt scored 14/80 on the Lower Extremity Functional Index, indicating significant perceived level of impairment.  Prognosis: good    Goals  Plan Goals: Short Term: 4 weeks  1. Pt will be independent with initial HEP.  2. Pt will report >/= 50% reduction in pain throughout the day in order to demonstrate improvement in ability to perform work duties and ADLs independently  3. Pt will be able to ambulate 50' without the use of an assistive device with symmetrical gait mechanics WNL for indication of decreased pain and improved BLE strength  4.  Pt will demonstrate improved BLE strength of >/= 4/5 in all planes of motion without reports of pain.    Long Term:  1. Pt will be independent with progressed HEP  2. Pt will report >/= 90% decrease in pain during work hours and while at home in order to demonstrate improvement in ability to perform work duties and ADLs independently  3. Pt will demonstrate improved BLE strength of 4+/5 in all planes of motion without reports of pain  4. Pt will ambulate 100' without the use of an assistive device and with symmetrical gait mechanics WNL to demonstrate ability to ambulate at work independently and safely   5. Pt will perform 5X STS test without the use of UE assistance in 12 seconds for demonstration of improved BLE strength and ROM as well as ability to perform functional transfers independently     Plan  Therapy options: will be seen for skilled therapy services  Planned modality interventions: cryotherapy and thermotherapy (hydrocollator packs)  Planned therapy interventions: abdominal trunk stabilization, balance/weight-bearing training, functional ROM exercises, gait training, home exercise program, therapeutic activities, stretching, strengthening, neuromuscular re-education, motor coordination training and manual therapy  Frequency: 2x week  Duration in weeks: 12  Treatment plan discussed with: patient        Manual Therapy:    0     mins  41789;  Therapeutic Exercise:    25     mins  67809;     Neuromuscular Rashawn:    0    mins  44229;    Therapeutic Activity:     0     mins  10248;     Gait Trainin     mins  27904;     Ultrasound:     0     mins  68632;    Electrical Stimulation:    0     mins  57851 ( );  Dry Needling     0     mins self-pay    Timed Treatment:   25   mins   Total Treatment:     58   mins    PT SIGNATURE: Hector Gotti PT, DPT   DATE TREATMENT INITIATED: 2023    Initial Certification  Certification Period: 2023  I certify that the therapy services are furnished  while this patient is under my care.  The services outlined above are required by this patient, and will be reviewed every 90 days.     PHYSICIAN: Pura Escobar, APRN      DATE:     Please sign and return via fax to [phone].. Thank you, [facility].

## 2023-04-07 NOTE — TELEPHONE ENCOUNTER
LEFT MESSAGE ON PATIENTS VOICEMAIL ASKING HER IF SHE WANTED TO COME IN TODAY 4-7-23 @ 2:00 SINCE WE HAD TO CANCEL YESTERDAYS APPT DUE TO POWER OUTAGE. WAITING TO HEAR BACK FROM PATIENT

## 2023-04-14 ENCOUNTER — OFFICE VISIT (OUTPATIENT)
Dept: FAMILY MEDICINE CLINIC | Facility: CLINIC | Age: 63
End: 2023-04-14
Payer: COMMERCIAL

## 2023-04-14 VITALS
HEIGHT: 67 IN | SYSTOLIC BLOOD PRESSURE: 158 MMHG | OXYGEN SATURATION: 95 % | DIASTOLIC BLOOD PRESSURE: 124 MMHG | WEIGHT: 288 LBS | HEART RATE: 75 BPM | BODY MASS INDEX: 45.2 KG/M2

## 2023-04-14 DIAGNOSIS — F17.210 CIGARETTE SMOKER: ICD-10-CM

## 2023-04-14 DIAGNOSIS — M25.551 BILATERAL HIP PAIN: ICD-10-CM

## 2023-04-14 DIAGNOSIS — I10 ESSENTIAL HYPERTENSION: Primary | ICD-10-CM

## 2023-04-14 DIAGNOSIS — M25.552 BILATERAL HIP PAIN: ICD-10-CM

## 2023-04-14 PROCEDURE — 99213 OFFICE O/P EST LOW 20 MIN: CPT | Performed by: NURSE PRACTITIONER

## 2023-04-14 NOTE — PROGRESS NOTES
"Chief Complaint  Weight Check (F/U) and Nicotine Dependence    Subjective          Iris Hyde presents to [facility] for  History of Present Illness  She is here with complaint of falling down back in November, 2022, and reports pain in bilateral hips are increasing.  Her pain level is 8/10, pain is a constant throbbing pain in bilateral groin.  She has been alternating Diclofenac, Ibuprofen and Aleve and Aspirin.  She has also taken 2 tramadol tablets that her sister gave her.  She is currently under the care of Dr. Charlie Lagos for bilateral hip pain.  She is schedule to have a hip replacement surgery by Dr. Lagos in the next couple months.  She indicated that she has to quit smoking prior to hip surgery but she does not have a date set to quit smoking.  She said she has used Chantix in the past and did not have any problems taking this medication.  She is requesting to start Chantix today.          Review of Systems   Respiratory: Negative for shortness of breath.    Cardiovascular: Negative for chest pain and palpitations.   Musculoskeletal: Positive for arthralgias and gait problem. Negative for joint swelling.        Bilateral hip pain.   Neurological: Negative for dizziness and light-headedness.         Objective   Vital Signs:   BP (!) 158/124 (BP Location: Left arm, Patient Position: Sitting, Cuff Size: Adult)   Pulse 75   Ht 170.2 cm (67\")   Wt 131 kg (288 lb)   SpO2 95%   BMI 45.11 kg/m²     Physical Exam  Vitals and nursing note reviewed.   Constitutional:       General: She is not in acute distress.     Appearance: Normal appearance.   HENT:      Head: Normocephalic and atraumatic.   Eyes:      Extraocular Movements: Extraocular movements intact.      Conjunctiva/sclera: Conjunctivae normal.      Pupils: Pupils are equal, round, and reactive to light.   Cardiovascular:      Rate and Rhythm: Normal rate and regular rhythm.      Heart sounds: Normal heart sounds. No " murmur heard.  Pulmonary:      Effort: Pulmonary effort is normal. No respiratory distress.      Breath sounds: Normal breath sounds. No wheezing.   Musculoskeletal:         General: Tenderness present. No swelling.      Right lower leg: Edema present.      Left lower leg: Edema present.      Comments: BLE trace non-pitting edema; Tenderness on palpation of bilateral groins.  Decreased ROM and pain with bilateral lateral rotation of hips.   Skin:     General: Skin is warm and dry.      Findings: No erythema.   Neurological:      General: No focal deficit present.      Mental Status: She is alert and oriented to person, place, and time.   Psychiatric:         Mood and Affect: Mood normal.         Behavior: Behavior normal.         Thought Content: Thought content normal.         Judgment: Judgment normal.        Result Review :   The following data was reviewed by: YAYA Morgan on 04/14/2023:  CMP        11/14/2022    11:39   CMP   Glucose 101     BUN 11     Creatinine 0.95     EGFR 68.3     Sodium 142     Potassium 3.5     Chloride 104     Calcium 9.0     Total Protein 7.0     Albumin 4.00     Globulin 3.0     Total Bilirubin 0.2     Alkaline Phosphatase 119     AST (SGOT) 22     ALT (SGPT) 15     Albumin/Globulin Ratio 1.3     BUN/Creatinine Ratio 11.6     Anion Gap 14.0                 Assessment and Plan    Diagnoses and all orders for this visit:    1. Essential hypertension (Primary)  Assessment & Plan:  Hypertension is worsening.  Decrease use of NSAID products.  Dietary sodium restriction.  Weight loss.  Regular aerobic exercise.  Stop smoking.  Blood pressure will be reassessed in 2 weeks.      2. Bilateral hip pain  Assessment & Plan:  C/O bilateral hip pain.  She has been alternating Diclofenac, Aleve, Ibuprofen and Aspirin.  She has also been taking her sister's Tramadol pain medication due to severe pain.  Discussed importance of only taking one NSAID.  She said she will continue diclofenac  50mg TID.  Can take Tylenol (OTC) PRN for breakthrough pain.  Apply Ice pack to bilateral hips for pain control.  Weight loss will help reduce pain.  Decrease weight bearing.        3. Cigarette smoker  Assessment & Plan:  She currently smokes 0.5ppd.  She was told to stop smoking prior to hip replacement surgery.  She has taken Varenicline in the past and tolerated well.  RX: Varenicline starter pack  She does not have a quit date set at this time.  Encouraged patient to quit smoking.    Orders:  -     Varenicline Tartrate 0.5 MG X 11 & 1 MG X 42 tablet; Days 1 to 3 - take 0.5mg once a day; Days 4 to 7 - take 0.5mg 2 times per day; Days 8 to end of treatment - take 1mg 2 times per day.  Dispense: 53 tablet; Refill: 0      Follow Up   Return in about 2 weeks (around 4/28/2023) for Recheck - Blood Pressure.  Patient was given instructions and counseling regarding her condition or for health maintenance advice. Please see specific information pulled into the AVS if appropriate.

## 2023-04-18 ENCOUNTER — TELEPHONE (OUTPATIENT)
Dept: FAMILY MEDICINE CLINIC | Facility: CLINIC | Age: 63
End: 2023-04-18
Payer: COMMERCIAL

## 2023-04-18 DIAGNOSIS — Z12.2 ENCOUNTER FOR SCREENING FOR LUNG CANCER: Primary | ICD-10-CM

## 2023-04-18 NOTE — TELEPHONE ENCOUNTER
Called patient and she agreed to getting low dose CT scan for lung cancer screening.  Order placed for Low Dose Lung CT scan today.

## 2023-04-23 PROBLEM — M25.552 BILATERAL HIP PAIN: Status: ACTIVE | Noted: 2023-04-23

## 2023-04-23 PROBLEM — M25.551 BILATERAL HIP PAIN: Status: ACTIVE | Noted: 2023-04-23

## 2023-04-23 RX ORDER — VARENICLINE TARTRATE 25 MG
KIT ORAL
Qty: 53 TABLET | Refills: 0 | Status: SHIPPED | OUTPATIENT
Start: 2023-04-23

## 2023-04-24 NOTE — ASSESSMENT & PLAN NOTE
She currently smokes 0.5ppd.  She was told to stop smoking prior to hip replacement surgery.  She has taken Varenicline in the past and tolerated well.  RX: Varenicline starter pack  She does not have a quit date set at this time.  Encouraged patient to quit smoking.

## 2023-04-24 NOTE — ASSESSMENT & PLAN NOTE
C/O bilateral hip pain.  She has been alternating Diclofenac, Aleve, Ibuprofen and Aspirin.  She has also been taking her sister's Tramadol pain medication due to severe pain.  Discussed importance of only taking one NSAID.  She said she will continue diclofenac 50mg TID.  Can take Tylenol (OTC) PRN for breakthrough pain.  Apply Ice pack to bilateral hips for pain control.  Weight loss will help reduce pain.  Decrease weight bearing.

## 2023-04-24 NOTE — ASSESSMENT & PLAN NOTE
Hypertension is worsening.  Decrease use of NSAID products.  Dietary sodium restriction.  Weight loss.  Regular aerobic exercise.  Stop smoking.  Blood pressure will be reassessed in 2 weeks.

## 2023-05-01 ENCOUNTER — OFFICE VISIT (OUTPATIENT)
Dept: FAMILY MEDICINE CLINIC | Facility: CLINIC | Age: 63
End: 2023-05-01
Payer: COMMERCIAL

## 2023-05-01 VITALS
RESPIRATION RATE: 20 BRPM | TEMPERATURE: 98.3 F | HEIGHT: 67 IN | DIASTOLIC BLOOD PRESSURE: 110 MMHG | BODY MASS INDEX: 44.73 KG/M2 | WEIGHT: 285 LBS | HEART RATE: 83 BPM | OXYGEN SATURATION: 97 % | SYSTOLIC BLOOD PRESSURE: 142 MMHG

## 2023-05-01 DIAGNOSIS — E66.01 OBESITY, MORBID, BMI 40.0-49.9: ICD-10-CM

## 2023-05-01 DIAGNOSIS — I10 ESSENTIAL HYPERTENSION: Primary | ICD-10-CM

## 2023-05-01 DIAGNOSIS — E03.9 ACQUIRED HYPOTHYROIDISM: ICD-10-CM

## 2023-05-01 RX ORDER — LOSARTAN POTASSIUM 25 MG/1
25 TABLET ORAL DAILY
Qty: 30 TABLET | Refills: 0 | Status: SHIPPED | OUTPATIENT
Start: 2023-05-01

## 2023-05-01 NOTE — PROGRESS NOTES
"Chief Complaint  Hypertension (Follow up possible labs ) and Hypothyroidism    Subjective          Iris Hyde presents to [facility] for  History of Present Illness  She is here to follow-up on HYPERTENSION and Hypothyroidism:    HYPERTENSION - she stopped alternating NSAIDs and is only taking Diclofenac 50mg - 2 tablets BID.  She is still experiencing pain, in bilateral hips.  She denies SOB, chest pain, heart palpitations, lightheadedness and dizziness.    Hypothyroidism - she is currently taking Levothyroxine 200mg daily.  She denies heart palpitations, and heat or cold intolerance.    She is fasting today and able to do labs.      Review of Systems   Respiratory: Negative for shortness of breath.    Cardiovascular: Negative for chest pain and palpitations.   Endocrine: Negative for cold intolerance and heat intolerance.   Neurological: Negative for dizziness, light-headedness and headaches.         Objective   Vital Signs:   BP (!) 142/110 (BP Location: Left arm, Patient Position: Sitting, Cuff Size: Large Adult)   Pulse 83   Temp 98.3 °F (36.8 °C) (Temporal)   Resp 20   Ht 170 cm (66.93\")   Wt 129 kg (285 lb)   SpO2 97%   BMI 44.73 kg/m²     Physical Exam  Vitals and nursing note reviewed.   Constitutional:       General: She is not in acute distress.     Appearance: Normal appearance.   HENT:      Head: Normocephalic and atraumatic.   Eyes:      Conjunctiva/sclera: Conjunctivae normal.      Pupils: Pupils are equal, round, and reactive to light.   Cardiovascular:      Rate and Rhythm: Normal rate and regular rhythm.      Heart sounds: Normal heart sounds. No murmur heard.  Pulmonary:      Effort: Pulmonary effort is normal. No respiratory distress.      Breath sounds: Normal breath sounds. No wheezing.   Skin:     General: Skin is warm and dry.      Findings: No erythema.   Neurological:      General: No focal deficit present.      Mental Status: She is alert. "   Psychiatric:         Mood and Affect: Mood normal.        Result Review :     CMP        11/14/2022    11:39 5/1/2023    14:03   CMP   Glucose 101   101     BUN 11   13     Creatinine 0.95   1.07     EGFR 68.3      Sodium 142   142     Potassium 3.5   3.5     Chloride 104   103     Calcium 9.0   9.9     Total Protein  7.4     Total Protein 7.0      Albumin 4.00   4.5     Globulin  2.9     Globulin 3.0      Total Bilirubin 0.2   0.4     Alkaline Phosphatase 119   133     AST (SGOT) 22   18     ALT (SGPT) 15   17     Albumin/Globulin Ratio 1.3      BUN/Creatinine Ratio 11.6   12.1     Anion Gap 14.0                  Assessment and Plan    Diagnoses and all orders for this visit:    1. Essential hypertension (Primary)  Assessment & Plan:  Hypertension is newly identified.  Dietary sodium restriction.  Weight loss.  Regular aerobic exercise.  Stop smoking.   Start Losartan 25 mg daily.  Discussed SE of medication.  Labs:  CBC, CMP,  Lipid Panel.  Blood pressure will be reassessed in 4 weeks.    Orders:  -     losartan (Cozaar) 25 MG tablet; Take 1 tablet by mouth Daily.  Dispense: 30 tablet; Refill: 0  -     CBC & Differential  -     Comprehensive Metabolic Panel    2. Acquired hypothyroidism  Assessment & Plan:  TSH elevated on prior lab dated 11/2022.  Continue Levothyroxine 200mg daily.  Lab:  TSH  Will call with result and plan.    Orders:  -     TSH    3. Obesity, morbid, BMI 40.0-49.9  Assessment & Plan:  Patient's (Body mass index is 44.73 kg/m².) indicates that they are morbidly/severely obese (BMI > 40 or > 35 with obesity - related health condition) with health conditions that include hypertension and dyslipidemias . Weight is improving. BMI  is above average; BMI management plan is completed. We discussed low calorie, low carb based diet program, portion control and increasing exercise.   Lab:  Lipid Panel.  Will call with result and plan.    Orders:  -     Lipid Panel      Follow Up   Return in about 1  month (around 6/1/2023) for Follow-up Blood Pressure - medication change; Labs today.  Patient was given instructions and counseling regarding her condition or for health maintenance advice. Please see specific information pulled into the AVS if appropriate.

## 2023-05-02 LAB
ALBUMIN SERPL-MCNC: 4.5 G/DL (ref 3.5–5.2)
ALBUMIN/GLOB SERPL: 1.6 G/DL
ALP SERPL-CCNC: 133 U/L (ref 39–117)
ALT SERPL-CCNC: 17 U/L (ref 1–33)
AST SERPL-CCNC: 18 U/L (ref 1–32)
BASOPHILS # BLD AUTO: 0.04 10*3/MM3 (ref 0–0.2)
BASOPHILS NFR BLD AUTO: 0.4 % (ref 0–1.5)
BILIRUB SERPL-MCNC: 0.4 MG/DL (ref 0–1.2)
BUN SERPL-MCNC: 13 MG/DL (ref 8–23)
BUN/CREAT SERPL: 12.1 (ref 7–25)
CALCIUM SERPL-MCNC: 9.9 MG/DL (ref 8.6–10.5)
CHLORIDE SERPL-SCNC: 103 MMOL/L (ref 98–107)
CHOLEST SERPL-MCNC: 219 MG/DL (ref 0–200)
CO2 SERPL-SCNC: 29.6 MMOL/L (ref 22–29)
CREAT SERPL-MCNC: 1.07 MG/DL (ref 0.57–1)
EGFRCR SERPLBLD CKD-EPI 2021: 59.2 ML/MIN/1.73
EOSINOPHIL # BLD AUTO: 0.1 10*3/MM3 (ref 0–0.4)
EOSINOPHIL NFR BLD AUTO: 1.1 % (ref 0.3–6.2)
ERYTHROCYTE [DISTWIDTH] IN BLOOD BY AUTOMATED COUNT: 13.6 % (ref 12.3–15.4)
GLOBULIN SER CALC-MCNC: 2.9 GM/DL
GLUCOSE SERPL-MCNC: 101 MG/DL (ref 65–99)
HCT VFR BLD AUTO: 47.3 % (ref 34–46.6)
HDLC SERPL-MCNC: 48 MG/DL (ref 40–60)
HGB BLD-MCNC: 15.7 G/DL (ref 12–15.9)
IMM GRANULOCYTES # BLD AUTO: 0.03 10*3/MM3 (ref 0–0.05)
IMM GRANULOCYTES NFR BLD AUTO: 0.3 % (ref 0–0.5)
LDLC SERPL CALC-MCNC: 150 MG/DL (ref 0–100)
LYMPHOCYTES # BLD AUTO: 4.43 10*3/MM3 (ref 0.7–3.1)
LYMPHOCYTES NFR BLD AUTO: 49.3 % (ref 19.6–45.3)
MCH RBC QN AUTO: 31.5 PG (ref 26.6–33)
MCHC RBC AUTO-ENTMCNC: 33.2 G/DL (ref 31.5–35.7)
MCV RBC AUTO: 95 FL (ref 79–97)
MONOCYTES # BLD AUTO: 0.56 10*3/MM3 (ref 0.1–0.9)
MONOCYTES NFR BLD AUTO: 6.2 % (ref 5–12)
NEUTROPHILS # BLD AUTO: 3.82 10*3/MM3 (ref 1.7–7)
NEUTROPHILS NFR BLD AUTO: 42.7 % (ref 42.7–76)
NRBC BLD AUTO-RTO: 0 /100 WBC (ref 0–0.2)
PLATELET # BLD AUTO: 235 10*3/MM3 (ref 140–450)
POTASSIUM SERPL-SCNC: 3.5 MMOL/L (ref 3.5–5.2)
PROT SERPL-MCNC: 7.4 G/DL (ref 6–8.5)
RBC # BLD AUTO: 4.98 10*6/MM3 (ref 3.77–5.28)
SODIUM SERPL-SCNC: 142 MMOL/L (ref 136–145)
TRIGL SERPL-MCNC: 117 MG/DL (ref 0–150)
TSH SERPL DL<=0.005 MIU/L-ACNC: 2.82 UIU/ML (ref 0.27–4.2)
VLDLC SERPL CALC-MCNC: 21 MG/DL (ref 5–40)
WBC # BLD AUTO: 8.98 10*3/MM3 (ref 3.4–10.8)

## 2023-05-05 ENCOUNTER — TELEPHONE (OUTPATIENT)
Dept: FAMILY MEDICINE CLINIC | Facility: CLINIC | Age: 63
End: 2023-05-05
Payer: COMMERCIAL

## 2023-05-08 NOTE — ASSESSMENT & PLAN NOTE
Patient's (Body mass index is 44.73 kg/m².) indicates that they are morbidly/severely obese (BMI > 40 or > 35 with obesity - related health condition) with health conditions that include hypertension and dyslipidemias . Weight is improving. BMI  is above average; BMI management plan is completed. We discussed low calorie, low carb based diet program, portion control and increasing exercise.   Lab:  Lipid Panel.  Will call with result and plan.

## 2023-05-08 NOTE — ASSESSMENT & PLAN NOTE
Hypertension is newly identified.  Dietary sodium restriction.  Weight loss.  Regular aerobic exercise.  Stop smoking.   Start Losartan 25 mg daily.  Discussed SE of medication.  Labs:  CBC, CMP,  Lipid Panel.  Blood pressure will be reassessed in 4 weeks.

## 2023-05-08 NOTE — ASSESSMENT & PLAN NOTE
TSH elevated on prior lab dated 11/2022.  Continue Levothyroxine 200mg daily.  Lab:  TSH  Will call with result and plan.

## 2023-05-09 DIAGNOSIS — F41.8 MIXED ANXIETY DEPRESSIVE DISORDER: ICD-10-CM

## 2023-05-09 DIAGNOSIS — K31.84 GASTROPARESIS: ICD-10-CM

## 2023-05-09 RX ORDER — VENLAFAXINE HYDROCHLORIDE 150 MG/1
150 CAPSULE, EXTENDED RELEASE ORAL DAILY
Qty: 30 CAPSULE | Refills: 3 | Status: SHIPPED | OUTPATIENT
Start: 2023-05-09

## 2023-05-09 RX ORDER — PANTOPRAZOLE SODIUM 40 MG/1
40 TABLET, DELAYED RELEASE ORAL DAILY
Qty: 30 TABLET | Refills: 2 | Status: SHIPPED | OUTPATIENT
Start: 2023-05-09

## 2023-05-09 NOTE — TELEPHONE ENCOUNTER
Caller: Iris Hyde SOFÍA    Relationship: Self    Best call back number: [phone]    Requested Prescriptions:   Requested Prescriptions     Pending Prescriptions Disp Refills   • venlafaxine XR (EFFEXOR-XR) 150 MG 24 hr capsule 30 capsule 3     Sig: Take 1 capsule by mouth Daily.   • pantoprazole (Protonix) 40 MG EC tablet 30 tablet 2     Sig: Take 1 tablet by mouth Daily.   • diclofenac (VOLTAREN) 50 MG EC tablet 90 tablet 2     Sig: Take 1 tablet by mouth 3 (Three) Times a Day As Needed (Osteoarthritis).        Pharmacy where request should be sent: [facility] [address] [place] [address] [id number] [facility] [phone] [facility] [phone]      Last office visit with prescribing clinician: 5/1/2023   Last telemedicine visit with prescribing clinician: 5/5/2023   Next office visit with prescribing clinician: 6/2/2023     Additional details provided by patient: PATIENT STATES SHE IS COMPLETELY OUT OF THESE PRESCRIPTIONS.     Does the patient have less than a 3 day supply:  [x] Yes  [] No    Would you like a call back once the refill request has been completed: [] Yes [] No    If the office needs to give you a call back, can they leave a voicemail: [] Yes [] No    Quirino Jean Rep   05/09/23 12:30 EDT

## 2023-05-31 ENCOUNTER — TELEPHONE (OUTPATIENT)
Dept: ORTHOPEDIC SURGERY | Facility: CLINIC | Age: 63
End: 2023-05-31

## 2023-05-31 DIAGNOSIS — M16.12 PRIMARY OSTEOARTHRITIS OF LEFT HIP: Primary | ICD-10-CM

## 2023-05-31 RX ORDER — METHYLPREDNISOLONE 4 MG/1
TABLET ORAL
Qty: 21 TABLET | Refills: 0 | Status: SHIPPED | OUTPATIENT
Start: 2023-05-31

## 2023-05-31 NOTE — TELEPHONE ENCOUNTER
Spoke with patient, states she is in a high level of pain. Patient very upset, but willing to try the offered medrol dosepak and referral to pain management to hold her over until her surgery date.

## 2023-05-31 NOTE — TELEPHONE ENCOUNTER
----- Message from YAYA Hargrove sent at 5/31/2023  8:46 AM EDT -----  Regarding: FW: Hip pain   Contact: [phone]        ----- Message -----  From: Daina Bassett MA  Sent: 5/31/2023   7:59 AM EDT  To: YAYA Hargrove  Subject: RE: Hip pain                                     Please see patient messages, thank you     ----- Message -----  From: Iris Hyde  Sent: 5/31/2023   1:57 AM EDT  To: Huyen Mccray [facility]  Subject: Hip pain                                         It’s not just bone pain Keyla. I have nerve pain the runs down my inner thigh. It’s horrible.

## 2023-06-02 ENCOUNTER — OFFICE VISIT (OUTPATIENT)
Dept: FAMILY MEDICINE CLINIC | Facility: CLINIC | Age: 63
End: 2023-06-02
Payer: COMMERCIAL

## 2023-06-02 VITALS
RESPIRATION RATE: 16 BRPM | DIASTOLIC BLOOD PRESSURE: 84 MMHG | WEIGHT: 293 LBS | OXYGEN SATURATION: 98 % | HEART RATE: 86 BPM | HEIGHT: 67 IN | BODY MASS INDEX: 45.99 KG/M2 | SYSTOLIC BLOOD PRESSURE: 132 MMHG

## 2023-06-02 DIAGNOSIS — B35.1 ONYCHOMYCOSIS OF TOENAIL: ICD-10-CM

## 2023-06-02 DIAGNOSIS — E78.2 MIXED HYPERLIPIDEMIA: ICD-10-CM

## 2023-06-02 DIAGNOSIS — Z53.20 STATIN MEDICATION DECLINED BY PATIENT: ICD-10-CM

## 2023-06-02 DIAGNOSIS — M79.7 FIBROMYALGIA: ICD-10-CM

## 2023-06-02 DIAGNOSIS — F17.210 CIGARETTE SMOKER: ICD-10-CM

## 2023-06-02 DIAGNOSIS — L85.3 XEROSIS OF SKIN: ICD-10-CM

## 2023-06-02 DIAGNOSIS — I10 ESSENTIAL HYPERTENSION: Primary | ICD-10-CM

## 2023-06-02 PROCEDURE — 99214 OFFICE O/P EST MOD 30 MIN: CPT | Performed by: NURSE PRACTITIONER

## 2023-06-02 RX ORDER — LOSARTAN POTASSIUM 25 MG/1
25 TABLET ORAL DAILY
Qty: 90 TABLET | Refills: 1 | Status: SHIPPED | OUTPATIENT
Start: 2023-06-02

## 2023-06-02 RX ORDER — CYCLOBENZAPRINE HCL 10 MG
10 TABLET ORAL 3 TIMES DAILY PRN
Qty: 90 TABLET | Refills: 3 | Status: SHIPPED | OUTPATIENT
Start: 2023-06-02

## 2023-06-02 RX ORDER — VARENICLINE TARTRATE 1 MG/1
1 TABLET, FILM COATED ORAL 2 TIMES DAILY
Qty: 60 TABLET | Refills: 2 | Status: SHIPPED | OUTPATIENT
Start: 2023-06-02

## 2023-06-02 NOTE — PROGRESS NOTES
"Chief Complaint  Hypertension (Follow up )    Subjective          Iris Hyde presents to [facility] for  History of Present Illness  She is here to follow-up on HYPERTENSION:    HYPERTENSION - she is currently taking Losartan 25mg daily.  She denies SOB, chest pain, heart palpitations, lightheadedness, and dizziness.     Current Smoker - she currently smokes 1/2 ppd.  She was given Chantix at prior visit and is still taking it while continuing to smoke 1/2 ppd.    Toenail fungus - she has had thick toenails on all toes with scaling skin over both feet for about 15 years and is requesting referral to podiatry.  She has not used any over the counter medication/cream to help condition.    Fibromyalgia - she currently takes Cyclobenzaprine PRN and it helps her with fibromyalgia.    She is requesting refills of her Losartan, Chantix and Cyclobenzaprine today.            Review of Systems   Respiratory:  Positive for cough. Negative for shortness of breath.         She reports having chronic intermittent smokers cough but nothing unusual.   Cardiovascular:  Positive for leg swelling. Negative for chest pain and palpitations.   Musculoskeletal:  Positive for myalgias.        Have fibromyalgia   Skin:         Thick toenails with scaling skin all over foot/toes.   Neurological:  Negative for dizziness and light-headedness.       Objective   Vital Signs:   /84 (BP Location: Left arm, Patient Position: Sitting, Cuff Size: Adult)   Pulse 86   Resp 16   Ht 170 cm (66.93\")   Wt 133 kg (293 lb)   SpO2 98%   BMI 45.99 kg/m²     Physical Exam  Vitals and nursing note reviewed.   Constitutional:       General: She is not in acute distress.     Appearance: Normal appearance.   HENT:      Head: Normocephalic and atraumatic.   Eyes:      Conjunctiva/sclera: Conjunctivae normal.      Pupils: Pupils are equal, round, and reactive to light.   Cardiovascular:      Rate and Rhythm: Normal rate " and regular rhythm.      Heart sounds: Normal heart sounds. No murmur heard.  Pulmonary:      Effort: Pulmonary effort is normal. No respiratory distress.      Breath sounds: Normal breath sounds. No wheezing.   Musculoskeletal:      Right lower leg: No edema.      Left lower leg: No edema.      Comments: C/O generalized muscle aches   Skin:     General: Skin is warm and dry.      Findings: No erythema.      Comments: All toenails are thick with cream colored toenails and severe scaling skin over toes and feet.   Neurological:      General: No focal deficit present.      Mental Status: She is alert.   Psychiatric:         Mood and Affect: Mood normal.    The 10-year ASCVD risk score (Larry TUCKER, et al., 2019) is: 23.5%    Values used to calculate the score:      Age: 63 years      Sex: Female      Is Non- : No      Diabetic: Yes      Tobacco smoker: Yes      Systolic Blood Pressure: 132 mmHg      Is BP treated: Yes      HDL Cholesterol: 48 mg/dL      Total Cholesterol: 219 mg/dL    Result Review :   The following data was reviewed by: YAYA Morgan on 06/02/2023:  CMP          11/14/2022    11:39 5/1/2023    14:03   CMP   Glucose 101  101    BUN 11  13    Creatinine 0.95  1.07    EGFR 68.3     Sodium 142  142    Potassium 3.5  3.5    Chloride 104  103    Calcium 9.0  9.9    Total Protein  7.4    Total Protein 7.0     Albumin 4.00  4.5    Globulin  2.9    Globulin 3.0     Total Bilirubin 0.2  0.4    Alkaline Phosphatase 119  133    AST (SGOT) 22  18    ALT (SGPT) 15  17    Albumin/Globulin Ratio 1.3     BUN/Creatinine Ratio 11.6  12.1    Anion Gap 14.0       Lipid Panel          11/28/2022    11:18 5/1/2023    14:03   Lipid Panel   Total Cholesterol 225  219    Triglycerides 141  117    HDL Cholesterol 56  48    VLDL Cholesterol 25  21    LDL Cholesterol  144  150                Assessment and Plan    Diagnoses and all orders for this visit:    1. Essential hypertension  (Primary)  Assessment & Plan:  Hypertension is improving with treatment.  Continue current treatment regimen.  Dietary sodium restriction.  Weight loss.  Regular aerobic exercise.  Stop smoking.  Continue Losartan 25mg daily.  Blood pressure will be reassessed in 3 months.    Orders:  -     losartan (Cozaar) 25 MG tablet; Take 1 tablet by mouth Daily.  Dispense: 90 tablet; Refill: 1    2. Mixed hyperlipidemia  Assessment & Plan:  Lipid abnormalities are  elevated.  Patient declined statin medication .  Nutritional counseling was provided.  Discussed importance of making lifestyle changes (diet/exercise).  Lipids will be reassessed in 3 months.      3. Statin medication declined by patient    4. Onychomycosis of toenail  Comments:  Creamy discoloration and thick toenails.  Referral to Podiatry for evaluation and treatment.  Orders:  -     Ambulatory Referral to Podiatry    5. Xerosis of skin  Comments:  Dry, flakey/scaly skin over bilateral feet.  Keep feet clean & dry   Apply Aquaphor Ointment BID  Referral to podiatry for evaluation and treatment.  Orders:  -     Ambulatory Referral to Podiatry    6. Cigarette smoker  Comments:  Currently smoking 1/2 ppd.  Requested refill Chantix  Discussed importance of quitting smoking.  Orders:  -     varenicline (CHANTIX) 1 MG tablet; Take 1 tablet by mouth 2 (Two) Times a Day.  Dispense: 60 tablet; Refill: 2    7. Fibromyalgia  Comments:  C/O generalized muscle aches.  RX: Cyclobenzaprine PRN  Orders:  -     cyclobenzaprine (FLEXERIL) 10 MG tablet; Take 1 tablet by mouth 3 (Three) Times a Day As Needed for Muscle Spasms.  Dispense: 90 tablet; Refill: 3        Follow Up   Return in about 3 months (around 9/2/2023) for Next scheduled follow up HTN, HLD, Thyroid.  Patient was given instructions and counseling regarding her condition or for health maintenance advice. Please see specific information pulled into the AVS if appropriate.

## 2023-06-08 ENCOUNTER — HOSPITAL ENCOUNTER (OUTPATIENT)
Dept: GENERAL RADIOLOGY | Facility: HOSPITAL | Age: 63
Discharge: HOME | End: 2023-06-08
Payer: COMMERCIAL

## 2023-06-08 DIAGNOSIS — M16.12 PRIMARY OSTEOARTHRITIS OF LEFT HIP: ICD-10-CM

## 2023-06-08 PROCEDURE — 25510000001 IOPAMIDOL 61 % SOLUTION: Performed by: NURSE PRACTITIONER

## 2023-06-08 PROCEDURE — 0 LIDOCAINE 1 % SOLUTION: Performed by: NURSE PRACTITIONER

## 2023-06-08 PROCEDURE — 77002 NEEDLE LOCALIZATION BY XRAY: CPT

## 2023-06-08 PROCEDURE — 25010000002 METHYLPREDNISOLONE PER 125 MG: Performed by: NURSE PRACTITIONER

## 2023-06-08 RX ORDER — LIDOCAINE HYDROCHLORIDE 10 MG/ML
20 INJECTION, SOLUTION INFILTRATION; PERINEURAL ONCE
Status: COMPLETED | OUTPATIENT
Start: 2023-06-08 | End: 2023-06-08

## 2023-06-08 RX ORDER — BUPIVACAINE HYDROCHLORIDE 2.5 MG/ML
10 INJECTION, SOLUTION EPIDURAL; INFILTRATION; INTRACAUDAL ONCE
Status: COMPLETED | OUTPATIENT
Start: 2023-06-08 | End: 2023-06-08

## 2023-06-08 RX ORDER — METHYLPREDNISOLONE SODIUM SUCCINATE 125 MG/2ML
80 INJECTION, POWDER, LYOPHILIZED, FOR SOLUTION INTRAMUSCULAR; INTRAVENOUS
Status: COMPLETED | OUTPATIENT
Start: 2023-06-08 | End: 2023-06-08

## 2023-06-08 RX ADMIN — IOPAMIDOL 1 ML: 612 INJECTION, SOLUTION INTRAVENOUS at 09:48

## 2023-06-08 RX ADMIN — BUPIVACAINE HYDROCHLORIDE 5 ML: 2.5 INJECTION, SOLUTION EPIDURAL; INFILTRATION; INTRACAUDAL; PERINEURAL at 09:48

## 2023-06-08 RX ADMIN — METHYLPREDNISOLONE SODIUM SUCCINATE 80 MG: 125 INJECTION, POWDER, LYOPHILIZED, FOR SOLUTION INTRAMUSCULAR; INTRAVENOUS at 09:48

## 2023-06-08 RX ADMIN — LIDOCAINE HYDROCHLORIDE 4 ML: 10 INJECTION, SOLUTION INFILTRATION; PERINEURAL at 09:48

## 2023-06-11 PROBLEM — E78.2 MIXED HYPERLIPIDEMIA: Status: ACTIVE | Noted: 2023-06-11

## 2023-06-11 NOTE — ASSESSMENT & PLAN NOTE
Hypertension is improving with treatment.  Continue current treatment regimen.  Dietary sodium restriction.  Weight loss.  Regular aerobic exercise.  Stop smoking.  Continue Losartan 25mg daily.  Blood pressure will be reassessed in 3 months.

## 2023-06-11 NOTE — ASSESSMENT & PLAN NOTE
Lipid abnormalities are  elevated.  Patient declined statin medication .  Nutritional counseling was provided.  Discussed importance of making lifestyle changes (diet/exercise).  Lipids will be reassessed in 3 months.

## 2023-06-15 ENCOUNTER — PRE-ADMISSION TESTING (OUTPATIENT)
Dept: PREADMISSION TESTING | Facility: HOSPITAL | Age: 63
End: 2023-06-15
Payer: COMMERCIAL

## 2023-06-15 VITALS
SYSTOLIC BLOOD PRESSURE: 138 MMHG | RESPIRATION RATE: 18 BRPM | TEMPERATURE: 98.4 F | HEART RATE: 89 BPM | DIASTOLIC BLOOD PRESSURE: 94 MMHG | WEIGHT: 281.1 LBS | OXYGEN SATURATION: 98 % | HEIGHT: 67 IN | BODY MASS INDEX: 44.12 KG/M2

## 2023-06-15 LAB
ANION GAP SERPL CALCULATED.3IONS-SCNC: 10 MMOL/L (ref 5–15)
BUN SERPL-MCNC: 17 MG/DL (ref 8–23)
BUN/CREAT SERPL: 19.5 (ref 7–25)
CALCIUM SPEC-SCNC: 8.9 MG/DL (ref 8.6–10.5)
CHLORIDE SERPL-SCNC: 104 MMOL/L (ref 98–107)
CO2 SERPL-SCNC: 28 MMOL/L (ref 22–29)
CREAT SERPL-MCNC: 0.87 MG/DL (ref 0.57–1)
DEPRECATED RDW RBC AUTO: 43.6 FL (ref 37–54)
EGFRCR SERPLBLD CKD-EPI 2021: 75.4 ML/MIN/1.73
ERYTHROCYTE [DISTWIDTH] IN BLOOD BY AUTOMATED COUNT: 12.7 % (ref 12.3–15.4)
GLUCOSE SERPL-MCNC: 102 MG/DL (ref 65–99)
HCT VFR BLD AUTO: 43.7 % (ref 34–46.6)
HGB BLD-MCNC: 15.5 G/DL (ref 12–15.9)
MCH RBC QN AUTO: 33.3 PG (ref 26.6–33)
MCHC RBC AUTO-ENTMCNC: 35.5 G/DL (ref 31.5–35.7)
MCV RBC AUTO: 94 FL (ref 79–97)
PLATELET # BLD AUTO: 292 10*3/MM3 (ref 140–450)
PMV BLD AUTO: 10 FL (ref 6–12)
POTASSIUM SERPL-SCNC: 4.3 MMOL/L (ref 3.5–5.2)
QT INTERVAL: 433 MS
RBC # BLD AUTO: 4.65 10*6/MM3 (ref 3.77–5.28)
SODIUM SERPL-SCNC: 142 MMOL/L (ref 136–145)
WBC NRBC COR # BLD: 11.33 10*3/MM3 (ref 3.4–10.8)

## 2023-06-15 PROCEDURE — 36415 COLL VENOUS BLD VENIPUNCTURE: CPT

## 2023-06-15 PROCEDURE — 80048 BASIC METABOLIC PNL TOTAL CA: CPT

## 2023-06-15 PROCEDURE — 85027 COMPLETE CBC AUTOMATED: CPT

## 2023-06-15 PROCEDURE — 93005 ELECTROCARDIOGRAM TRACING: CPT

## 2023-06-15 PROCEDURE — 93010 ELECTROCARDIOGRAM REPORT: CPT | Performed by: INTERNAL MEDICINE

## 2023-06-15 NOTE — DISCHARGE INSTRUCTIONS
Take the following medications the morning of surgery:  BRING INHALER WITH YOU    If you are on prescription narcotic pain medication to control your pain you may also take that medication the morning of surgery.    General Instructions:  Do not eat solid food after midnight the night before surgery.  You may drink clear liquids day of surgery but must stop at least one hour before your hospital arrival time.  It is beneficial for you to have a clear drink that contains carbohydrates the day of surgery.  We suggest a 12 to 20 ounce bottle of Gatorade or Powerade for non-diabetic patients or a 12 to 20 ounce bottle of G2 or Powerade Zero for diabetic patients. (Pediatric patients, are not advised to drink a 12 to 20 ounce carbohydrate drink)    Clear liquids are liquids you can see through.  Nothing red in color.     Plain water                               Sports drinks  Sodas                                   Gelatin (Jell-O)  Fruit juices without pulp such as white grape juice and apple juice  Popsicles that contain no fruit or yogurt  Tea or coffee (no cream or milk added)  Gatorade / Powerade  G2 / Powerade Zero    Infants may have breast milk up to four hours before surgery.  Infants drinking formula may drink formula up to six hours before surgery.   Patients who avoid smoking, chewing tobacco and alcohol for 4 weeks prior to surgery have a reduced risk of post-operative complications.  Quit smoking as many days before surgery as you can.  Do not smoke, use chewing tobacco or drink alcohol the day of surgery.   If applicable bring your C-PAP/ BI-PAP machine in with you to preop day of surgery.  Bring any papers given to you in the doctor’s office.  Wear clean comfortable clothes.  Do not wear contact lenses, false eyelashes or make-up.  Bring a case for your glasses.   Bring crutches or walker if applicable.  Remove all piercings.  Leave jewelry and any other valuables at home.  Hair extensions with metal  clips must be removed prior to surgery.  The Pre-Admission Testing nurse will instruct you to bring medications if unable to obtain an accurate list in Pre-Admission Testing.        If you were given a blood bank ID arm band remember to bring it with you the day of surgery.    Preventing a Surgical Site Infection:  For 2 to 3 days before surgery, avoid shaving with a razor because the razor can irritate skin and make it easier to develop an infection.    Any areas of open skin can increase the risk of a post-operative wound infection by allowing bacteria to enter and travel throughout the body.  Notify your surgeon if you have any skin wounds / rashes even if it is not near the expected surgical site.  The area will need assessed to determine if surgery should be delayed until it is healed.  The night prior to surgery shower using a fresh bar of anti-bacterial soap (such as Dial) and clean washcloth.  Sleep in a clean bed with clean clothing.  Do not allow pets to sleep with you.  Shower on the morning of surgery using a fresh bar of anti-bacterial soap (such as Dial) and clean washcloth.  Dry with a clean towel and dress in clean clothing.  Ask your surgeon if you will be receiving antibiotics prior to surgery.  Make sure you, your family, and all healthcare providers clean their hands with soap and water or an alcohol based hand  before caring for you or your wound.    Day of surgery: 6/26/2023 PT WILL BE NOTIFIED OF ARRIVAL TIME  Your arrival time is approximately two hours before your scheduled surgery time.  Upon arrival, a Pre-op nurse and Anesthesiologist will review your health history, obtain vital signs, and answer questions you may have.  The only belongings needed at this time will be a list of your home medications and if applicable your C-PAP/BI-PAP machine.  A Pre-op nurse will start an IV and you may receive medication in preparation for surgery, including something to help you relax.      Please be aware that surgery does come with discomfort.  We want to make every effort to control your discomfort so please discuss any uncontrolled symptoms with your nurse.   Your doctor will most likely have prescribed pain medications.      If you are going home after surgery you will receive individualized written care instructions before being discharged.  A responsible adult must drive you to and from the hospital on the day of your surgery and stay with you for 24 hours.  Discharge prescriptions can be filled by the hospital pharmacy during regular pharmacy hours.  If you are having surgery late in the day/evening your prescription may be e-prescribed to your pharmacy.  Please verify your pharmacy hours or chose a 24 hour pharmacy to avoid not having access to your prescription because your pharmacy has closed for the day.    If you are staying overnight following surgery, you will be transported to your hospital room following the recovery period.  [facility] has all private rooms.    If you have any questions please call Pre-Admission Testing at [phone].  Deductibles and co-payments are collected on the day of service. Please be prepared to pay the required co-pay, deductible or deposit on the day of service as defined by your plan.    Call your surgeon immediately if you experience any of the following symptoms:  Sore Throat  Shortness of Breath or difficulty breathing  Cough  Chills  Body soreness or muscle pain  Headache  Fever  New loss of taste or smell  Do not arrive for your surgery ill.  Your procedure will need to be rescheduled to another time.  You will need to call your physician before the day of surgery to avoid any unnecessary exposure to hospital staff as well as other patients.  CHLORHEXIDINE CLOTH INSTRUCTIONS  The morning of surgery follow these instructions using the Chlorhexidine cloths you've been given.  These steps reduce bacteria on the body.  Do not use the  cloths near your eyes, ears mouth, genitalia or on open wounds.  Throw the cloths away after use but do not try to flush them down a toilet.      Open and remove one cloth at a time from the package.    Leave the cloth unfolded and begin the bathing.  Massage the skin with the cloths using gentle pressure to remove bacteria.  Do not scrub harshly.   Follow the steps below with one 2% CHG cloth per area (6 total cloths).  One cloth for neck, shoulders and chest.  One cloth for both arms, hands, fingers and underarms (do underarms last).  One cloth for the abdomen followed by groin.  One cloth for right leg and foot including between the toes.  One cloth for left leg and foot including between the toes.  The last cloth is to be used for the back of the neck, back and buttocks.    Allow the CHG to air dry 3 minutes on the skin which will give it time to work and decrease the chance of irritation.  The skin may feel sticky until it is dry.  Do not rinse with water or any other liquid or you will lose the beneficial effects of the CHG.  If mild skin irritation occurs, do rinse the skin to remove the CHG.  Report this to the nurse at time of admission.  Do not apply lotions, creams, ointments, deodorants or perfumes after using the clothes. Dress in clean clothes before coming to the hospital.

## 2023-08-21 DIAGNOSIS — F17.210 CIGARETTE SMOKER: ICD-10-CM

## 2023-08-22 ENCOUNTER — OFFICE VISIT (OUTPATIENT)
Dept: ORTHOPEDIC SURGERY | Facility: CLINIC | Age: 63
End: 2023-08-22
Payer: COMMERCIAL

## 2023-08-22 VITALS — WEIGHT: 285.4 LBS | TEMPERATURE: 96.2 F | HEIGHT: 67 IN | BODY MASS INDEX: 44.8 KG/M2

## 2023-08-22 DIAGNOSIS — Z96.642 STATUS POST TOTAL HIP REPLACEMENT, LEFT: ICD-10-CM

## 2023-08-22 DIAGNOSIS — M16.11 PRIMARY OSTEOARTHRITIS OF RIGHT HIP: ICD-10-CM

## 2023-08-22 DIAGNOSIS — R52 PAIN: Primary | ICD-10-CM

## 2023-08-22 RX ORDER — VARENICLINE TARTRATE 1 MG/1
TABLET, FILM COATED ORAL
Qty: 60 TABLET | Refills: 0 | Status: SHIPPED | OUTPATIENT
Start: 2023-08-22

## 2023-08-22 RX ORDER — CHLORHEXIDINE GLUCONATE 500 MG/1
CLOTH TOPICAL 2 TIMES DAILY
OUTPATIENT
Start: 2023-08-22

## 2023-08-22 RX ORDER — MELOXICAM 7.5 MG/1
15 TABLET ORAL ONCE
OUTPATIENT
Start: 2023-08-22 | End: 2023-08-22

## 2023-08-22 RX ORDER — PREGABALIN 150 MG/1
150 CAPSULE ORAL ONCE
OUTPATIENT
Start: 2023-08-22 | End: 2023-08-22

## 2023-08-22 NOTE — PROGRESS NOTES
Iris Hyde : 1960 MRN: [id number] DATE: 2023    DIAGNOSIS: 8 week follow up left total hip Posterior Lateral Approach /right hip pain    SUBJECTIVE:Patient returns today for 8 week follow up of left total hip replacement as well as worsening right hip symptoms.  In regards to the patient's left hip she reports that her pain is tolerable.  She states that she is done with physical therapy and is doing home exercises.  Patient developed reports that she has significant limitations mostly due to her right hip.  Patient has advanced right hip osteoarthritis as well and reports that the pain in her right hip is a 9 out of 10.  Patient states that she has a constant severe ache in her groin that is greatly affecting her quality of life.  Patient states that if she can get the right hip pain under control she can get back to a normal life.  Patient reports that she is still having to use a cane for ambulatory purposes due to the amount of pain in her right hip.  She denies any signs or symptoms of infection, and she is without any other significant complaints today.    OBJECTIVE:   Exam:. (Left hip) the incision is healed. No sign of infection. Range of motion is progressing as expected. The calf is soft and nontender with a negative Homans sign. Strength progressing.    Hip:  right    LEG ALIGNMENT:     Neutral        LEG LENGTH DISCREPANCY   :    none    GAIT:     Antalgic    SKIN:     No abnormality    RANGE OF MOTION:     Limited by joint irritability    STRENGTH:     Limited by joint irratibility    DISTAL PULSES:    Paplable    DISTAL SENSATION :   Intact    LYMPHATICS:     No   lymphadenopathy    OTHER:          +   Stinchfeld test      -    log roll      -   Tenderness to palpation trochanteric bursa       DIAGNOSTIC STUDIES  Xrays: 2 views of the left hip (AP pelvis and lateral left hip) were ordered and reviewed for evaluation of recent hip replacement. They demonstrate a well positioned, well  aligned hip replacement without complicating factors noted. In comparison with previous films there has been interval implant placement.  Is noted on the patient's AP view that she has advanced osteoarthritis of the right hip with bone-on-bone articulation, periarticular osteophytes present and impingement morphology.     ASSESSMENT: 8 week status post left hip replacement Posterior Lateral Approach /primary osteoarthritis right hip    Continuation of conservative management vs. ALLISON discussed.  The patient wishes to proceed with total hip replacement.  At this point the patient has failed the full gamut of conservative treatment and stating complete understanding of the risks/benefits/ anternatives wishes to proceed with surgical treatment.    Risk and benefits of surgery were reviewed.  Including, but not limited to, blood clots, anesthesia risk, infection, leg length discrepancy, fracture, skin/leg numbness, failure of the implant, need for future surgeries, continued pain, hematoma, need for transfusion, and death, among others.  The patient understands and wishes to proceed.     The spectrum of treatment options were discussed with the patient in detail including both the nonoperative and operative treatment modalities and their respective risks and benefits.  After thorough discussion, the patient has elected to undergo surgical treatment.  The details of the surgical procedure were explained including the location of probable incisions and a description of the likely implants to be used.  Models and diagrams were used as educational resources. The patient understands the likely convalescence after surgery, as well as the rehabilitation required.  We thoroughly discussed the risks, benefits, and alternatives to surgery.  The risks include but are not limited to the risk of infection, joint stiffness, blood clots (including DVT and/or pulmonary embolus along with the risk of death), neurologic and/or vascular  injury, fracture, dislocation, nonunion, malunion, need for further surgery including hardware failure requiring revision, and continued pain.  It was explained that if tissue has been repaired or reconstructed, there is also a chance of failure which may require further management.  Following the completion of the discussion, the patient expressed understanding of this planned course of care, all their questions were answered and consent will be obtained preoperatively.    Operative Plan: Posterior approach Total Hip Replacement 23 HR STAY.  Patient does not need any medical clearances before proceeding forward surgery.  I have encouraged the patient to continue with her weight loss purposes and to keep her BMI under 45.        PLAN:   Juan Pablo Mike, APRN  8/22/2023

## 2023-08-24 RX ORDER — PROMETHAZINE HYDROCHLORIDE 25 MG/1
25 TABLET ORAL EVERY 6 HOURS PRN
Qty: 90 TABLET | Refills: 2 | Status: SHIPPED | OUTPATIENT
Start: 2023-08-24

## 2023-08-24 NOTE — TELEPHONE ENCOUNTER
Caller: Hyde Iris J    Relationship: Self    Best call back number: [phone]     Requested Prescriptions:   Requested Prescriptions     Pending Prescriptions Disp Refills    promethazine (PHENERGAN) 25 MG tablet 90 tablet 2     Sig: Take 1 tablet by mouth Every 6 (Six) Hours As Needed for Nausea or Vomiting (Gastroparesis).        Pharmacy where request should be sent: [facility] [address] [place] [address] [address] [facility] [phone] [facility] [phone]      Last office visit with prescribing clinician: 6/2/2023   Last telemedicine visit with prescribing clinician: Visit date not found   Next office visit with prescribing clinician: 9/11/2023     Additional details provided by patient: PATIENT IS OUT OF MEDICATION    Does the patient have less than a 3 day supply:  [x] Yes  [] No    Would you like a call back once the refill request has been completed: [] Yes [x] No    If the office needs to give you a call back, can they leave a voicemail: [] Yes [x] No    Quirino Payne Rep   08/24/23 14:17 EDT

## 2023-08-30 PROBLEM — M16.11 PRIMARY OSTEOARTHRITIS OF RIGHT HIP: Status: ACTIVE | Noted: 2023-08-30

## 2023-09-20 ENCOUNTER — DOCUMENTATION (OUTPATIENT)
Dept: PHYSICAL THERAPY | Facility: CLINIC | Age: 63
End: 2023-09-20
Payer: COMMERCIAL

## 2023-10-08 DIAGNOSIS — F41.8 MIXED ANXIETY DEPRESSIVE DISORDER: ICD-10-CM

## 2023-10-08 DIAGNOSIS — M79.7 FIBROMYALGIA: ICD-10-CM

## 2023-10-09 RX ORDER — CYCLOBENZAPRINE HCL 10 MG
10 TABLET ORAL 3 TIMES DAILY PRN
Qty: 90 TABLET | Refills: 0 | Status: SHIPPED | OUTPATIENT
Start: 2023-10-09

## 2023-10-09 RX ORDER — VENLAFAXINE HYDROCHLORIDE 150 MG/1
150 CAPSULE, EXTENDED RELEASE ORAL DAILY
Qty: 30 CAPSULE | Refills: 0 | Status: SHIPPED | OUTPATIENT
Start: 2023-10-09

## 2023-10-23 PROBLEM — Z86.0100 HISTORY OF COLON POLYPS: Status: ACTIVE | Noted: 2023-03-16

## 2023-10-23 PROBLEM — Z86.010 HISTORY OF COLON POLYPS: Status: ACTIVE | Noted: 2023-03-16

## 2023-11-07 ENCOUNTER — PRE-ADMISSION TESTING (OUTPATIENT)
Dept: PREADMISSION TESTING | Facility: HOSPITAL | Age: 63
End: 2023-11-07
Payer: COMMERCIAL

## 2023-11-07 VITALS
SYSTOLIC BLOOD PRESSURE: 151 MMHG | WEIGHT: 293 LBS | TEMPERATURE: 97.3 F | RESPIRATION RATE: 18 BRPM | BODY MASS INDEX: 47.09 KG/M2 | HEART RATE: 96 BPM | HEIGHT: 66 IN | OXYGEN SATURATION: 96 % | DIASTOLIC BLOOD PRESSURE: 100 MMHG

## 2023-11-07 LAB
ANION GAP SERPL CALCULATED.3IONS-SCNC: 8 MMOL/L (ref 5–15)
BUN SERPL-MCNC: 17 MG/DL (ref 8–23)
BUN/CREAT SERPL: 20.2 (ref 7–25)
CALCIUM SPEC-SCNC: 8.9 MG/DL (ref 8.6–10.5)
CHLORIDE SERPL-SCNC: 106 MMOL/L (ref 98–107)
CO2 SERPL-SCNC: 27 MMOL/L (ref 22–29)
CREAT SERPL-MCNC: 0.84 MG/DL (ref 0.57–1)
DEPRECATED RDW RBC AUTO: 44 FL (ref 37–54)
EGFRCR SERPLBLD CKD-EPI 2021: 78.7 ML/MIN/1.73
ERYTHROCYTE [DISTWIDTH] IN BLOOD BY AUTOMATED COUNT: 13.5 % (ref 12.3–15.4)
GLUCOSE SERPL-MCNC: 129 MG/DL (ref 65–99)
HCT VFR BLD AUTO: 41.3 % (ref 34–46.6)
HGB BLD-MCNC: 13.8 G/DL (ref 12–15.9)
MCH RBC QN AUTO: 30 PG (ref 26.6–33)
MCHC RBC AUTO-ENTMCNC: 33.4 G/DL (ref 31.5–35.7)
MCV RBC AUTO: 89.8 FL (ref 79–97)
PLATELET # BLD AUTO: 242 10*3/MM3 (ref 140–450)
PMV BLD AUTO: 9.8 FL (ref 6–12)
POTASSIUM SERPL-SCNC: 4.2 MMOL/L (ref 3.5–5.2)
RBC # BLD AUTO: 4.6 10*6/MM3 (ref 3.77–5.28)
SODIUM SERPL-SCNC: 141 MMOL/L (ref 136–145)
WBC NRBC COR # BLD: 7.12 10*3/MM3 (ref 3.4–10.8)

## 2023-11-07 PROCEDURE — 36415 COLL VENOUS BLD VENIPUNCTURE: CPT

## 2023-11-07 PROCEDURE — 85027 COMPLETE CBC AUTOMATED: CPT

## 2023-11-07 PROCEDURE — 80048 BASIC METABOLIC PNL TOTAL CA: CPT

## 2023-11-07 RX ORDER — GUAIFENESIN 200 MG/1
400 TABLET ORAL EVERY 4 HOURS PRN
COMMUNITY

## 2023-11-07 RX ORDER — NAPROXEN SODIUM 220 MG
220 TABLET ORAL 2 TIMES DAILY PRN
COMMUNITY

## 2023-11-07 NOTE — DISCHARGE INSTRUCTIONS
Take the following medications the morning of surgery: NONE    ARRIVAL TIME TO BE DETERMINED. YOU WILL BE CALLED ONE TO TWO DAYS PRIOR TO SURGERY    BRING CPAP MACHINE AND ALBUTEROL INHALER WITH YOU TO PREOP      If you are on prescription narcotic pain medication to control your pain you may also take that medication the morning of surgery.    General Instructions:  Do not eat solid food after midnight the night before surgery.  You may drink clear liquids day of surgery but must stop at least one hour before your hospital arrival time.  It is beneficial for you to have a clear drink that contains carbohydrates the day of surgery.  We suggest a 12 to 20 ounce bottle of Gatorade or Powerade for non-diabetic patients or a 12 to 20 ounce bottle of G2 or Powerade Zero for diabetic patients. (Pediatric patients, are not advised to drink a 12 to 20 ounce carbohydrate drink)    Clear liquids are liquids you can see through.  Nothing red in color.     Plain water                               Sports drinks  Sodas                                   Gelatin (Jell-O)  Fruit juices without pulp such as white grape juice and apple juice  Popsicles that contain no fruit or yogurt  Tea or coffee (no cream or milk added)  Gatorade / Powerade  G2 / Powerade Zero      Patients who avoid smoking, chewing tobacco and alcohol for 4 weeks prior to surgery have a reduced risk of post-operative complications.  Quit smoking as many days before surgery as you can.  Do not smoke, use chewing tobacco or drink alcohol the day of surgery.   If applicable bring your C-PAP/ BI-PAP machine in with you to preop day of surgery.  Bring any papers given to you in the doctor’s office.  Wear clean comfortable clothes.  Do not wear contact lenses, false eyelashes or make-up.  Bring a case for your glasses.   Bring crutches or walker if applicable.  Remove all piercings.  Leave jewelry and any other valuables at home.  Hair extensions with metal clips must  be removed prior to surgery.  The Pre-Admission Testing nurse will instruct you to bring medications if unable to obtain an accurate list in Pre-Admission Testing.          Preventing a Surgical Site Infection:  For 2 to 3 days before surgery, avoid shaving with a razor because the razor can irritate skin and make it easier to develop an infection.    Any areas of open skin can increase the risk of a post-operative wound infection by allowing bacteria to enter and travel throughout the body.  Notify your surgeon if you have any skin wounds / rashes even if it is not near the expected surgical site.  The area will need assessed to determine if surgery should be delayed until it is healed.  The night prior to surgery shower using a fresh bar of anti-bacterial soap (such as Dial) and clean washcloth.  Sleep in a clean bed with clean clothing.  Do not allow pets to sleep with you.  Shower on the morning of surgery using a fresh bar of anti-bacterial soap (such as Dial) and clean washcloth.  Dry with a clean towel and dress in clean clothing.  Ask your surgeon if you will be receiving antibiotics prior to surgery.  Make sure you, your family, and all healthcare providers clean their hands with soap and water or an alcohol based hand  before caring for you or your wound.      CHLORHEXIDINE CLOTH INSTRUCTIONS  The morning of surgery follow these instructions using the Chlorhexidine cloths you've been given.  These steps reduce bacteria on the body.  Do not use the cloths near your eyes, ears mouth, genitalia or on open wounds.  Throw the cloths away after use but do not try to flush them down a toilet.      Open and remove one cloth at a time from the package.    Leave the cloth unfolded and begin the bathing.  Massage the skin with the cloths using gentle pressure to remove bacteria.  Do not scrub harshly.   Follow the steps below with one 2% CHG cloth per area (6 total cloths).  One cloth for neck, shoulders and  chest.  One cloth for both arms, hands, fingers and underarms (do underarms last).  One cloth for the abdomen followed by groin.  One cloth for right leg and foot including between the toes.  One cloth for left leg and foot including between the toes.  The last cloth is to be used for the back of the neck, back and buttocks.    Allow the CHG to air dry 3 minutes on the skin which will give it time to work and decrease the chance of irritation.  The skin may feel sticky until it is dry.  Do not rinse with water or any other liquid or you will lose the beneficial effects of the CHG.  If mild skin irritation occurs, do rinse the skin to remove the CHG.  Report this to the nurse at time of admission.  Do not apply lotions, creams, ointments, deodorants or perfumes after using the clothes. Dress in clean clothes before coming to the hospital.      Day of surgery:  Your arrival time is approximately two hours before your scheduled surgery time.  Upon arrival, a Pre-op nurse and Anesthesiologist will review your health history, obtain vital signs, and answer questions you may have.  The only belongings needed at this time will be a list of your home medications and if applicable your C-PAP/BI-PAP machine.  A Pre-op nurse will start an IV and you may receive medication in preparation for surgery, including something to help you relax.     Please be aware that surgery does come with discomfort.  We want to make every effort to control your discomfort so please discuss any uncontrolled symptoms with your nurse.   Your doctor will most likely have prescribed pain medications.      If you are going home after surgery you will receive individualized written care instructions before being discharged.  A responsible adult must drive you to and from the hospital on the day of your surgery and stay with you for 24 hours.  Discharge prescriptions can be filled by the hospital pharmacy during regular pharmacy hours.  If you are having  surgery late in the day/evening your prescription may be e-prescribed to your pharmacy.  Please verify your pharmacy hours or chose a 24 hour pharmacy to avoid not having access to your prescription because your pharmacy has closed for the day.    If you are staying overnight following surgery, you will be transported to your hospital room following the recovery period.  [facility] has all private rooms.    If you have any questions please call Pre-Admission Testing at [phone].  Deductibles and co-payments are collected on the day of service. Please be prepared to pay the required co-pay, deductible or deposit on the day of service as defined by your plan.    Call your surgeon immediately if you experience any of the following symptoms:  Sore Throat  Shortness of Breath or difficulty breathing  Cough  Chills  Body soreness or muscle pain  Headache  Fever  New loss of taste or smell  Do not arrive for your surgery ill.  Your procedure will need to be rescheduled to another time.  You will need to call your physician before the day of surgery to avoid any unnecessary exposure to hospital staff as well as other patients.

## 2023-11-08 DIAGNOSIS — F41.8 MIXED ANXIETY DEPRESSIVE DISORDER: ICD-10-CM

## 2023-11-09 ENCOUNTER — OFFICE VISIT (OUTPATIENT)
Dept: ORTHOPEDIC SURGERY | Facility: CLINIC | Age: 63
End: 2023-11-09
Payer: COMMERCIAL

## 2023-11-09 VITALS
TEMPERATURE: 97.5 F | WEIGHT: 293 LBS | HEIGHT: 66 IN | BODY MASS INDEX: 47.09 KG/M2 | DIASTOLIC BLOOD PRESSURE: 102 MMHG | SYSTOLIC BLOOD PRESSURE: 142 MMHG

## 2023-11-09 DIAGNOSIS — R52 PAIN: Primary | ICD-10-CM

## 2023-11-09 RX ORDER — VENLAFAXINE HYDROCHLORIDE 150 MG/1
150 CAPSULE, EXTENDED RELEASE ORAL DAILY
Qty: 30 CAPSULE | Refills: 0 | Status: SHIPPED | OUTPATIENT
Start: 2023-11-09

## 2023-11-09 NOTE — H&P
Patient: Iris Hyde    Date of Admission: 11/29/2023    YOB: 1960    Medical Record Number: [id number]    Admitting Physician: Dr. Charlie Lagos    Reason for Admission: End Stage Right Hip OA    History of Present Illness: 63 y.o. female presents with severe end stage hip osteoarthritis which has not been responsive to the full compliment of conservative measures. Despite conservative attempts, there is still severe, constant activity limiting hip pain. Given the severity of the pain, the patient has elected to proceed with hip replacement.    Allergies:   Allergies   Allergen Reactions    Lisinopril Cough    Butorphanol Tartrate Other (See Comments)     PSYCHOTIC    Hydrocodone-Acetaminophen Itching     NORCO AND VICODIN    Cymbalta [Duloxetine Hcl] Other (See Comments)     SUICIDAL IDEATION         Current Medications:  Home Medications:    Current Outpatient Medications on File Prior to Visit   Medication Sig    albuterol sulfate  (90 Base) MCG/ACT inhaler Inhale 2 puffs Every 4 (Four) Hours As Needed for Wheezing or Shortness of Air.    cyclobenzaprine (FLEXERIL) 10 MG tablet Take 1 tablet by mouth three times daily as needed for muscle spasm    fluticasone (Flonase) 50 MCG/ACT nasal spray 1 spray in each nostril twice daily (Patient taking differently: 1 spray into the nostril(s) as directed by provider Daily As Needed.)    guaiFENesin 200 MG tablet Take 2 tablets by mouth Every 4 (Four) Hours As Needed for Cough.    levothyroxine (SYNTHROID, LEVOTHROID) 200 MCG tablet Take 1 tablet by mouth Daily. Take with 8 onces of water 1 hour before meals/medications. (Patient taking differently: Take 1 tablet by mouth Every Night. Take with 8 onces of water 1 hour before meals/medications.)    losartan (Cozaar) 25 MG tablet Take 1 tablet by mouth Daily. (Patient taking differently: Take 1 tablet by mouth Every Night. HOLD DOSE PRIOR TO SURGERY (BRING WITH YOU))    naproxen sodium (ALEVE) 220 MG  tablet Take 1 tablet by mouth 2 (Two) Times a Day As Needed for Mild Pain. HOLD FOR 2 WEEKS PRIOR TO SURGERY    pantoprazole (Protonix) 40 MG EC tablet Take 1 tablet by mouth Daily. (Patient taking differently: Take 1 tablet by mouth Every Night.)    promethazine (PHENERGAN) 25 MG tablet Take 1 tablet by mouth Every 6 (Six) Hours As Needed for Nausea or Vomiting (Gastroparesis).    varenicline (CHANTIX) 1 MG tablet Take 1 tablet by mouth twice daily (Patient taking differently: Take 1 tablet by mouth 2 (Two) Times a Day.)    venlafaxine XR (EFFEXOR-XR) 150 MG 24 hr capsule Take 1 capsule by mouth once daily    [DISCONTINUED] venlafaxine XR (EFFEXOR-XR) 150 MG 24 hr capsule Take 1 capsule by mouth once daily (Patient taking differently: Take 1 capsule by mouth Every Night.)     No current facility-administered medications on file prior to visit.     PRN Meds:.    PMH:  Past Medical History:   Diagnosis Date    Anxiety     Arthritis     Arthritis of back     Asthma     Bilateral hip pain     Chronic low back pain     Colon polyp 4 yrs    COPD (chronic obstructive pulmonary disease) ? Last 5 yrs    Dr De Leon said beginning of emphysema    Depression     Diverticulitis of colon not sure    Diverticulosis 2018    Eczema     Esophageal reflux     Fibromyalgia     Gastroparesis     History of blood clots     SUPERFICIAL LEFT LOWER LEG    History of migraine     Hypertension not sure    Hypothyroidism 25-30yrs    Kidney stone 4-5 years ago    Lactose intolerance 5 yrs    Obesity 30 years    Osteoarthritis of right hip     Right hip pain     Seasonal allergies     Skin cancer     BASAL CELL ON FACE    Sleep apnea with use of continuous positive airway pressure (CPAP)     Urinary frequency     WEARS PAD        PSURGH:  Past Surgical History:   Procedure Laterality Date    APPENDECTOMY  1980    DR. MATIAS    CHOLECYSTECTOMY  1989    DR. IGOR FUNES    COLONOSCOPY  09/20/2013    Two 8-9mm polyps in the rectum and in the sigmoid  colon, 5mm polyp in the transverse colon, two 2-3mm polyps in the sigmoid colon, NBIH.  PATH: Tubular adenom, hyperplastic changes.     COLONOSCOPY N/A 08/21/2018    diverticulosis, NBIH, mixed TA/hyperplastic polyps    CRANIOTOMY  1996    FOR 2 CYST    CYSTOSCOPY W/ URETERAL STENT PLACEMENT Right 12/06/2018    Procedure: CYSTO  RIGHT STENT RIGHT RETROGRADE;  Surgeon: Evin Glover MD;  Location: [facility] OR;  Service: Urology    ENDOSCOPY  08/29/2013    LA Grade A reflux esophagitis, HH, erosive gastritis, A single small papule with no stigmata of recent bleeding was found.  PATH:Benign.     ENDOSCOPY N/A 08/21/2018    small hiatal hernia, gastritis, normal examined duodenum    HYSTERECTOMY  2000    SHOULDER SURGERY Left 5312-2103    Rotator cuff    TONSILLECTOMY  1982    TOTAL HIP ARTHROPLASTY Left 6/26/2023    Procedure: TOTAL HIP ARTHROPLASTY;  Surgeon: Charlie Lagos MD;  Location: [facility];  Service: Orthopedics;  Laterality: Left;    UPPER GASTROINTESTINAL ENDOSCOPY  08/21/2018       SocialHx:  Social History     Occupational History    Not on file   Tobacco Use    Smoking status: Every Day     Packs/day: 0.50     Years: 45.00     Additional pack years: 0.00     Total pack years: 22.50     Types: Cigarettes    Smokeless tobacco: Never   Vaping Use    Vaping Use: Never used   Substance and Sexual Activity    Alcohol use: Yes     Comment: Social 2-3x/yr    Drug use: Yes     Frequency: 3.0 times per week     Types: Marijuana     Comment: Daily for pain management    Sexual activity: Not Currently     Birth control/protection: Surgical, Abstinence, Post-menopausal      Social History     Social History Narrative    Not on file       FamHx:  Family History   Problem Relation Age of Onset    Diabetes Father         Type 2    Hearing loss Father         Hearing aide. Deaf in 1 ear    Hyperlipidemia Father     Asthma Sister     Diabetes Brother         Type 2    Heart disease Brother         1st MI @  "age 39, 60 now    Drug abuse Daughter         Stared using Meth approximately 2 years ago    Heart disease Other     Diabetes Other     Alcohol abuse Other     Anxiety disorder Other     Bipolar disorder Other     Cancer Other     Depression Other     Lung disease Other     Kidney disease Other     Rheum arthritis Other     Thyroid disease Other     Malig Hyperthermia Neg Hx          Review of Systems:   A 14 point review of systems was performed, pertinent positives discussed above, all other systems are negative    Physical Exam: 63 y.o. female  Vital Signs :   Vitals:    11/09/23 1644   BP: (!) 142/102   BP Location: Left arm   Patient Position: Sitting   Cuff Size: Large Adult   Temp: 97.5 °F (36.4 °C)   TempSrc: Temporal   Weight: (!) 137 kg (303 lb)   Height: 167.6 cm (65.98\")   PainSc: 10-Worst pain ever   PainLoc: Hip     General: Alert and Oriented x 3, No acute distress.  Psych: mood and affect appropriate; recent and remote memory intact  Eyes: conjunctivae clear; pupils equally round and reactive, sclerae antiicteric  CV: no peripheral edema  Resp: normal respiratory effort  Skin: no rashes or wounds; normal turgor  Musculosketetal; pain with hip range of motion. Positive Stinchfeld test. No trochanteric tenderness.  Vascular: palpable distal pulses    Labs:    Pre-Admission Testing on 11/07/2023   Component Date Value Ref Range Status    Glucose 11/07/2023 129 (H)  65 - 99 mg/dL Final    BUN 11/07/2023 17  8 - 23 mg/dL Final    Creatinine 11/07/2023 0.84  0.57 - 1.00 mg/dL Final    Sodium 11/07/2023 141  136 - 145 mmol/L Final    Potassium 11/07/2023 4.2  3.5 - 5.2 mmol/L Final    Chloride 11/07/2023 106  98 - 107 mmol/L Final    CO2 11/07/2023 27.0  22.0 - 29.0 mmol/L Final    Calcium 11/07/2023 8.9  8.6 - 10.5 mg/dL Final    BUN/Creatinine Ratio 11/07/2023 20.2  7.0 - 25.0 Final    Anion Gap 11/07/2023 8.0  5.0 - 15.0 mmol/L Final    eGFR 11/07/2023 78.7  >60.0 mL/min/1.73 Final    WBC 11/07/2023 7.12 " " 3.40 - 10.80 10*3/mm3 Final    RBC 11/07/2023 4.60  3.77 - 5.28 10*6/mm3 Final    Hemoglobin 11/07/2023 13.8  12.0 - 15.9 g/dL Final    Hematocrit 11/07/2023 41.3  34.0 - 46.6 % Final    MCV 11/07/2023 89.8  79.0 - 97.0 fL Final    MCH 11/07/2023 30.0  26.6 - 33.0 pg Final    MCHC 11/07/2023 33.4  31.5 - 35.7 g/dL Final    RDW 11/07/2023 13.5  12.3 - 15.4 % Final    RDW-SD 11/07/2023 44.0  37.0 - 54.0 fl Final    MPV 11/07/2023 9.8  6.0 - 12.0 fL Final    Platelets 11/07/2023 242  140 - 450 10*3/mm3 Final     Xrays:  Xrays AP pelvis and a lateral of the Right hip were ordered and reviewed demonstrating  End stage hip OA with bone on bone articulation, subchondral cysts and periarticular osteophytes.    Assessment:  End-stage Right hip osteoarthritis. Conservative measures have failed.      Plan:  The plan is to proceed with Right Total Hip Replacement. The patient voiced understanding of the risks, benefits, and alternative forms of treatment that were discussed with Dr Lagos at the time of scheduling.  23-hour home health, Percocet 10 mg postop for pain, in addition she has gained 28 pounds since last visit, patient is adamant that she is not going to cancel her surgery at this time\" she has to have it\" she understands if she does not lose 20 pounds before surgery which is actually over 3 weeks from now, she will call us and let us know and we would need to cancel.  She has discussed this extensively with Dr. Lagos and that is her current weight goal    Pura Escobar, APRN  11/9/2023   "

## 2023-11-10 DIAGNOSIS — I10 ESSENTIAL HYPERTENSION: ICD-10-CM

## 2023-11-10 RX ORDER — LOSARTAN POTASSIUM 25 MG/1
25 TABLET ORAL DAILY
Qty: 90 TABLET | Refills: 0 | Status: SHIPPED | OUTPATIENT
Start: 2023-11-10

## 2023-12-22 DIAGNOSIS — M79.7 FIBROMYALGIA: ICD-10-CM

## 2023-12-22 RX ORDER — CYCLOBENZAPRINE HCL 10 MG
10 TABLET ORAL 3 TIMES DAILY PRN
Qty: 90 TABLET | Refills: 0 | Status: SHIPPED | OUTPATIENT
Start: 2023-12-22

## 2023-12-23 DIAGNOSIS — E03.9 ACQUIRED HYPOTHYROIDISM: ICD-10-CM

## 2023-12-26 DIAGNOSIS — F41.8 MIXED ANXIETY DEPRESSIVE DISORDER: ICD-10-CM

## 2023-12-26 RX ORDER — LEVOTHYROXINE SODIUM 0.2 MG/1
TABLET ORAL
Qty: 30 TABLET | Refills: 0 | Status: SHIPPED | OUTPATIENT
Start: 2023-12-26

## 2023-12-26 RX ORDER — VENLAFAXINE HYDROCHLORIDE 150 MG/1
150 CAPSULE, EXTENDED RELEASE ORAL DAILY
Qty: 30 CAPSULE | Refills: 0 | Status: SHIPPED | OUTPATIENT
Start: 2023-12-26

## 2024-01-09 DIAGNOSIS — F17.210 CIGARETTE SMOKER: ICD-10-CM

## 2024-01-09 NOTE — TELEPHONE ENCOUNTER
Rx Refill Note  Requested Prescriptions     Pending Prescriptions Disp Refills    varenicline (CHANTIX) 1 MG tablet 60 tablet 0     Sig: Take 1 tablet by mouth 2 (Two) Times a Day.      Last office visit with prescribing clinician: 6/2/2023   Last telemedicine visit with prescribing clinician: Visit date not found   Next office visit with prescribing clinician: Visit date not found                         Would you like a call back once the refill request has been completed: [] Yes [] No    If the office needs to give you a call back, can they leave a voicemail: [] Yes [] No    Silvia Fernandes MA  01/09/24, 14:28 EST

## 2024-01-10 RX ORDER — VARENICLINE TARTRATE 1 MG/1
1 TABLET, FILM COATED ORAL 2 TIMES DAILY
Qty: 120 TABLET | Refills: 0 | Status: SHIPPED | OUTPATIENT
Start: 2024-01-10

## 2024-01-25 DIAGNOSIS — F41.8 MIXED ANXIETY DEPRESSIVE DISORDER: ICD-10-CM

## 2024-01-25 RX ORDER — VENLAFAXINE HYDROCHLORIDE 150 MG/1
150 CAPSULE, EXTENDED RELEASE ORAL DAILY
Qty: 90 CAPSULE | Refills: 0 | Status: SHIPPED | OUTPATIENT
Start: 2024-01-25

## 2024-02-05 ENCOUNTER — TELEPHONE (OUTPATIENT)
Dept: GASTROENTEROLOGY | Facility: CLINIC | Age: 64
End: 2024-02-05

## 2024-02-09 DIAGNOSIS — M79.7 FIBROMYALGIA: ICD-10-CM

## 2024-02-11 RX ORDER — CYCLOBENZAPRINE HCL 10 MG
10 TABLET ORAL 3 TIMES DAILY PRN
Qty: 90 TABLET | Refills: 0 | OUTPATIENT
Start: 2024-02-11

## 2024-02-26 DIAGNOSIS — M79.7 FIBROMYALGIA: ICD-10-CM

## 2024-02-26 DIAGNOSIS — E03.9 ACQUIRED HYPOTHYROIDISM: ICD-10-CM

## 2024-02-27 RX ORDER — CYCLOBENZAPRINE HCL 10 MG
10 TABLET ORAL 3 TIMES DAILY PRN
Qty: 90 TABLET | Refills: 0 | OUTPATIENT
Start: 2024-02-27

## 2024-02-27 RX ORDER — LEVOTHYROXINE SODIUM 0.2 MG/1
TABLET ORAL
Qty: 30 TABLET | Refills: 0 | OUTPATIENT
Start: 2024-02-27

## 2024-02-27 NOTE — TELEPHONE ENCOUNTER
Rx Refill Note  Requested Prescriptions     Pending Prescriptions Disp Refills    cyclobenzaprine (FLEXERIL) 10 MG tablet 90 tablet 0     Sig: Take 1 tablet by mouth 3 (Three) Times a Day As Needed. for muscle spams    levothyroxine (SYNTHROID, LEVOTHROID) 200 MCG tablet 30 tablet 0      Last office visit with prescribing clinician: 6/2/2023   Last telemedicine visit with prescribing clinician: Visit date not found   Next office visit with prescribing clinician: Visit date not found                         Would you like a call back once the refill request has been completed: [] Yes [] No    If the office needs to give you a call back, can they leave a voicemail: [] Yes [] No    Silvia Fernandes MA  02/27/24, 09:02 EST

## 2024-06-12 NOTE — PROGRESS NOTES
Patient: Iris Hyde  YOB: 1960 60 y.o. female  Medical Record Number: [id number]    Chief Complaints:   Chief Complaint   Patient presents with   • Left Hip - OPNC, Pain   • Right Hip - OPNC, Pain       History of Present Illness:Iris Hyde is a 60 y.o. female who presents with complaints of bilateral hip pain left greater than right.  She is also having pain in her lower back.  She describes the hip pain as a severe constant grinding aching type pain worse with standing sitting walking, better with ice.    Allergies:   Allergies   Allergen Reactions   • Butorphanol Mental Status Change   • Butorphanol Tartrate Other (See Comments)     PSYCHOTIC   • Hydrocodone-Acetaminophen Itching     norco    • Cymbalta [Duloxetine Hcl] Anxiety   • Hydrocodone-Acetaminophen Rash       Medications:   Current Outpatient Medications   Medication Sig Dispense Refill   • ALBUTEROL SULFATE  (90 Base) MCG/ACT inhaler INHALE 2 PUFFS BY MOUTH EVERY 4 HOURS AS NEEDED FOR WHEEZING 18 g 0   • cyclobenzaprine (FLEXERIL) 10 MG tablet Take 1 tablet by mouth 3 (Three) Times a Day. 90 tablet 11   • guaiFENesin-codeine (GUAIFENESIN AC) 100-10 MG/5ML liquid 5-10 ml qid prn 180 mL 0   • levothyroxine (SYNTHROID, LEVOTHROID) 200 MCG tablet TAKE 1 TABLET BY MOUTH DAILY 90 tablet 3   • melatonin 5 MG tablet tablet Take 10 mg by mouth.     • pantoprazole (PROTONIX) 40 MG EC tablet Take 1 tablet by mouth Daily. 30 tablet 11   • promethazine (PHENERGAN) 25 MG tablet Take 1 tablet by mouth Every 6 (Six) Hours As Needed for Nausea or Vomiting. 100 tablet 5   • venlafaxine XR (EFFEXOR-XR) 75 MG 24 hr capsule Take 1 capsule by mouth Daily. 30 capsule 11     No current facility-administered medications for this visit.          The following portions of the patient's history were reviewed and updated as appropriate: allergies, current medications, past family history, past medical history, past social history, past surgical history  Report given to Merissa JORDAN on 9S   "and problem list.    Review of Systems:   A 14 point review of systems was performed. All systems negative except pertinent positives/negative listed in HPI above    Physical Exam:   Vitals:    06/09/20 1349   Temp: 97.7 °F (36.5 °C)   Weight: 119 kg (261 lb 6.4 oz)   Height: 170.2 cm (67\")   PainSc:   8       General: A and O x 3, ASA, NAD    SCLERA:    Normal    DENTITION:   Normal  Skin clear no unusual lesions noted  Bilateral hips patient is tender over bilateral hip greater trochanteric bursa she has pain with internal X rotation with a positive [employer] positive logroll calf soft and nontender    Radiology:  Xrays 2 views of bilateral hips were ordered and reviewed today secondary to pain and show significant arthritic changes noted bilaterally.  Compared to views show definite progression in arthritic changes    Assessment/Plan:  Osteoarthritis bilateral hips    Patient discussed treatment options.  Since she is also having some pain in her lower back he would be a good idea to send her for a left hip fluoroscopy guided cortisone injection to confirm exact location of the severe pain she is having.  Patient is agreeable.  She will follow-up with us in approximately 2 to 3 weeks to see how she responds to the injection and potentially discuss total hip replacement  Answers for HPI/ROS submitted by the patient on 6/9/2020   What is the primary reason for your visit?: Other  Please describe your symptoms.: Bilateral hip and groin pain  Have you had these symptoms before?: Yes  How long have you been having these symptoms?: 1-4 days  Please list any medications you are currently taking for this condition.: Levothyroxine, Flexeril, Effexor, Protonix, Aleve, Phenergan  Please describe any probable cause for these symptoms. : I have osteoarthritis and fibromyalgia.    "

## 2024-07-12 ENCOUNTER — PATIENT ROUNDING (BHMG ONLY) (OUTPATIENT)
Dept: FAMILY MEDICINE CLINIC | Facility: CLINIC | Age: 64
End: 2024-07-12
Payer: COMMERCIAL

## 2024-07-12 ENCOUNTER — OFFICE VISIT (OUTPATIENT)
Dept: FAMILY MEDICINE CLINIC | Facility: CLINIC | Age: 64
End: 2024-07-12
Payer: COMMERCIAL

## 2024-07-12 VITALS
OXYGEN SATURATION: 96 % | BODY MASS INDEX: 47.09 KG/M2 | RESPIRATION RATE: 20 BRPM | HEIGHT: 66 IN | HEART RATE: 72 BPM | DIASTOLIC BLOOD PRESSURE: 98 MMHG | WEIGHT: 293 LBS | SYSTOLIC BLOOD PRESSURE: 134 MMHG

## 2024-07-12 DIAGNOSIS — R73.03 PREDIABETES: ICD-10-CM

## 2024-07-12 DIAGNOSIS — F51.01 PRIMARY INSOMNIA: ICD-10-CM

## 2024-07-12 DIAGNOSIS — E78.2 MIXED HYPERLIPIDEMIA: ICD-10-CM

## 2024-07-12 DIAGNOSIS — G47.33 OSA ON CPAP: ICD-10-CM

## 2024-07-12 DIAGNOSIS — Z71.6 ENCOUNTER FOR SMOKING CESSATION COUNSELING: Primary | ICD-10-CM

## 2024-07-12 DIAGNOSIS — E03.9 ACQUIRED HYPOTHYROIDISM: ICD-10-CM

## 2024-07-12 DIAGNOSIS — I10 ESSENTIAL HYPERTENSION: ICD-10-CM

## 2024-07-12 PROCEDURE — 99214 OFFICE O/P EST MOD 30 MIN: CPT | Performed by: STUDENT IN AN ORGANIZED HEALTH CARE EDUCATION/TRAINING PROGRAM

## 2024-07-12 RX ORDER — TRAZODONE HYDROCHLORIDE 50 MG/1
50 TABLET ORAL NIGHTLY
Qty: 30 TABLET | Refills: 2 | Status: SHIPPED | OUTPATIENT
Start: 2024-07-12

## 2024-07-12 RX ORDER — VARENICLINE TARTRATE 0.5 (11)-1
KIT ORAL
Qty: 1 EACH | Refills: 0 | Status: SHIPPED | OUTPATIENT
Start: 2024-07-12 | End: 2024-08-09

## 2024-07-12 RX ORDER — VARENICLINE TARTRATE 1 MG/1
1 TABLET, FILM COATED ORAL 2 TIMES DAILY
Qty: 56 TABLET | Refills: 1 | Status: SHIPPED | OUTPATIENT
Start: 2024-08-09 | End: 2024-10-04

## 2024-07-12 NOTE — PROGRESS NOTES
A [employer] message has been sent to the patient for PATIENT ROUNDING with [facility]

## 2024-07-12 NOTE — PROGRESS NOTES
Venipuncture Blood Specimen Collection  Venipuncture performed in left hand by Sandy Lim MA with good hemostasis. Patient tolerated the procedure well without complications.   07/12/24   Sandy Lim MA

## 2024-07-13 LAB
ALBUMIN SERPL-MCNC: 4.2 G/DL (ref 3.5–5.2)
ALBUMIN/GLOB SERPL: 1.6 G/DL
ALP SERPL-CCNC: 140 U/L (ref 39–117)
ALT SERPL-CCNC: 14 U/L (ref 1–33)
AST SERPL-CCNC: 19 U/L (ref 1–32)
BILIRUB SERPL-MCNC: <0.2 MG/DL (ref 0–1.2)
BUN SERPL-MCNC: 16 MG/DL (ref 8–23)
BUN/CREAT SERPL: 14.8 (ref 7–25)
CALCIUM SERPL-MCNC: 9.2 MG/DL (ref 8.6–10.5)
CHLORIDE SERPL-SCNC: 105 MMOL/L (ref 98–107)
CHOLEST SERPL-MCNC: 219 MG/DL (ref 0–200)
CO2 SERPL-SCNC: 25.9 MMOL/L (ref 22–29)
CREAT SERPL-MCNC: 1.08 MG/DL (ref 0.57–1)
EGFRCR SERPLBLD CKD-EPI 2021: 57.8 ML/MIN/1.73
GLOBULIN SER CALC-MCNC: 2.7 GM/DL
GLUCOSE SERPL-MCNC: 93 MG/DL (ref 65–99)
HBA1C MFR BLD: 5.8 % (ref 4.8–5.6)
HDLC SERPL-MCNC: 60 MG/DL (ref 40–60)
LDLC SERPL CALC-MCNC: 135 MG/DL (ref 0–100)
POTASSIUM SERPL-SCNC: 4.3 MMOL/L (ref 3.5–5.2)
PROT SERPL-MCNC: 6.9 G/DL (ref 6–8.5)
SODIUM SERPL-SCNC: 142 MMOL/L (ref 136–145)
T3FREE SERPL-MCNC: 1.3 PG/ML (ref 2–4.4)
T4 FREE SERPL-MCNC: 0.23 NG/DL (ref 0.92–1.68)
TRIGL SERPL-MCNC: 138 MG/DL (ref 0–150)
TSH SERPL DL<=0.005 MIU/L-ACNC: 70.2 UIU/ML (ref 0.27–4.2)
VLDLC SERPL CALC-MCNC: 24 MG/DL (ref 5–40)

## 2024-07-18 ENCOUNTER — TELEPHONE (OUTPATIENT)
Dept: FAMILY MEDICINE CLINIC | Facility: CLINIC | Age: 64
End: 2024-07-18
Payer: COMMERCIAL

## 2024-07-18 DIAGNOSIS — E03.9 ACQUIRED HYPOTHYROIDISM: ICD-10-CM

## 2024-07-18 DIAGNOSIS — F41.8 MIXED ANXIETY DEPRESSIVE DISORDER: ICD-10-CM

## 2024-07-18 DIAGNOSIS — M79.7 FIBROMYALGIA: ICD-10-CM

## 2024-07-18 DIAGNOSIS — I10 ESSENTIAL HYPERTENSION: ICD-10-CM

## 2024-07-18 PROBLEM — R73.03 PREDIABETES: Status: ACTIVE | Noted: 2024-07-18

## 2024-07-18 PROBLEM — G47.33 OSA ON CPAP: Status: ACTIVE | Noted: 2017-06-14

## 2024-07-18 PROBLEM — F51.01 PRIMARY INSOMNIA: Status: ACTIVE | Noted: 2024-07-18

## 2024-07-18 RX ORDER — PROMETHAZINE HYDROCHLORIDE 25 MG/1
25 TABLET ORAL EVERY 6 HOURS PRN
Qty: 90 TABLET | Refills: 0 | Status: SHIPPED | OUTPATIENT
Start: 2024-07-18

## 2024-07-18 RX ORDER — VENLAFAXINE 75 MG/1
75 TABLET ORAL DAILY
Qty: 90 TABLET | Refills: 0 | Status: SHIPPED | OUTPATIENT
Start: 2024-07-18

## 2024-07-18 RX ORDER — LOSARTAN POTASSIUM 25 MG/1
25 TABLET ORAL 2 TIMES DAILY
Qty: 180 TABLET | Refills: 0 | Status: SHIPPED | OUTPATIENT
Start: 2024-07-18

## 2024-07-18 RX ORDER — LEVOTHYROXINE SODIUM 0.2 MG/1
200 TABLET ORAL DAILY
Qty: 180 TABLET | Refills: 0 | Status: SHIPPED | OUTPATIENT
Start: 2024-07-18

## 2024-07-18 RX ORDER — CYCLOBENZAPRINE HCL 10 MG
10 TABLET ORAL 3 TIMES DAILY PRN
Qty: 90 TABLET | Refills: 3 | Status: SHIPPED | OUTPATIENT
Start: 2024-07-18

## 2024-07-18 NOTE — TELEPHONE ENCOUNTER
----- Message from [facility] sent at 7/18/2024  2:27 PM EDT -----  Regarding: Welcome to our practice  Contact: [phone]  Reza Lama,  I don’t see Dr. Livingston listed as someone I can message so that’s why I’m messaging you. I saw Dr. Livingston on 7/12. I also went to [facility] a couple of weeks before my appointment because I was out of meds and needed them ordered. Dr. Livingston wrote my other prescription but didn’t reorder the one’s Urgent Care ordered for me and now I’m out. I told him I went to urgent care to get my meds reordered. [facility] [address] [phone]. I took my last pills today. Please reorder the following meds at your earliest convenience.   Venlafaxine 75mg (I was on 150mg, APRN wouldn’t reorder my meds unless I came in to have labs drawn. [place] had to withdrawal off the med and urgent care wanted to start me back slow)   Losartan 25mg BID  Cyclobenziprine 10mg TID  I was also on Levothyroxine 200mcg. Dr. Livingston ordered my labs but he didn’t call in the Levothyroxine. He may be waiting until my next appointment to go over the lab results.   Promethazine 25mg PO PRN N/V (gastroparesis). Thank you!  Iris
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
## 2024-08-02 ENCOUNTER — OFFICE VISIT (OUTPATIENT)
Dept: ORTHOPEDIC SURGERY | Facility: CLINIC | Age: 64
End: 2024-08-02
Payer: COMMERCIAL

## 2024-08-02 VITALS — WEIGHT: 293 LBS | BODY MASS INDEX: 47.09 KG/M2 | TEMPERATURE: 98.2 F | HEIGHT: 66 IN

## 2024-08-02 DIAGNOSIS — M25.551 RIGHT HIP PAIN: ICD-10-CM

## 2024-08-02 DIAGNOSIS — M16.11 PRIMARY OSTEOARTHRITIS OF RIGHT HIP: ICD-10-CM

## 2024-08-02 DIAGNOSIS — R52 PAIN: Primary | ICD-10-CM

## 2024-08-02 RX ORDER — VIBEGRON 75 MG/1
TABLET, FILM COATED ORAL
COMMUNITY
Start: 2024-07-26

## 2024-08-02 NOTE — PROGRESS NOTES
Patient: Iris Hyde  YOB: 1960 64 y.o. female  Medical Record Number: [id number]    Chief Complaints:   Chief Complaint   Patient presents with    Right Hip - Pain, Follow-up       History of Present Illness:Iris Hyde is a 64 y.o. female who presents with complaints of worsening in right hip pain, unfortunate she has bone-on-bone end-stage osteoarthritis, surgery at this point is not an option given her current BMI.  She reports that the pain is severe she is having to use a cane    Allergies:   Allergies   Allergen Reactions    Lisinopril Cough    Butorphanol Tartrate Other (See Comments)     PSYCHOTIC    Hydrocodone-Acetaminophen Itching     NORCO AND VICODIN    Cymbalta [Duloxetine Hcl] Other (See Comments)     SUICIDAL IDEATION       Medications:   Current Outpatient Medications   Medication Sig Dispense Refill    albuterol sulfate  (90 Base) MCG/ACT inhaler Inhale 2 puffs Every 4 (Four) Hours As Needed for Wheezing or Shortness of Air. 18 g 1    cyclobenzaprine (FLEXERIL) 10 MG tablet Take 1 tablet by mouth 3 (Three) Times a Day As Needed for Muscle Spasms. for muscle spams 90 tablet 3    fluticasone (Flonase) 50 MCG/ACT nasal spray 1 spray in each nostril twice daily (Patient taking differently: 1 spray into the nostril(s) as directed by provider Daily As Needed.) 16 g 1    levothyroxine (SYNTHROID, LEVOTHROID) 200 MCG tablet Take 1 tablet by mouth Daily. 180 tablet 0    losartan (COZAAR) 25 MG tablet Take 1 tablet by mouth 2 (Two) Times a Day. 180 tablet 0    naproxen sodium (ALEVE) 220 MG tablet Take 1 tablet by mouth 2 (Two) Times a Day As Needed for Mild Pain. HOLD FOR 2 WEEKS PRIOR TO SURGERY      promethazine (PHENERGAN) 25 MG tablet Take 1 tablet by mouth Every 6 (Six) Hours As Needed for Nausea or Vomiting (Gastroparesis). 90 tablet 0    traZODone (DESYREL) 50 MG tablet Take 1 tablet by mouth Every Night. 30 tablet 2    [START ON 8/9/2024] varenicline (Chantix Continuing  "Month Pak) 1 MG tablet Take 1 tablet by mouth 2 (Two) Times a Day for 56 days. 56 tablet 1    Varenicline Tartrate, Starter, 0.5 MG X 11 & 1 MG X 42 tablet therapy pack Take 0.5 mg by mouth Daily for 3 days, THEN 0.5 mg 2 (Two) Times a Day for 4 days, THEN 1 mg 2 (Two) Times a Day for 21 days. Take 0.5 mg po daily x 3 days, then 0.5 mg po bid x 4 days, then 1 mg po bid 1 each 0    venlafaxine (EFFEXOR) 75 MG tablet Take 1 tablet by mouth Daily. 90 tablet 0    Vibegron (Gemtesa) 75 MG tablet        No current facility-administered medications for this visit.         The following portions of the patient's history were reviewed and updated as appropriate: allergies, current medications, past family history, past medical history, past social history, past surgical history and problem list.    Review of Systems:   14 point review of systems was performed. All systems negative except pertinent positives/negatives listed in HPI above    Physical Exam:   Vitals:    08/02/24 1530   Temp: 98.2 °F (36.8 °C)   Weight: (!) 141 kg (309 lb 14.4 oz)   Height: 167.6 cm (66\")   PainSc:   8   PainLoc: Hip       General: A and O x 3, ASA, NAD    Body mass index is 50.02 kg/m².  Right hip the patient is nontender palpation she has severe pain with internal ex rotation with a positive Stinchfield positive logroll calf is soft and nontender      Radiology:  Xrays 2 views of right hip ordered and reviewed today secondary to severe pain show bone-on-bone end-stage osteoarthritis with cyst and spur formation.  Comparative views are unchanged    Assessment/Plan: End-stage osteoarthritis right hip with severe pain    Patient and I discussed options including total hip replacement, risk benefits alternatives discussed.  Patient has set a 60 pound weight loss goal in order to have her BMI at acceptable range in order to proceed with surgery.  We will send her for a right hip fluoroscopy guided cortisone injection hopefully that will provide " some relief for a period of time until we can move forward with surgery.      Pura Escobar, APRN  8/2/2024

## 2024-08-14 ENCOUNTER — HOSPITAL ENCOUNTER (OUTPATIENT)
Dept: GENERAL RADIOLOGY | Facility: HOSPITAL | Age: 64
Discharge: HOME | End: 2024-08-14
Payer: COMMERCIAL

## 2024-08-14 DIAGNOSIS — M25.551 RIGHT HIP PAIN: ICD-10-CM

## 2024-08-14 DIAGNOSIS — M16.11 PRIMARY OSTEOARTHRITIS OF RIGHT HIP: ICD-10-CM

## 2024-08-14 PROCEDURE — 25510000001 IOPAMIDOL 61 % SOLUTION: Performed by: NURSE PRACTITIONER

## 2024-08-14 PROCEDURE — 25010000002 BUPIVACAINE 0.25 % SOLUTION: Performed by: NURSE PRACTITIONER

## 2024-08-14 PROCEDURE — 25010000002 METHYLPREDNISOLONE PER 80 MG: Performed by: NURSE PRACTITIONER

## 2024-08-14 PROCEDURE — 25010000002 LIDOCAINE 1 % SOLUTION: Performed by: NURSE PRACTITIONER

## 2024-08-14 PROCEDURE — 77002 NEEDLE LOCALIZATION BY XRAY: CPT

## 2024-08-14 RX ORDER — METHYLPREDNISOLONE ACETATE 80 MG/ML
80 INJECTION, SUSPENSION INTRA-ARTICULAR; INTRALESIONAL; INTRAMUSCULAR; SOFT TISSUE ONCE
Status: COMPLETED | OUTPATIENT
Start: 2024-08-14 | End: 2024-08-14

## 2024-08-14 RX ORDER — BUPIVACAINE HYDROCHLORIDE 2.5 MG/ML
5 INJECTION, SOLUTION INFILTRATION; PERINEURAL ONCE
Status: COMPLETED | OUTPATIENT
Start: 2024-08-14 | End: 2024-08-14

## 2024-08-14 RX ORDER — LIDOCAINE HYDROCHLORIDE 10 MG/ML
5 INJECTION, SOLUTION INFILTRATION; PERINEURAL ONCE
Status: COMPLETED | OUTPATIENT
Start: 2024-08-14 | End: 2024-08-14

## 2024-08-14 RX ADMIN — IOPAMIDOL 4 ML: 612 INJECTION, SOLUTION INTRAVENOUS at 14:48

## 2024-08-14 RX ADMIN — METHYLPREDNISOLONE ACETATE 80 MG: 80 INJECTION, SUSPENSION INTRA-ARTICULAR; INTRALESIONAL; INTRAMUSCULAR; SOFT TISSUE at 14:48

## 2024-08-14 RX ADMIN — LIDOCAINE HYDROCHLORIDE 5 ML: 10 INJECTION, SOLUTION INFILTRATION; PERINEURAL at 14:48

## 2024-08-14 RX ADMIN — BUPIVACAINE HYDROCHLORIDE 5 ML: 2.5 INJECTION, SOLUTION INFILTRATION; PERINEURAL at 14:48

## 2024-08-15 ENCOUNTER — OFFICE VISIT (OUTPATIENT)
Dept: FAMILY MEDICINE CLINIC | Facility: CLINIC | Age: 64
End: 2024-08-15
Payer: COMMERCIAL

## 2024-08-15 VITALS
HEIGHT: 66 IN | RESPIRATION RATE: 20 BRPM | WEIGHT: 293 LBS | BODY MASS INDEX: 47.09 KG/M2 | HEART RATE: 106 BPM | OXYGEN SATURATION: 96 %

## 2024-08-15 DIAGNOSIS — E66.01 CLASS 3 SEVERE OBESITY DUE TO EXCESS CALORIES WITHOUT SERIOUS COMORBIDITY WITH BODY MASS INDEX (BMI) OF 50.0 TO 59.9 IN ADULT: ICD-10-CM

## 2024-08-15 DIAGNOSIS — M19.90 ARTHRITIS: ICD-10-CM

## 2024-08-15 DIAGNOSIS — E03.9 ACQUIRED HYPOTHYROIDISM: Primary | ICD-10-CM

## 2024-08-15 DIAGNOSIS — F17.210 CIGARETTE SMOKER: ICD-10-CM

## 2024-08-15 PROCEDURE — 99214 OFFICE O/P EST MOD 30 MIN: CPT | Performed by: STUDENT IN AN ORGANIZED HEALTH CARE EDUCATION/TRAINING PROGRAM

## 2024-08-15 RX ORDER — PHENTERMINE AND TOPIRAMATE 7.5; 46 MG/1; MG/1
1 CAPSULE, EXTENDED RELEASE ORAL
Qty: 30 CAPSULE | Refills: 0 | Status: SHIPPED | OUTPATIENT
Start: 2024-08-15 | End: 2024-08-20 | Stop reason: SDUPTHER

## 2024-08-15 RX ORDER — CELECOXIB 100 MG/1
100 CAPSULE ORAL 2 TIMES DAILY
Qty: 60 CAPSULE | Refills: 2 | Status: SHIPPED | OUTPATIENT
Start: 2024-08-15 | End: 2024-08-20 | Stop reason: SDUPTHER

## 2024-08-17 ENCOUNTER — PATIENT MESSAGE (OUTPATIENT)
Dept: FAMILY MEDICINE CLINIC | Facility: CLINIC | Age: 64
End: 2024-08-17
Payer: COMMERCIAL

## 2024-08-19 ENCOUNTER — TELEPHONE (OUTPATIENT)
Dept: FAMILY MEDICINE CLINIC | Facility: CLINIC | Age: 64
End: 2024-08-19
Payer: COMMERCIAL

## 2024-08-19 NOTE — TELEPHONE ENCOUNTER
----- Message from [facility] [employer] sent at 8/17/2024  2:16 PM EDT -----  Regarding: Prescriptions  Contact: [phone]  Jose Livingston,  I told the girl that did my vital signs that my insurance wants me to use mail order for my routine meds but I still need to get my new prescriptions and short term prescriptions from [facility].  It looked like it might have got changed to all meds come from mail order. You were going to order that new Q medication for weight loss and Celebrex for pain but they weren’t called into [facility]. I need those meds, did you send it to mail order? If you didn’t, would you order it for me from [facility]?   [address]  [phone]    Thanks!!  Iris

## 2024-08-20 DIAGNOSIS — M19.90 ARTHRITIS: ICD-10-CM

## 2024-08-20 DIAGNOSIS — E66.01 CLASS 3 SEVERE OBESITY DUE TO EXCESS CALORIES WITHOUT SERIOUS COMORBIDITY WITH BODY MASS INDEX (BMI) OF 50.0 TO 59.9 IN ADULT: ICD-10-CM

## 2024-08-20 NOTE — TELEPHONE ENCOUNTER
----- Message from [facility] [employer] sent at 8/20/2024  4:02 PM EDT -----  Regarding: Prescriptions  Contact: [phone]  No, I don’t need any refills yet. I told the girl that did my V/S that my insurance wants me to do 90day mail order for my routine meds (Venlafaxine, Levothyroxine, Cyclobenzaprine and Losartan) New meds and short term meds are still supposed to go to [facility] on [address]. The mail order thing is new. They do not know me at all so I honestly don’t know if they will fill the prescriptions.

## 2024-08-21 RX ORDER — CELECOXIB 100 MG/1
100 CAPSULE ORAL 2 TIMES DAILY
Qty: 60 CAPSULE | Refills: 2 | Status: SHIPPED | OUTPATIENT
Start: 2024-08-21

## 2024-08-21 RX ORDER — PHENTERMINE AND TOPIRAMATE 7.5; 46 MG/1; MG/1
1 CAPSULE, EXTENDED RELEASE ORAL
Qty: 30 CAPSULE | Refills: 0 | Status: SHIPPED | OUTPATIENT
Start: 2024-08-21

## 2024-08-26 NOTE — PROGRESS NOTES
Mendoza Livingston DO  [facility]  [address]  [place] [id number] [phone]    Subjective      Name Iris Hyde MRN [id number]    1960 AGE/SEX 64 y.o. / female      Chief Complaint Chief Complaint   Patient presents with    Obesity     1 month follow up     Pain     Having a lot of trouble with pain. Was given injection for pain x 1 day ago and it doesn't seem to help          Visit History for  08/15/2024    Iris Hyde is a 64 y.o. female who presented today for Obesity (1 month follow up ) and Pain (Having a lot of trouble with pain. Was given injection for pain x 1 day ago and it doesn't seem to help )     History of Present Illness  The patient presents for follow-up on weight loss medication, smoking cessation, and ADHD.    She is currently taking Chantix to aid in her smoking cessation efforts. However, she has reduced the dosage due to severe vomiting, which can wake her from sleep. She also reports a history of gastroparesis but no longer experiences vomiting. Her last recorded weight was 220 pounds in , achieved after a period of daily vomiting for six months. She is unsure if her insurance covers weight loss medication.    She has been off work for seven days and is seeking short-term disability. She is currently unable to work due to hip issues and hopes to return to work once her hips are treated. She has tried meloxicam for arthritis, which was effective, but it did not alleviate her hip pain. She has also tried Tylenol without success. She has an upcoming appointment with a gastroenterologist.    She suspects she may have ADHD due to severe procrastination and difficulty focusing.    She has previously lost 45 pounds while on medication for migraines.       Medications and Allergies   Current Outpatient Medications   Medication Instructions    albuterol sulfate  (90 Base) MCG/ACT inhaler 2 puffs, Inhalation, Every 4 Hours PRN     "celecoxib (CELEBREX) 100 mg, Oral, 2 Times Daily    cyclobenzaprine (FLEXERIL) 10 mg, Oral, 3 Times Daily PRN, for muscle spams    fluticasone (Flonase) 50 MCG/ACT nasal spray 1 spray in each nostril twice daily    levothyroxine (SYNTHROID, LEVOTHROID) 200 mcg, Oral, Daily    losartan (COZAAR) 25 mg, Oral, 2 Times Daily    Phentermine-Topiramate (Qsymia) 7.5-46 MG capsule sustained-release 24 hr 1 capsule, Oral, Every Morning Before Breakfast    promethazine (PHENERGAN) 25 mg, Oral, Every 6 Hours PRN    traZODone (DESYREL) 50 mg, Oral, Nightly    varenicline (CHANTIX CONTINUING MONTH FLO) 1 mg, Oral, 2 Times Daily    venlafaxine (EFFEXOR) 75 mg, Oral, Daily    Vibegron (Gemtesa) 75 MG tablet      Allergies   Allergen Reactions    Lisinopril Cough    Butorphanol Tartrate Other (See Comments)     PSYCHOTIC    Hydrocodone-Acetaminophen Itching     NORCO AND VICODIN    Cymbalta [Duloxetine Hcl] Other (See Comments)     SUICIDAL IDEATION      I have reviewed the above medications and allergies     Objective:      Vitals Vitals:    08/15/24 1342   Pulse: 106   Resp: 20   SpO2: 96%   Weight: (!) 141 kg (311 lb)   Height: 167.6 cm (66\")     Body mass index is 50.2 kg/m².    Physical Exam     Physical Exam       Results       Assessment/Plan   Issues Addressed/ Plan   Diagnosis Plan   1. Acquired hypothyroidism  TSH    T4, free    T3, free      2. Class 3 severe obesity due to excess calories without serious comorbidity with body mass index (BMI) of 50.0 to 59.9 in adult        3. Arthritis        4. Cigarette smoker           Assessment & Plan  1. Weight Management.  The patient has struggled with weight loss despite previous efforts. Qsymia 7.5 mg has been prescribed to aid in weight management. If insurance does not cover Qsymia, Topamax and phentermine will be considered as alternatives. She is advised to start at a lower dose and titrate up as needed.    2. Smoking Cessation.  She is currently using Chantix but " experiences nausea and vomiting. She is advised to continue with Chantix and monitor for any adverse effects. The importance of quitting smoking, especially in a non-smoking property, was emphasized.    3. ADHD.  The patient suspects she might have ADHD due to increased procrastination and difficulty focusing. A trial of phentermine, which may help with both weight loss and ADHD symptoms, will be considered. If she reports improved focus, further ADHD-specific treatment will be discussed.    4. Hip Pain.  She reports significant hip pain and has been using Aleve and ibuprofen. Celebrex has been prescribed to be taken twice daily. She is also advised to regularly dose Tylenol for additional pain relief.    5. Hypothyroidism.  She is currently taking Synthroid but has not noticed any improvement in symptoms. Her thyroid levels will be rechecked to ensure the dosage is sufficient. If issues persist, switching to Synthroid from levothyroxine will be considered.             There are no Patient Instructions on file for this visit.   Follow up  recommended Return in about 3 months (around 11/15/2024).   - Dragon voice recognition software was utilized to complete this chart.  Every reasonable attempt was made to edit and correct the text, however some incorrect words may remain.   Mendoza Livingston DO    Patient or patient representative verbalized consent for the use of Ambient Listening during the visit with  Mendoza Livingston DO for chart documentation. 8/25/2024  22:11 EDT

## 2024-09-17 ENCOUNTER — OFFICE VISIT (OUTPATIENT)
Dept: FAMILY MEDICINE CLINIC | Facility: CLINIC | Age: 64
End: 2024-09-17
Payer: COMMERCIAL

## 2024-09-17 VITALS
DIASTOLIC BLOOD PRESSURE: 92 MMHG | WEIGHT: 293 LBS | HEART RATE: 65 BPM | HEIGHT: 66 IN | OXYGEN SATURATION: 98 % | BODY MASS INDEX: 47.09 KG/M2 | SYSTOLIC BLOOD PRESSURE: 148 MMHG | RESPIRATION RATE: 20 BRPM

## 2024-09-17 DIAGNOSIS — E66.01 CLASS 3 SEVERE OBESITY DUE TO EXCESS CALORIES WITHOUT SERIOUS COMORBIDITY WITH BODY MASS INDEX (BMI) OF 50.0 TO 59.9 IN ADULT: ICD-10-CM

## 2024-09-17 DIAGNOSIS — F33.1 MODERATE EPISODE OF RECURRENT MAJOR DEPRESSIVE DISORDER: Primary | ICD-10-CM

## 2024-09-17 DIAGNOSIS — F41.8 MIXED ANXIETY DEPRESSIVE DISORDER: ICD-10-CM

## 2024-09-17 DIAGNOSIS — E03.9 ACQUIRED HYPOTHYROIDISM: ICD-10-CM

## 2024-09-17 PROCEDURE — 99214 OFFICE O/P EST MOD 30 MIN: CPT | Performed by: STUDENT IN AN ORGANIZED HEALTH CARE EDUCATION/TRAINING PROGRAM

## 2024-09-17 RX ORDER — VENLAFAXINE HYDROCHLORIDE 75 MG/1
75 CAPSULE, EXTENDED RELEASE ORAL DAILY
Qty: 30 CAPSULE | Refills: 2 | Status: SHIPPED | OUTPATIENT
Start: 2024-09-17

## 2024-09-17 RX ORDER — PHENTERMINE AND TOPIRAMATE 7.5; 46 MG/1; MG/1
1 CAPSULE, EXTENDED RELEASE ORAL
Qty: 30 CAPSULE | Refills: 0 | Status: SHIPPED | OUTPATIENT
Start: 2024-09-17 | End: 2024-09-17

## 2024-09-17 RX ORDER — PHENTERMINE HYDROCHLORIDE 37.5 MG/1
37.5 TABLET ORAL
Qty: 30 TABLET | Refills: 0 | Status: SHIPPED | OUTPATIENT
Start: 2024-09-17

## 2024-09-20 ENCOUNTER — PATIENT MESSAGE (OUTPATIENT)
Dept: FAMILY MEDICINE CLINIC | Facility: CLINIC | Age: 64
End: 2024-09-20
Payer: COMMERCIAL

## 2024-09-27 RX ORDER — PROMETHAZINE HYDROCHLORIDE 25 MG/1
25 TABLET ORAL EVERY 6 HOURS PRN
Qty: 90 TABLET | Refills: 0 | Status: SHIPPED | OUTPATIENT
Start: 2024-09-27

## 2024-10-09 DIAGNOSIS — F51.01 PRIMARY INSOMNIA: ICD-10-CM

## 2024-10-09 RX ORDER — TRAZODONE HYDROCHLORIDE 50 MG/1
50 TABLET, FILM COATED ORAL NIGHTLY
Qty: 30 TABLET | Refills: 2 | Status: SHIPPED | OUTPATIENT
Start: 2024-10-09

## 2024-10-18 DIAGNOSIS — E66.01 CLASS 3 SEVERE OBESITY DUE TO EXCESS CALORIES WITHOUT SERIOUS COMORBIDITY WITH BODY MASS INDEX (BMI) OF 50.0 TO 59.9 IN ADULT: ICD-10-CM

## 2024-10-18 DIAGNOSIS — E66.813 CLASS 3 SEVERE OBESITY DUE TO EXCESS CALORIES WITHOUT SERIOUS COMORBIDITY WITH BODY MASS INDEX (BMI) OF 50.0 TO 59.9 IN ADULT: ICD-10-CM

## 2024-10-18 RX ORDER — VARENICLINE TARTRATE 1 MG/1
1 TABLET, FILM COATED ORAL 2 TIMES DAILY
Qty: 60 TABLET | Refills: 1 | Status: SHIPPED | OUTPATIENT
Start: 2024-10-18

## 2024-10-18 RX ORDER — PHENTERMINE AND TOPIRAMATE 7.5; 46 MG/1; MG/1
1 CAPSULE, EXTENDED RELEASE ORAL
Qty: 30 CAPSULE | Refills: 0 | Status: SHIPPED | OUTPATIENT
Start: 2024-10-18

## 2024-10-18 NOTE — TELEPHONE ENCOUNTER
Rx Refill Note  Requested Prescriptions     Pending Prescriptions Disp Refills    Phentermine-Topiramate (Qsymia) 7.5-46 MG capsule sustained-release 24 hr 30 capsule 0     Sig: Take 1 capsule by mouth Every Morning Before Breakfast.      Last office visit with prescribing clinician: 9/17/2024   Last telemedicine visit with prescribing clinician: Visit date not found   Next office visit with prescribing clinician: 11/18/2024

## 2024-11-03 DIAGNOSIS — M19.90 ARTHRITIS: ICD-10-CM

## 2024-11-04 RX ORDER — CELECOXIB 100 MG/1
100 CAPSULE ORAL 2 TIMES DAILY
Qty: 90 CAPSULE | Refills: 0 | Status: SHIPPED | OUTPATIENT
Start: 2024-11-04

## 2024-11-04 NOTE — TELEPHONE ENCOUNTER
Rx Refill Note  Requested Prescriptions     Pending Prescriptions Disp Refills    celecoxib (CeleBREX) 100 MG capsule [Pharmacy Med Name: CELECOXIB  100MG  CAP] 180 capsule 3     Sig: TAKE 1 CAPSULE BY MOUTH TWICE  DAILY      Last office visit with prescribing clinician: 9/17/2024   Last telemedicine visit with prescribing clinician: Visit date not found   Next office visit with prescribing clinician: 11/18/2024   {

## 2024-11-18 ENCOUNTER — OFFICE VISIT (OUTPATIENT)
Dept: FAMILY MEDICINE CLINIC | Facility: CLINIC | Age: 64
End: 2024-11-18
Payer: COMMERCIAL

## 2024-11-18 VITALS
BODY MASS INDEX: 47.09 KG/M2 | OXYGEN SATURATION: 95 % | RESPIRATION RATE: 20 BRPM | SYSTOLIC BLOOD PRESSURE: 138 MMHG | HEART RATE: 67 BPM | HEIGHT: 66 IN | DIASTOLIC BLOOD PRESSURE: 108 MMHG | WEIGHT: 293 LBS

## 2024-11-18 DIAGNOSIS — F17.210 CIGARETTE NICOTINE DEPENDENCE WITHOUT COMPLICATION: ICD-10-CM

## 2024-11-18 DIAGNOSIS — Z72.0 NICOTINE USE: ICD-10-CM

## 2024-11-18 DIAGNOSIS — E66.01 CLASS 3 SEVERE OBESITY DUE TO EXCESS CALORIES WITHOUT SERIOUS COMORBIDITY WITH BODY MASS INDEX (BMI) OF 50.0 TO 59.9 IN ADULT: ICD-10-CM

## 2024-11-18 DIAGNOSIS — F33.1 MODERATE EPISODE OF RECURRENT MAJOR DEPRESSIVE DISORDER: ICD-10-CM

## 2024-11-18 DIAGNOSIS — E66.813 CLASS 3 SEVERE OBESITY DUE TO EXCESS CALORIES WITHOUT SERIOUS COMORBIDITY WITH BODY MASS INDEX (BMI) OF 50.0 TO 59.9 IN ADULT: ICD-10-CM

## 2024-11-18 DIAGNOSIS — Z72.0 NICOTINE ABUSE: ICD-10-CM

## 2024-11-18 DIAGNOSIS — N32.89 BLADDER SPASMS: ICD-10-CM

## 2024-11-18 DIAGNOSIS — R82.90 FOUL SMELLING URINE: ICD-10-CM

## 2024-11-18 DIAGNOSIS — Z00.00 ENCOUNTER FOR WELL ADULT EXAM WITHOUT ABNORMAL FINDINGS: Primary | ICD-10-CM

## 2024-11-18 DIAGNOSIS — F17.200 NICOTINE USE DISORDER: ICD-10-CM

## 2024-11-18 DIAGNOSIS — Z12.31 SCREENING MAMMOGRAM FOR BREAST CANCER: ICD-10-CM

## 2024-11-18 LAB
BILIRUB BLD-MCNC: NEGATIVE MG/DL
CLARITY, POC: ABNORMAL
COLOR UR: ABNORMAL
EXPIRATION DATE: ABNORMAL
GLUCOSE UR STRIP-MCNC: NEGATIVE MG/DL
KETONES UR QL: NEGATIVE
LEUKOCYTE EST, POC: NEGATIVE
Lab: ABNORMAL
NITRITE UR-MCNC: NEGATIVE MG/ML
PH UR: 6 [PH] (ref 5–8)
PROT UR STRIP-MCNC: NEGATIVE MG/DL
RBC # UR STRIP: NEGATIVE /UL
SP GR UR: 1.02 (ref 1–1.03)
UROBILINOGEN UR QL: NORMAL

## 2024-11-18 RX ORDER — PHENTERMINE AND TOPIRAMATE 11.25; 69 MG/1; MG/1
1 CAPSULE, EXTENDED RELEASE ORAL
Qty: 30 CAPSULE | Refills: 0 | Status: SHIPPED | OUTPATIENT
Start: 2024-11-18

## 2024-11-18 RX ORDER — VENLAFAXINE HYDROCHLORIDE 150 MG/1
150 CAPSULE, EXTENDED RELEASE ORAL DAILY
Qty: 30 CAPSULE | Refills: 2 | Status: SHIPPED | OUTPATIENT
Start: 2024-11-18
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
## 2024-11-21 LAB
BACTERIA UR CULT: ABNORMAL
BACTERIA UR CULT: ABNORMAL
OTHER ANTIBIOTIC SUSC ISLT: ABNORMAL

## 2024-12-01 PROBLEM — K21.9 GASTROESOPHAGEAL REFLUX DISEASE: Status: RESOLVED | Noted: 2017-06-14 | Resolved: 2024-12-01

## 2024-12-01 PROBLEM — S00.83XA CONTUSION OF FOREHEAD: Status: RESOLVED | Noted: 2017-06-14 | Resolved: 2024-12-01
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
## 2024-12-01 PROBLEM — M25.551 BILATERAL HIP PAIN: Status: RESOLVED | Noted: 2023-04-23 | Resolved: 2024-12-01

## 2024-12-01 PROBLEM — N13.2 HYDRONEPHROSIS WITH URINARY OBSTRUCTION DUE TO URETERAL CALCULUS: Status: RESOLVED | Noted: 2018-12-06 | Resolved: 2024-12-01

## 2024-12-01 PROBLEM — M54.9 BACK PAIN: Status: RESOLVED | Noted: 2017-06-14 | Resolved: 2024-12-01

## 2024-12-01 PROBLEM — R30.0 DYSURIA: Status: RESOLVED | Noted: 2017-06-14 | Resolved: 2024-12-01

## 2024-12-01 PROBLEM — E07.9 DISORDER OF THYROID: Status: RESOLVED | Noted: 2017-06-14 | Resolved: 2024-12-01

## 2024-12-01 PROBLEM — M25.552 BILATERAL HIP PAIN: Status: RESOLVED | Noted: 2023-04-23 | Resolved: 2024-12-01

## 2024-12-01 PROBLEM — R31.9 HEMATURIA: Status: RESOLVED | Noted: 2017-06-14 | Resolved: 2024-12-01

## 2024-12-03 RX ORDER — VARENICLINE TARTRATE 1 MG/1
1 TABLET, FILM COATED ORAL 2 TIMES DAILY
Qty: 56 TABLET | Refills: 0 | Status: SHIPPED | OUTPATIENT
Start: 2024-12-03

## 2024-12-03 NOTE — TELEPHONE ENCOUNTER
Rx Refill Note  Requested Prescriptions     Pending Prescriptions Disp Refills    varenicline (CHANTIX) 1 MG tablet [Pharmacy Med Name: Varenicline Tartrate 1 MG Oral Tablet] 56 tablet 0     Sig: Take 1 tablet by mouth twice daily      Last office visit with prescribing clinician: 11/18/2024   Last telemedicine visit with prescribing clinician: Visit date not found   Next office visit with prescribing clinician: 2/18/2025

## 2024-12-11 DIAGNOSIS — M19.90 ARTHRITIS: ICD-10-CM

## 2024-12-11 RX ORDER — CELECOXIB 100 MG/1
100 CAPSULE ORAL 2 TIMES DAILY
Qty: 90 CAPSULE | Refills: 7 | Status: SHIPPED | OUTPATIENT
Start: 2024-12-11

## 2024-12-20 DIAGNOSIS — E66.813 CLASS 3 SEVERE OBESITY DUE TO EXCESS CALORIES WITHOUT SERIOUS COMORBIDITY WITH BODY MASS INDEX (BMI) OF 50.0 TO 59.9 IN ADULT: ICD-10-CM

## 2024-12-20 DIAGNOSIS — E66.01 CLASS 3 SEVERE OBESITY DUE TO EXCESS CALORIES WITHOUT SERIOUS COMORBIDITY WITH BODY MASS INDEX (BMI) OF 50.0 TO 59.9 IN ADULT: ICD-10-CM

## 2024-12-20 RX ORDER — PHENTERMINE AND TOPIRAMATE 11.25; 69 MG/1; MG/1
1 CAPSULE, EXTENDED RELEASE ORAL
Qty: 30 CAPSULE | Refills: 0 | Status: SHIPPED | OUTPATIENT
Start: 2024-12-20

## 2024-12-26 RX ORDER — VARENICLINE TARTRATE 1 MG/1
1 TABLET, FILM COATED ORAL 2 TIMES DAILY
Qty: 56 TABLET | Refills: 0 | Status: SHIPPED | OUTPATIENT
Start: 2024-12-26

## 2024-12-30 DIAGNOSIS — F51.01 PRIMARY INSOMNIA: ICD-10-CM

## 2024-12-31 ENCOUNTER — OFFICE VISIT (OUTPATIENT)
Dept: FAMILY MEDICINE CLINIC | Facility: CLINIC | Age: 64
End: 2024-12-31
Payer: COMMERCIAL

## 2024-12-31 VITALS
HEART RATE: 83 BPM | OXYGEN SATURATION: 97 % | SYSTOLIC BLOOD PRESSURE: 172 MMHG | DIASTOLIC BLOOD PRESSURE: 128 MMHG | RESPIRATION RATE: 20 BRPM

## 2024-12-31 DIAGNOSIS — N30.00 ACUTE CYSTITIS WITHOUT HEMATURIA: ICD-10-CM

## 2024-12-31 DIAGNOSIS — R30.9 URINARY PAIN: Primary | ICD-10-CM

## 2024-12-31 DIAGNOSIS — I10 ESSENTIAL HYPERTENSION: ICD-10-CM

## 2024-12-31 DIAGNOSIS — R05.3 CHRONIC COUGH: ICD-10-CM

## 2024-12-31 LAB
BILIRUB BLD-MCNC: ABNORMAL MG/DL
CLARITY, POC: ABNORMAL
COLOR UR: ABNORMAL
EXPIRATION DATE: ABNORMAL
GLUCOSE UR STRIP-MCNC: NEGATIVE MG/DL
KETONES UR QL: NEGATIVE
LEUKOCYTE EST, POC: NEGATIVE
Lab: ABNORMAL
NITRITE UR-MCNC: POSITIVE MG/ML
PH UR: 6.5 [PH] (ref 5–8)
PROT UR STRIP-MCNC: ABNORMAL MG/DL
RBC # UR STRIP: ABNORMAL /UL
SP GR UR: 1.02 (ref 1–1.03)
UROBILINOGEN UR QL: ABNORMAL

## 2024-12-31 PROCEDURE — 81003 URINALYSIS AUTO W/O SCOPE: CPT | Performed by: STUDENT IN AN ORGANIZED HEALTH CARE EDUCATION/TRAINING PROGRAM

## 2024-12-31 PROCEDURE — 99213 OFFICE O/P EST LOW 20 MIN: CPT | Performed by: STUDENT IN AN ORGANIZED HEALTH CARE EDUCATION/TRAINING PROGRAM

## 2024-12-31 RX ORDER — CEPHALEXIN 500 MG/1
500 CAPSULE ORAL 2 TIMES DAILY
Qty: 14 CAPSULE | Refills: 0 | Status: SHIPPED | OUTPATIENT
Start: 2024-12-31 | End: 2025-01-07

## 2024-12-31 RX ORDER — TRAZODONE HYDROCHLORIDE 50 MG/1
50 TABLET, FILM COATED ORAL NIGHTLY
Qty: 30 TABLET | Refills: 2 | Status: SHIPPED | OUTPATIENT
Start: 2024-12-31

## 2024-12-31 NOTE — PROGRESS NOTES
Mendoza Livingston DO  [facility] PRIMARY CARE  [facility]  [place] [id number] [phone]    Subjective      Name Iris Hyde MRN [id number]    1960 AGE/SEX 64 y.o. / female      Chief Complaint Chief Complaint   Patient presents with    Difficulty Urinating     Urine has a foul smell, back pain          Visit History for  2024    Iris Hyde is a 64 y.o. female who presented today for Difficulty Urinating (Urine has a foul smell, back pain )       History of Present Illness  The patient presents for evaluation of urinary tract infection, cough, and elevated blood pressure.    She reports experiencing severe lower back pain, which she attributes to her urinary tract infection (UTI). The pain is so intense that it impedes her ability to lift her legs for walking. She has a history of recurrent UTIs, typically caused by E. coli, and has been informed by her OB-GYN, Dr. Garces, that her rectum and vagina are unusually close together. This anatomical anomaly often results in UTIs following episodes of explosive diarrhea. She also suspects she may have irritable bowel syndrome (IBS) with constipation, as attempts to manage her constipation often lead to diarrhea. Her last antibiotic treatment was approximately a year ago during a trip to [place]. A urine culture from her previous visit revealed a strong ammonia odor and cloudiness, but no bacterial growth.    She reports a persistent cough, which she describes as hoarse. She occasionally coughs up a rubbery plug. She admits to not using her CPAP machine, which she dislikes, and notes that she snores heavily. She believes her symptoms are due to dryness and that her cough is productive of dried mucus. She is a smoker and has a CT scan scheduled for 2025. She has been undergoing annual lung scans due to a family history of lung cancer. She is currently taking Chantix to aid in smoking cessation and reports vivid  dreams as a side effect.    She acknowledges that her blood pressure remains elevated, which she believes is due to her back pain. She has lost weight, but she is still over 300 pounds. She is on medication for her blood pressure.       Medications and Allergies   Current Outpatient Medications   Medication Instructions    albuterol sulfate  (90 Base) MCG/ACT inhaler 2 puffs, Inhalation, Every 4 Hours PRN    celecoxib (CELEBREX) 100 mg, Oral, 2 Times Daily    cephalexin (KEFLEX) 500 mg, Oral, 2 Times Daily    cyclobenzaprine (FLEXERIL) 10 mg, Oral, 3 Times Daily PRN, for muscle spams    fluticasone (Flonase) 50 MCG/ACT nasal spray 1 spray in each nostril twice daily    levothyroxine (SYNTHROID, LEVOTHROID) 200 mcg, Oral, Daily    losartan (COZAAR) 25 mg, Oral, 2 Times Daily    Phentermine-Topiramate (Qsymia) 11.25-69 MG capsule sustained-release 24 hr 1 Capful, Oral, Every Morning Before Breakfast    promethazine (PHENERGAN) 25 mg, Oral, Every 6 Hours PRN    traZODone (DESYREL) 50 mg, Oral, Nightly    varenicline (CHANTIX) 1 mg, Oral, 2 Times Daily    venlafaxine XR (EFFEXOR-XR) 150 mg, Oral, Daily    Vibegron (Gemtesa) 75 MG tablet     Vitamin D-Vitamin K (VITAMIN K2-VITAMIN D3 PO) Take  by mouth.     Allergies   Allergen Reactions    Lisinopril Cough    Butorphanol Tartrate Other (See Comments)     PSYCHOTIC    Hydrocodone-Acetaminophen Itching     NORCO AND VICODIN    Cymbalta [Duloxetine Hcl] Other (See Comments)     SUICIDAL IDEATION      I have reviewed the above medications and allergies     Objective:      Vitals Vitals:    12/31/24 1213   BP: (!) 172/128   BP Location: Left arm   Patient Position: Sitting   Cuff Size: Large Adult   Pulse: 83   Resp: 20   SpO2: 97%     There is no height or weight on file to calculate BMI.    Physical Exam  Vitals reviewed.   Constitutional:       General: She is not in acute distress.     Appearance: She is not ill-appearing.   Cardiovascular:      Rate and Rhythm:  Normal rate and regular rhythm.   Pulmonary:      Effort: Pulmonary effort is normal.      Breath sounds: Normal breath sounds. No wheezing or rhonchi.   Neurological:      Mental Status: She is alert.   Psychiatric:         Mood and Affect: Mood normal.         Behavior: Behavior normal.         Thought Content: Thought content normal.         Judgment: Judgment normal.          Physical Exam       Results       Assessment/Plan   Issues Addressed/ Plan   Diagnosis Plan   1. Urinary pain  POC Urinalysis Dipstick, Automated      2. Acute cystitis without hematuria  cephalexin (Keflex) 500 MG capsule    Urine Culture - Urine, Urine, Clean Catch      3. Chronic cough        4. Essential hypertension           Assessment & Plan  1. Urinary Tract Infection (UTI).  The patient reports symptoms consistent with a UTI, including low back pain and difficulty walking due to pain. She has a history of recurrent UTIs, often caused by E. coli. Previous treatments with Bactrim DS and Cipro have led to resistance. A urine sample will be sent for culture to confirm the presence of E. coli. She will be started on Keflex 500 mg, to be taken twice daily for 7 days.    2. Cough.  The patient reports a persistent cough with occasional production of a rubbery plug. She has a history of smoking and emphysema, with a scheduled CT scan in January to assess her lung condition. She is currently taking Chantix to aid in smoking cessation. The CT scan results will be reviewed to determine if there is any worsening of her emphysema.    3. Elevated blood pressure.  The patient's blood pressure remains high, which she attributes to her back pain. She has lost weight since her last visit, which is a positive sign. She will continue her current medication regimen, and an increased dose will be considered at the next visit.           There are no Patient Instructions on file for this visit.   Follow up  recommended Return in about 3 months (around  3/31/2025).   - Dragon voice recognition software was utilized to complete this chart.  Every reasonable attempt was made to edit and correct the text, however some incorrect words may remain.   Mendoza Livingston DO    Patient or patient representative verbalized consent for the use of Ambient Listening during the visit with  Mendoza Livingston DO for chart documentation. 12/31/2024  17:48 EST

## 2025-01-02 LAB
BACTERIA UR CULT: NORMAL
BACTERIA UR CULT: NORMAL

## 2025-01-10 DIAGNOSIS — I10 ESSENTIAL HYPERTENSION: ICD-10-CM

## 2025-01-10 RX ORDER — LOSARTAN POTASSIUM 25 MG/1
25 TABLET ORAL 2 TIMES DAILY
Qty: 180 TABLET | Refills: 0 | Status: SHIPPED | OUTPATIENT
Start: 2025-01-10

## 2025-01-21 ENCOUNTER — HOSPITAL ENCOUNTER (OUTPATIENT)
Dept: MAMMOGRAPHY | Facility: HOSPITAL | Age: 65
Discharge: HOME | End: 2025-01-21
Payer: COMMERCIAL

## 2025-01-21 ENCOUNTER — HOSPITAL ENCOUNTER (OUTPATIENT)
Dept: CT IMAGING | Facility: HOSPITAL | Age: 65
Discharge: HOME | End: 2025-01-21
Payer: COMMERCIAL

## 2025-01-21 DIAGNOSIS — Z12.31 SCREENING MAMMOGRAM FOR BREAST CANCER: ICD-10-CM

## 2025-01-21 DIAGNOSIS — F17.210 CIGARETTE NICOTINE DEPENDENCE WITHOUT COMPLICATION: ICD-10-CM

## 2025-01-21 PROCEDURE — 71271 CT THORAX LUNG CANCER SCR C-: CPT

## 2025-01-21 PROCEDURE — 77067 SCR MAMMO BI INCL CAD: CPT

## 2025-01-21 PROCEDURE — 77063 BREAST TOMOSYNTHESIS BI: CPT

## 2025-02-18 ENCOUNTER — OFFICE VISIT (OUTPATIENT)
Dept: FAMILY MEDICINE CLINIC | Facility: CLINIC | Age: 65
End: 2025-02-18
Payer: COMMERCIAL

## 2025-02-18 VITALS
HEIGHT: 66 IN | SYSTOLIC BLOOD PRESSURE: 134 MMHG | WEIGHT: 293 LBS | HEART RATE: 86 BPM | BODY MASS INDEX: 47.09 KG/M2 | OXYGEN SATURATION: 96 % | DIASTOLIC BLOOD PRESSURE: 108 MMHG

## 2025-02-18 DIAGNOSIS — E03.9 ACQUIRED HYPOTHYROIDISM: ICD-10-CM

## 2025-02-18 DIAGNOSIS — E66.813 CLASS 3 SEVERE OBESITY DUE TO EXCESS CALORIES WITHOUT SERIOUS COMORBIDITY WITH BODY MASS INDEX (BMI) OF 50.0 TO 59.9 IN ADULT: Primary | ICD-10-CM

## 2025-02-18 DIAGNOSIS — I10 ESSENTIAL HYPERTENSION: ICD-10-CM

## 2025-02-18 DIAGNOSIS — F33.1 MODERATE EPISODE OF RECURRENT MAJOR DEPRESSIVE DISORDER: ICD-10-CM

## 2025-02-18 DIAGNOSIS — M19.90 ARTHRITIS: ICD-10-CM

## 2025-02-18 DIAGNOSIS — E66.01 CLASS 3 SEVERE OBESITY DUE TO EXCESS CALORIES WITHOUT SERIOUS COMORBIDITY WITH BODY MASS INDEX (BMI) OF 50.0 TO 59.9 IN ADULT: Primary | ICD-10-CM

## 2025-02-18 DIAGNOSIS — F17.210 CIGARETTE NICOTINE DEPENDENCE WITHOUT COMPLICATION: ICD-10-CM

## 2025-02-18 DIAGNOSIS — F51.01 PRIMARY INSOMNIA: ICD-10-CM

## 2025-02-18 DIAGNOSIS — R11.0 NAUSEA: ICD-10-CM

## 2025-02-18 DIAGNOSIS — G47.33 OSA ON CPAP: ICD-10-CM

## 2025-02-18 RX ORDER — CELECOXIB 100 MG/1
100 CAPSULE ORAL 2 TIMES DAILY
Qty: 90 CAPSULE | Refills: 7 | Status: SHIPPED | OUTPATIENT
Start: 2025-02-18

## 2025-02-18 RX ORDER — VARENICLINE TARTRATE 1 MG/1
1 TABLET, FILM COATED ORAL 2 TIMES DAILY
Qty: 56 TABLET | Refills: 0 | Status: SHIPPED | OUTPATIENT
Start: 2025-02-18

## 2025-02-18 RX ORDER — VENLAFAXINE HYDROCHLORIDE 150 MG/1
150 CAPSULE, EXTENDED RELEASE ORAL DAILY
Qty: 30 CAPSULE | Refills: 2 | Status: SHIPPED | OUTPATIENT
Start: 2025-02-18

## 2025-02-18 RX ORDER — LOSARTAN POTASSIUM 25 MG/1
25 TABLET ORAL 2 TIMES DAILY
Qty: 180 TABLET | Refills: 0 | Status: SHIPPED | OUTPATIENT
Start: 2025-02-18

## 2025-02-18 RX ORDER — TRAZODONE HYDROCHLORIDE 50 MG/1
50 TABLET ORAL NIGHTLY
Qty: 30 TABLET | Refills: 2 | Status: SHIPPED | OUTPATIENT
Start: 2025-02-18

## 2025-02-18 RX ORDER — LEVOTHYROXINE SODIUM 200 UG/1
200 TABLET ORAL DAILY
Qty: 180 TABLET | Refills: 0 | Status: SHIPPED | OUTPATIENT
Start: 2025-02-18

## 2025-02-18 RX ORDER — PROMETHAZINE HYDROCHLORIDE 25 MG/1
25 TABLET ORAL EVERY 6 HOURS PRN
Qty: 90 TABLET | Refills: 0 | Status: SHIPPED | OUTPATIENT
Start: 2025-02-18

## 2025-02-18 RX ORDER — SEMAGLUTIDE 0.25 MG/.5ML
0.5 INJECTION, SOLUTION SUBCUTANEOUS WEEKLY
Qty: 2 ML | Refills: 0 | Status: SHIPPED | OUTPATIENT
Start: 2025-02-18

## 2025-02-19 NOTE — PROGRESS NOTES
Mendoza Livingston DO  [facility]  [address]  [place] [id number] [phone]    Subjective      Name Iris Hyde MRN [id number]    1960 AGE/SEX 64 y.o. / female      Chief Complaint Chief Complaint   Patient presents with    Obesity     Here for check up, feels like Qsymia is not been effective in weight loss    Sore Throat     Sore throat, cough. Coughing so hard that's she is vomiting. Was exposed to strep and Flu A. Shortness of breath, feels like there is something on throat that can't be coughed up. States symptoms have been going on for a month.          Visit History for  2025    Iris Hyde is a 64 y.o. female who presented today for Obesity (Here for check up, feels like Qsymia is not been effective in weight loss) and Sore Throat (Sore throat, cough. Coughing so hard that's she is vomiting. Was exposed to strep and Flu A. Shortness of breath, feels like there is something on throat that can't be coughed up. States symptoms have been going on for a month. )       History of Present Illness  She has been off her antihypertensive medication, losartan, for approximately one month due to a change in her pharmacy provider, which she was not informed about. She reports that her blood pressure readings have been elevated during this period.    She has been on Qsymia for the past 3 to 4 months, resulting in a weight loss of 5 pounds. Her current weight is 282 pounds, down from an initial weight of 308 pounds. She has a history of participating in Weight Watchers, where she lost 5 pounds over a 6-week period but noticed significant inch loss. She also reports a history of gastroparesis, which she believes was exacerbated by a stomach virus a few weeks ago. She has not yet explored the option of weight loss medications. She experiences difficulty with personal hygiene due to her weight, particularly in areas under her breasts, groin, and abdominal fold.  She uses Remedy cream and Desenex for these areas. She has been advised by her gynecologist to wear skirts without underwear to prevent infections. She has a history of wearing tight clothing but currently wears a size 24.    She has been diagnosed with fibromyalgia and osteoarthritis, affecting her back, hips, shoulders, and knees. She is scheduled for hip replacement surgery but needs to lose 50 pounds prior to the procedure. She takes Cymbalta 200 mg at bedtime, which she reports as being highly effective. She also takes Tylenol Arthritis Strength, two tablets in the morning and two at night, totaling 2600 mg per day. She occasionally takes Flexeril during the day.    She has been experiencing insomnia. She is requesting a refill of her trazodone prescription.    She has been attempting to quit smoking, with a recent reduction to 3 cigarettes over an 8-day period. She is requesting a refill of her Chantix prescription.    She is requesting refills of her medications, including losartan, Chantix, and trazodone. She has been off her antihypertensive medication, losartan, for approximately one month due to a change in her pharmacy provider, which she was not informed about. She reports that her blood pressure readings have been elevated during this period. She has been attempting to quit smoking, with a recent reduction to 3 cigarettes over an 8-day period. She is requesting a refill of her Chantix prescription. She has been experiencing insomnia and is requesting a refill of her trazodone prescription. She has been on Qsymia for the past 3 to 4 months, resulting in a weight loss of 5 pounds. Her current weight is 282 pounds, down from an initial weight of 308 pounds. She has a history of participating in Weight Watchers, where she lost 5 pounds over a 6-week period but noticed significant inch loss. She also reports a history of gastroparesis, which she believes was exacerbated by a stomach virus a few weeks ago.  She has not yet explored the option of weight loss medications. She experiences difficulty with personal hygiene due to her weight, particularly in areas under her breasts, groin, and abdominal fold. She uses Remedy cream and Desenex for these areas. She has been advised by her gynecologist to wear skirts without underwear to prevent infections. She has a history of wearing tight clothing but currently wears a size 24. She has been diagnosed with fibromyalgia and osteoarthritis, affecting her back, hips, shoulders, and knees. She is scheduled for hip replacement surgery but needs to lose 50 pounds prior to the procedure. She takes Cymbalta 200 mg at bedtime, which she reports as being highly effective. She also takes Tylenol Arthritis Strength, two tablets in the morning and two at night, totaling 2600 mg per day. She occasionally takes Flexeril during the day.         Medications and Allergies   Current Outpatient Medications   Medication Instructions    albuterol sulfate  (90 Base) MCG/ACT inhaler 2 puffs, Inhalation, Every 4 Hours PRN    celecoxib (CELEBREX) 100 mg, Oral, 2 Times Daily    cyclobenzaprine (FLEXERIL) 10 mg, Oral, 3 Times Daily PRN, for muscle spams    fluticasone (Flonase) 50 MCG/ACT nasal spray 1 spray in each nostril twice daily    levothyroxine (SYNTHROID, LEVOTHROID) 200 mcg, Oral, Daily    losartan (COZAAR) 25 mg, Oral, 2 Times Daily    Phentermine-Topiramate (Qsymia) 11.25-69 MG capsule sustained-release 24 hr 1 Capful, Oral, Every Morning Before Breakfast    promethazine (PHENERGAN) 25 mg, Oral, Every 6 Hours PRN    promethazine-dextromethorphan (PROMETHAZINE-DM) 6.25-15 MG/5ML syrup 5 mL, Oral, 4 Times Daily PRN    traZODone (DESYREL) 50 mg, Oral, Nightly    varenicline (CHANTIX) 1 mg, Oral, 2 Times Daily    venlafaxine XR (EFFEXOR-XR) 150 mg, Oral, Daily    Vibegron (Gemtesa) 75 MG tablet     Vitamin D-Vitamin K (VITAMIN K2-VITAMIN D3 PO) Take  by mouth.    Wegovy 0.5 mg,  "Subcutaneous, Weekly     Allergies   Allergen Reactions    Lisinopril Cough    Butorphanol Tartrate Other (See Comments)     PSYCHOTIC    Hydrocodone-Acetaminophen Itching     NORCO AND VICODIN    Cymbalta [Duloxetine Hcl] Other (See Comments)     SUICIDAL IDEATION      I have reviewed the above medications and allergies     Objective:      Vitals Vitals:    02/18/25 1310   BP: (!) 134/108   BP Location: Left arm   Patient Position: Sitting   Cuff Size: Large Adult   Pulse: 86   SpO2: 96%   Weight: 135 kg (297 lb)   Height: 167.6 cm (66\")     Body mass index is 47.94 kg/m².    Physical Exam  Vitals reviewed.   Constitutional:       General: She is not in acute distress.     Appearance: She is not ill-appearing.   Pulmonary:      Effort: Pulmonary effort is normal.   Neurological:      Mental Status: She is alert.   Psychiatric:         Mood and Affect: Mood normal.         Behavior: Behavior normal.         Thought Content: Thought content normal.         Judgment: Judgment normal.        PHQ-9 Depression Screening  Little interest or pleasure in doing things? Over half   Feeling down, depressed, or hopeless? Over half   PHQ-2 Total Score 4   Trouble falling or staying asleep, or sleeping too much? Almost all   Feeling tired or having little energy? Over half   Poor appetite or overeating? Over half   Feeling bad about yourself - or that you are a failure or have let yourself or your family down? Over half   Trouble concentrating on things, such as reading the newspaper or watching television? Over half   Moving or speaking so slowly that other people could have noticed? Or the opposite - being so fidgety or restless that you have been moving around a lot more than usual? Over half   Thoughts that you would be better off dead, or of hurting yourself in some way? Several days   PHQ-9 Total Score 18   If you checked off any problems, how difficult have these problems made it for you to do your work, take care of " things at home, or get along with other people? Somewhat difficult      Physical Exam  Vital Signs  Blood pressure reading is 127/88.     Results       Assessment/Plan   Issues Addressed/ Plan   Diagnosis Plan   1. Class 3 severe obesity due to excess calories without serious comorbidity with body mass index (BMI) of 50.0 to 59.9 in adult  Semaglutide-Weight Management (Wegovy) 0.25 MG/0.5ML solution auto-injector      2. Essential hypertension  losartan (COZAAR) 25 MG tablet      3. Acquired hypothyroidism  levothyroxine (SYNTHROID, LEVOTHROID) 200 MCG tablet      4. Moderate episode of recurrent major depressive disorder  venlafaxine XR (EFFEXOR-XR) 150 MG 24 hr capsule      5. Primary insomnia  traZODone (DESYREL) 50 MG tablet      6. Arthritis  celecoxib (CeleBREX) 100 MG capsule      7. SUDEEP on CPAP  Semaglutide-Weight Management (Wegovy) 0.25 MG/0.5ML solution auto-injector      8. Cigarette nicotine dependence without complication  varenicline (CHANTIX) 1 MG tablet      9. Nausea  promethazine (PHENERGAN) 25 MG tablet         Assessment & Plan  1. Hypertension.  Her blood pressure has been elevated due to discontinuation of her antihypertensive medication for approximately one month. A prescription for losartan will be sent to [facility].    2. Obstructive sleep apnea.  She has been diagnosed with obstructive sleep apnea. A prescription for Wegovy will be provided to aid in weight loss, which may improve her sleep apnea symptoms. She is advised to monitor for any side effects such as indigestion or constipation and to use Colace for constipation and omeprazole or Pepcid for reflux.    3. Smoking cessation.  She has significantly reduced her smoking, currently smoking only 3 cigarettes over 8 days. A prescription for Chantix will be sent to [facility] to support her efforts in quitting smoking.    4. Weight management.  She has lost nearly 20 pounds and is under 300 pounds. A prescription for Wegovy will be provided  to aid in further weight loss. She is advised to monitor for any side effects such as indigestion or constipation and to use Colace for constipation and omeprazole or Pepcid for reflux. If Wegovy is not approved, she will continue with Qsymia, increasing the dose to 15 mg.    5. Fibromyalgia.  She has been diagnosed with fibromyalgia and osteoarthritis, affecting her back, hips, shoulders, and knees. She is scheduled for hip replacement surgery but needs to lose 50 pounds prior to the procedure. She is advised to continue her current regimen of Cymbalta 200 mg at bedtime and Tylenol Arthritis Strength 1300 mg twice daily. She may take an additional Tylenol dose at lunch if needed.    6. Insomnia.  She has been experiencing insomnia. A prescription for trazodone will be sent to [facility] to help with her sleep issues.    7. Medication management.  She is requesting refills of her medications, including losartan, Chantix, and trazodone. All necessary prescriptions will be sent to [facility].           There are no Patient Instructions on file for this visit.   Follow up  recommended Return in about 3 months (around 5/18/2025).   - Dragon voice recognition software was utilized to complete this chart.  Every reasonable attempt was made to edit and correct the text, however some incorrect words may remain.   Mendoza Livingston DO    Patient or patient representative verbalized consent for the use of Ambient Listening during the visit with  Mendoza Livingston DO for chart documentation. 3/9/2025  19:53 EST

## 2025-02-28 ENCOUNTER — TELEPHONE (OUTPATIENT)
Dept: FAMILY MEDICINE CLINIC | Facility: CLINIC | Age: 65
End: 2025-02-28
Payer: COMMERCIAL

## 2025-02-28 DIAGNOSIS — R05.1 ACUTE COUGH: Primary | ICD-10-CM

## 2025-02-28 NOTE — TELEPHONE ENCOUNTER
Iris ANDUJAR k [facility] (supporting Mendoza Livingston DO)19 hours ago (2:02 PM)       Rhonchi, not bronchi. Silly autocorrect.       Iris ANDUJAR k [facility] (supporting Mendoza Livingston DO)19 hours ago (2:00 PM)       Dr. Livingston,  I didn’t even say anything to you last week about this stupid cough I have. I also didn’t ask you about my CT for lung cancer results. I looked at them and it looked ok to me but I just wanted to be sure. Anyway, this cough is about to choke me to death. Would you please call me in some cough syrup. The last time I had this cough (1/24 in [place]) they gave me one with Phenergan in it but I don’t know the name of it. When I worked at [facility], Dr. Zavala used to give me Codimal with codeine. I have bronchi and expiratory wheezes. Thanks. Iris

## 2025-03-05 RX ORDER — DEXTROMETHORPHAN HYDROBROMIDE AND PROMETHAZINE HYDROCHLORIDE 15; 6.25 MG/5ML; MG/5ML
5 SYRUP ORAL 4 TIMES DAILY PRN
Qty: 118 ML | Refills: 0 | Status: SHIPPED | OUTPATIENT
Start: 2025-03-05

## 2025-03-09 ENCOUNTER — RESULTS FOLLOW-UP (OUTPATIENT)
Dept: MAMMOGRAPHY | Facility: HOSPITAL | Age: 65
End: 2025-03-09
Payer: COMMERCIAL

## 2025-04-01 ENCOUNTER — TRANSCRIBE ORDERS (OUTPATIENT)
Dept: ADMINISTRATIVE | Facility: HOSPITAL | Age: 65
End: 2025-04-01
Payer: COMMERCIAL

## 2025-04-01 DIAGNOSIS — R91.1 PULMONARY NODULE: Primary | ICD-10-CM

## 2025-04-08 DIAGNOSIS — R11.0 NAUSEA: ICD-10-CM

## 2025-04-08 DIAGNOSIS — E66.813 CLASS 3 SEVERE OBESITY DUE TO EXCESS CALORIES WITHOUT SERIOUS COMORBIDITY WITH BODY MASS INDEX (BMI) OF 50.0 TO 59.9 IN ADULT: ICD-10-CM

## 2025-04-08 DIAGNOSIS — E66.01 CLASS 3 SEVERE OBESITY DUE TO EXCESS CALORIES WITHOUT SERIOUS COMORBIDITY WITH BODY MASS INDEX (BMI) OF 50.0 TO 59.9 IN ADULT: ICD-10-CM

## 2025-04-08 RX ORDER — PHENTERMINE AND TOPIRAMATE 11.25; 69 MG/1; MG/1
1 CAPSULE, EXTENDED RELEASE ORAL
Qty: 30 CAPSULE | Refills: 0 | Status: CANCELLED | OUTPATIENT
Start: 2025-04-08

## 2025-04-11 RX ORDER — PHENTERMINE AND TOPIRAMATE 15; 92 MG/1; MG/1
1 CAPSULE, EXTENDED RELEASE ORAL
Qty: 30 CAPSULE | Refills: 0 | Status: SHIPPED | OUTPATIENT
Start: 2025-04-11

## 2025-04-11 RX ORDER — PROMETHAZINE HYDROCHLORIDE 25 MG/1
25 TABLET ORAL EVERY 6 HOURS PRN
Qty: 90 TABLET | Refills: 0 | Status: SHIPPED | OUTPATIENT
Start: 2025-04-11

## 2025-04-15 ENCOUNTER — HOSPITAL ENCOUNTER (OUTPATIENT)
Dept: CT IMAGING | Facility: HOSPITAL | Age: 65
Discharge: HOME | End: 2025-04-15
Admitting: INTERNAL MEDICINE
Payer: COMMERCIAL

## 2025-04-15 DIAGNOSIS — R91.1 PULMONARY NODULE: ICD-10-CM

## 2025-04-15 PROCEDURE — 71250 CT THORAX DX C-: CPT

## 2025-05-20 ENCOUNTER — OFFICE VISIT (OUTPATIENT)
Dept: FAMILY MEDICINE CLINIC | Facility: CLINIC | Age: 65
End: 2025-05-20
Payer: COMMERCIAL

## 2025-05-20 ENCOUNTER — PRIOR AUTHORIZATION (OUTPATIENT)
Dept: FAMILY MEDICINE CLINIC | Facility: CLINIC | Age: 65
End: 2025-05-20

## 2025-05-20 VITALS
SYSTOLIC BLOOD PRESSURE: 128 MMHG | BODY MASS INDEX: 47.09 KG/M2 | HEIGHT: 66 IN | HEART RATE: 80 BPM | OXYGEN SATURATION: 95 % | WEIGHT: 293 LBS | RESPIRATION RATE: 20 BRPM | DIASTOLIC BLOOD PRESSURE: 98 MMHG

## 2025-05-20 DIAGNOSIS — E03.9 ACQUIRED HYPOTHYROIDISM: ICD-10-CM

## 2025-05-20 DIAGNOSIS — E78.2 MIXED HYPERLIPIDEMIA: ICD-10-CM

## 2025-05-20 DIAGNOSIS — R53.83 FATIGUE, UNSPECIFIED TYPE: ICD-10-CM

## 2025-05-20 DIAGNOSIS — E66.813 CLASS 3 SEVERE OBESITY DUE TO EXCESS CALORIES WITHOUT SERIOUS COMORBIDITY WITH BODY MASS INDEX (BMI) OF 50.0 TO 59.9 IN ADULT: ICD-10-CM

## 2025-05-20 DIAGNOSIS — I10 ESSENTIAL HYPERTENSION: ICD-10-CM

## 2025-05-20 DIAGNOSIS — E66.01 CLASS 3 SEVERE OBESITY DUE TO EXCESS CALORIES WITHOUT SERIOUS COMORBIDITY WITH BODY MASS INDEX (BMI) OF 50.0 TO 59.9 IN ADULT: ICD-10-CM

## 2025-05-20 DIAGNOSIS — F33.1 MODERATE EPISODE OF RECURRENT MAJOR DEPRESSIVE DISORDER: ICD-10-CM

## 2025-05-20 DIAGNOSIS — F51.01 PRIMARY INSOMNIA: Primary | ICD-10-CM

## 2025-05-20 DIAGNOSIS — F17.210 CIGARETTE NICOTINE DEPENDENCE WITHOUT COMPLICATION: ICD-10-CM

## 2025-05-20 DIAGNOSIS — E53.8 FOLATE DEFICIENCY: ICD-10-CM

## 2025-05-20 RX ORDER — VARENICLINE TARTRATE 1 MG/1
1 TABLET, FILM COATED ORAL 2 TIMES DAILY
Qty: 56 TABLET | Refills: 0 | Status: SHIPPED | OUTPATIENT
Start: 2025-05-20

## 2025-05-20 RX ORDER — PHENTERMINE AND TOPIRAMATE 15; 92 MG/1; MG/1
1 CAPSULE, EXTENDED RELEASE ORAL
Qty: 30 CAPSULE | Refills: 0 | Status: SHIPPED | OUTPATIENT
Start: 2025-05-20

## 2025-05-20 RX ORDER — IPRATROPIUM BROMIDE AND ALBUTEROL SULFATE 2.5; .5 MG/3ML; MG/3ML
SOLUTION RESPIRATORY (INHALATION)
COMMUNITY
Start: 2025-04-21

## 2025-05-20 RX ORDER — ZOLPIDEM TARTRATE 5 MG/1
5 TABLET ORAL NIGHTLY PRN
Qty: 30 TABLET | Refills: 2 | Status: SHIPPED | OUTPATIENT
Start: 2025-05-20

## 2025-05-20 NOTE — PROGRESS NOTES
Venipuncture Blood Specimen Collection  Venipuncture performed in left arm by Sandy Lim MA with good hemostasis. Patient tolerated the procedure well without complications.   05/20/25   Sandy Lim MA

## 2025-05-20 NOTE — PROGRESS NOTES
Mendoza Livingston DO  [facility]  [address]  [place] [id number] [phone]    Subjective      Name Iris Hyde MRN [id number]    1960 AGE/SEX 65 y.o. / female      Chief Complaint Chief Complaint   Patient presents with    Obesity     3 month follow up. Stopped taking the Wegovy due to increase GI intolerance. After first shot she was sick for 5 days straight . Was supposed to get the increased dosage of Qysima and wasn't given at refill so hasn't filled it. Having issues with focus     Depression     Has been having a lot with depression. Has been crying a lot          Visit History for  2025    Iris Hyde is a 65 y.o. female who presented today for Obesity (3 month follow up. Stopped taking the Wegovy due to increase GI intolerance. After first shot she was sick for 5 days straight . Was supposed to get the increased dosage of Qysima and wasn't given at refill so hasn't filled it. Having issues with focus ) and Depression (Has been having a lot with depression. Has been crying a lot )       History of Present Illness  The patient presents for evaluation of weight management, cough, sleep apnea, and insomnia.    She has been unable to achieve weight loss despite discontinuing Wegovy due to adverse effects. She experienced a 5-day episode of vomiting, during which she was unable to retain even water, attributing it to her gastroparesis. She is seeking a blood test to assess her cortisol levels, given her history of oophorectomy and elevated thyroid levels. She is currently on Qsymia 11 mg, which she reports as ineffective in aiding weight loss. She has requested an increase in the dosage via "[employer]hart but was informed by the pharmacy that the dosage remains unchanged. She ran out of the medication a few days ago. She also reports persistent fatigue, lack of focus, and constant bloating. She has been on thyroid medication but has not had her levels  checked recently.    She reports a persistent cough, which she describes as severe and life-threatening. She missed a scheduled appointment with her pulmonologist due to insomnia but has since rescheduled it. She is scheduled for a pulmonary function test and has been prescribed DuoNebs, which she reports as ineffective. She experiences daily episodes of breathlessness, leading to panic attacks. She uses an albuterol rescue inhaler and DuoNebs. She has a follow-up appointment with her pulmonologist on 06/05/2025. A recent CT scan showed inflammation. She uses marijuana for pain relief and reports hip pain. She has not taken Celebrex for the past 3 to 4 days due to severe headaches. She occasionally consumes caffeine but primarily drinks water. She wakes up frequently at night to urinate. She has been diagnosed with sleep apnea and uses a CPAP machine. She is currently on Chantix and takes it on an empty stomach in the morning.    She is seeking medication to improve her sleep quality. She has been taking trazodone 50 mg and Benadryl 50 mg, which she reports as ineffective. She also takes Flexeril at bedtime, which previously provided 8 hours of sleep but no longer induces sleepiness. She has not tried Ambien before.    She reports constant sadness and frequent crying episodes. She is currently on Effexor 150 mg but continues to experience emotional distress.       Medications and Allergies   Current Outpatient Medications   Medication Instructions    albuterol sulfate  (90 Base) MCG/ACT inhaler 2 puffs, Inhalation, Every 4 Hours PRN    celecoxib (CELEBREX) 100 mg, Oral, 2 Times Daily    cyclobenzaprine (FLEXERIL) 10 mg, Oral, 3 Times Daily PRN, for muscle spams    fluticasone (Flonase) 50 MCG/ACT nasal spray 1 spray in each nostril twice daily    ipratropium-albuterol (DUO-NEB) 0.5-2.5 mg/3 ml nebulizer  INHALE 1 VIAL BY NEBULIZER EVERY 6 HOURS AS NEEDED FOR COUGH    levothyroxine (SYNTHROID, LEVOTHROID) 200  "mcg, Oral, Daily    losartan (COZAAR) 25 mg, Oral, 2 Times Daily    Phentermine-Topiramate (Qsymia) 15-92 MG capsule sustained-release 24 hr 1 capsule, Oral, Every Morning Before Breakfast    promethazine (PHENERGAN) 25 mg, Oral, Every 6 Hours PRN    promethazine-dextromethorphan (PROMETHAZINE-DM) 6.25-15 MG/5ML syrup 5 mL, Oral, 4 Times Daily PRN    varenicline (CHANTIX) 1 mg, Oral, 2 Times Daily    venlafaxine XR (EFFEXOR-XR) 150 mg, Oral, Daily    Vibegron (Gemtesa) 75 MG tablet     Vitamin D-Vitamin K (VITAMIN K2-VITAMIN D3 PO) Take  by mouth.    zolpidem (AMBIEN) 5 mg, Oral, Nightly PRN     Allergies   Allergen Reactions    Lisinopril Cough    Butorphanol Tartrate Other (See Comments)     PSYCHOTIC    Hydrocodone-Acetaminophen Itching     NORCO AND VICODIN    Cymbalta [Duloxetine Hcl] Other (See Comments)     SUICIDAL IDEATION      I have reviewed the above medications and allergies     Objective:      Vitals Vitals:    05/20/25 1302   BP: 128/98   BP Location: Left arm   Patient Position: Sitting   Cuff Size: Large Adult   Pulse: 80   Resp: 20   SpO2: 95%   Weight: (!) 140 kg (309 lb)   Height: 167.6 cm (66\")     Body mass index is 49.87 kg/m².    Physical Exam  Vitals reviewed.   Constitutional:       General: She is not in acute distress.     Appearance: She is not ill-appearing.   Cardiovascular:      Rate and Rhythm: Normal rate and regular rhythm.   Pulmonary:      Breath sounds: Wheezing present.   Psychiatric:         Mood and Affect: Mood normal.         Behavior: Behavior normal.         Thought Content: Thought content normal.         Judgment: Judgment normal.          Physical Exam       Results  Imaging   - CT scan: Inflammation     Assessment/Plan   Issues Addressed/ Plan   Diagnosis Plan   1. Primary insomnia  zolpidem (Ambien) 5 MG tablet      2. Cigarette nicotine dependence without complication  varenicline (CHANTIX) 1 MG tablet      3. Class 3 severe obesity due to excess calories without " serious comorbidity with body mass index (BMI) of 50.0 to 59.9 in adult  Phentermine-Topiramate (Qsymia) 15-92 MG capsule sustained-release 24 hr      4. Essential hypertension  Comprehensive metabolic panel      5. Acquired hypothyroidism  TSH    T4, free    T3, free      6. Moderate episode of recurrent major depressive disorder        7. Fatigue, unspecified type  Comprehensive metabolic panel    CBC w AUTO Differential    Vitamin B12    Folate      8. Mixed hyperlipidemia  Lipid panel         Assessment & Plan  1. Weight management.  - Unable to lose weight and has discontinued Wegovy due to severe side effects, including vomiting and inability to keep water down.  - Laboratory tests will be conducted to assess thyroid and cortisol levels.  - Prescription for Qsymia 15 mg will be sent to pharmacy.  - Discussion about switching to Ozempic as an alternative was mentioned.    2. Cough.  - Experiences daily coughing and shortness of breath, leading to panic attacks.  - Physical exam findings include expiratory wheezes.  - Pulmonary function tests (PFTs) are scheduled for 06/05/2025.  - Sample of an inhaler will be provided for temporary relief until PFTs are completed.    3. Sleep apnea.  - Severe sleep apnea with inconsistent use of CPAP mask.  - Advised to wear CPAP mask regularly to improve sleep quality and overall health.  - Discussion about the importance of winding down with the mask on before sleep.    4. Insomnia.  - Struggling with insomnia and has been taking 50 mg of trazodone and 50 mg of Benadryl without significant relief.  - Prescription for Ambien will be provided.  - Instructed to take Ambien 30 minutes before bedtime after completing nightly routine and ensuring all tasks are completed to avoid waking up during the night.  - If Ambien does not help, contact the office.    5. Depression.  - Continues to experience symptoms of depression despite taking 150 mg of Effexor.  - Improving sleep quality  is expected to help alleviate some depressive symptoms.  - If symptoms persist, further evaluation and potential adjustment of medication may be necessary.           There are no Patient Instructions on file for this visit.   Follow up  recommended Return in about 1 month (around 6/20/2025).   - Dragon voice recognition software was utilized to complete this chart.  Every reasonable attempt was made to edit and correct the text, however some incorrect words may remain.   Mendoza Livingston DO    Patient or patient representative verbalized consent for the use of Ambient Listening during the visit with  Mendoza Livingston DO for chart documentation. 6/4/2025  23:01 EDT

## 2025-05-21 LAB
ALBUMIN SERPL-MCNC: 4.2 G/DL (ref 3.5–5.2)
ALBUMIN/GLOB SERPL: 1.4 G/DL
ALP SERPL-CCNC: 146 U/L (ref 39–117)
ALT SERPL-CCNC: 13 U/L (ref 1–33)
AST SERPL-CCNC: 25 U/L (ref 1–32)
BASOPHILS # BLD AUTO: 0.04 10*3/MM3 (ref 0–0.2)
BASOPHILS NFR BLD AUTO: 0.5 % (ref 0–1.5)
BILIRUB SERPL-MCNC: <0.2 MG/DL (ref 0–1.2)
BUN SERPL-MCNC: 16 MG/DL (ref 8–23)
BUN/CREAT SERPL: 13.7 (ref 7–25)
CALCIUM SERPL-MCNC: 9.3 MG/DL (ref 8.6–10.5)
CHLORIDE SERPL-SCNC: 102 MMOL/L (ref 98–107)
CHOLEST SERPL-MCNC: 227 MG/DL (ref 0–200)
CO2 SERPL-SCNC: 28 MMOL/L (ref 22–29)
CREAT SERPL-MCNC: 1.17 MG/DL (ref 0.57–1)
EGFRCR SERPLBLD CKD-EPI 2021: 52.5 ML/MIN/1.73
EOSINOPHIL # BLD AUTO: 0.17 10*3/MM3 (ref 0–0.4)
EOSINOPHIL NFR BLD AUTO: 2.2 % (ref 0.3–6.2)
ERYTHROCYTE [DISTWIDTH] IN BLOOD BY AUTOMATED COUNT: 13.5 % (ref 12.3–15.4)
FOLATE SERPL-MCNC: 4.29 NG/ML (ref 4.78–24.2)
GLOBULIN SER CALC-MCNC: 3 GM/DL
GLUCOSE SERPL-MCNC: 99 MG/DL (ref 65–99)
HCT VFR BLD AUTO: 43.6 % (ref 34–46.6)
HDLC SERPL-MCNC: 49 MG/DL (ref 40–60)
HGB BLD-MCNC: 14.4 G/DL (ref 12–15.9)
IMM GRANULOCYTES # BLD AUTO: 0.06 10*3/MM3 (ref 0–0.05)
IMM GRANULOCYTES NFR BLD AUTO: 0.8 % (ref 0–0.5)
LDLC SERPL CALC-MCNC: 153 MG/DL (ref 0–100)
LYMPHOCYTES # BLD AUTO: 3.62 10*3/MM3 (ref 0.7–3.1)
LYMPHOCYTES NFR BLD AUTO: 46.2 % (ref 19.6–45.3)
MCH RBC QN AUTO: 33.1 PG (ref 26.6–33)
MCHC RBC AUTO-ENTMCNC: 33 G/DL (ref 31.5–35.7)
MCV RBC AUTO: 100.2 FL (ref 79–97)
MONOCYTES # BLD AUTO: 0.45 10*3/MM3 (ref 0.1–0.9)
MONOCYTES NFR BLD AUTO: 5.7 % (ref 5–12)
NEUTROPHILS # BLD AUTO: 3.49 10*3/MM3 (ref 1.7–7)
NEUTROPHILS NFR BLD AUTO: 44.6 % (ref 42.7–76)
NRBC BLD AUTO-RTO: 0 /100 WBC (ref 0–0.2)
PLATELET # BLD AUTO: 229 10*3/MM3 (ref 140–450)
POTASSIUM SERPL-SCNC: 3.8 MMOL/L (ref 3.5–5.2)
PROT SERPL-MCNC: 7.2 G/DL (ref 6–8.5)
RBC # BLD AUTO: 4.35 10*6/MM3 (ref 3.77–5.28)
SODIUM SERPL-SCNC: 142 MMOL/L (ref 136–145)
T3FREE SERPL-MCNC: 1.7 PG/ML (ref 2–4.4)
T4 FREE SERPL-MCNC: 0.36 NG/DL (ref 0.92–1.68)
TRIGL SERPL-MCNC: 138 MG/DL (ref 0–150)
TSH SERPL DL<=0.005 MIU/L-ACNC: 61.7 UIU/ML (ref 0.27–4.2)
VIT B12 SERPL-MCNC: 353 PG/ML (ref 211–946)
VLDLC SERPL CALC-MCNC: 25 MG/DL (ref 5–40)
WBC # BLD AUTO: 7.83 10*3/MM3 (ref 3.4–10.8)

## 2025-06-04 ENCOUNTER — RESULTS FOLLOW-UP (OUTPATIENT)
Dept: FAMILY MEDICINE CLINIC | Facility: CLINIC | Age: 65
End: 2025-06-04
Payer: COMMERCIAL

## 2025-06-05 RX ORDER — LEVOMEFOLATE CALCIUM 15 MG
15 TABLET ORAL DAILY
Qty: 30 TABLET | Refills: 2 | Status: SHIPPED | OUTPATIENT
Start: 2025-06-05

## 2025-06-05 RX ORDER — LEVOTHYROXINE SODIUM 300 UG/1
300 TABLET ORAL DAILY
Qty: 30 TABLET | Refills: 2 | Status: SHIPPED | OUTPATIENT
Start: 2025-06-05

## 2025-06-17 ENCOUNTER — OFFICE VISIT (OUTPATIENT)
Dept: FAMILY MEDICINE CLINIC | Facility: CLINIC | Age: 65
End: 2025-06-17
Payer: COMMERCIAL

## 2025-06-17 VITALS
BODY MASS INDEX: 47.09 KG/M2 | OXYGEN SATURATION: 93 % | SYSTOLIC BLOOD PRESSURE: 172 MMHG | DIASTOLIC BLOOD PRESSURE: 112 MMHG | HEART RATE: 71 BPM | HEIGHT: 66 IN | WEIGHT: 293 LBS

## 2025-06-17 DIAGNOSIS — G43.909 MIGRAINE WITHOUT STATUS MIGRAINOSUS, NOT INTRACTABLE, UNSPECIFIED MIGRAINE TYPE: Primary | ICD-10-CM

## 2025-06-17 DIAGNOSIS — E66.813 CLASS 3 SEVERE OBESITY DUE TO EXCESS CALORIES WITHOUT SERIOUS COMORBIDITY WITH BODY MASS INDEX (BMI) OF 50.0 TO 59.9 IN ADULT: ICD-10-CM

## 2025-06-17 DIAGNOSIS — E66.01 OBESITY, MORBID, BMI 40.0-49.9: ICD-10-CM

## 2025-06-17 DIAGNOSIS — I10 ESSENTIAL HYPERTENSION: ICD-10-CM

## 2025-06-17 DIAGNOSIS — E03.9 ACQUIRED HYPOTHYROIDISM: ICD-10-CM

## 2025-06-17 PROCEDURE — 99214 OFFICE O/P EST MOD 30 MIN: CPT | Performed by: STUDENT IN AN ORGANIZED HEALTH CARE EDUCATION/TRAINING PROGRAM

## 2025-06-17 RX ORDER — TOPIRAMATE 100 MG/1
100 TABLET, FILM COATED ORAL DAILY
Qty: 30 TABLET | Refills: 2 | Status: SHIPPED | OUTPATIENT
Start: 2025-06-17

## 2025-06-17 RX ORDER — PHENTERMINE HYDROCHLORIDE 37.5 MG/1
37.5 TABLET ORAL
Qty: 30 TABLET | Refills: 0 | Status: SHIPPED | OUTPATIENT
Start: 2025-06-17

## 2025-06-17 RX ORDER — HYDROCHLOROTHIAZIDE 12.5 MG/1
12.5 TABLET ORAL DAILY
Qty: 30 TABLET | Refills: 2 | Status: SHIPPED | OUTPATIENT
Start: 2025-06-17

## 2025-06-17 NOTE — PROGRESS NOTES
Mendoza Livingston DO  [facility]  [address]  [place] [id number] [phone]    Subjective      Name Iris Hyde MRN [id number]    1960 AGE/SEX 65 y.o. / female      Chief Complaint Chief Complaint   Patient presents with    Obesity     Here for one month follow up. Weight has been up and down, has not been able to start medication due to cost.          Visit History for  2025    Iris Hyde is a 65 y.o. female who presented today for Obesity (Here for one month follow up. Weight has been up and down, has not been able to start medication due to cost. )       History of Present Illness  The patient presents for evaluation of hypothyroidism, hypertension, and weight management.    She reports persistent leg pain and elevated blood pressure. She is currently taking losartan for blood pressure management. Additionally, she experiences frequent urination, often every 15 to 20 minutes, and sometimes spends up to 40 minutes in the bathroom attempting to empty her bladder. She has episodes of lightheadedness upon standing, which she believes may be related to low blood pressure. During these episodes, she refrains from taking her blood pressure medication. She has a history of bladder spasms and was prescribed Gemtesa, which initially provided relief by reducing her urinary frequency to 4 or 5 times daily. However, she discontinued the medication due to its side effects. She was scheduled for a cystoscopy with Dr. Morales in 2025 but missed the appointment due to relocation. She has since rescheduled the procedure. She also reports sleeping in a recliner, which causes discomfort in her shoulder.    She has not been adhering to her Qsymia regimen for the past month and has yet to initiate her folic acid therapy. She reports a lack of energy and motivation, which she believes may be due to her current medication regimen. She has previously taken Topamax for  migraine management and notes that it significantly reduced her appetite, leading to a weight loss of 45 pounds. She is currently on Synthroid, which she reports as being ineffective in managing her symptoms.    She also mentions experiencing persistent leg pain, which she attributes to her elevated blood pressure. She is currently on losartan for blood pressure management. She reports frequent urination, often every 15 to 20 minutes, and occasionally spends up to 40 minutes in the bathroom attempting to empty her bladder. She has experienced episodes of lightheadedness upon standing, which she believes may be related to low blood pressure. During these episodes, she refrains from taking her blood pressure medication. She has a history of bladder spasms, for which she was prescribed Gemtesa. The medication initially provided relief, reducing her urinary frequency to 4 or 5 times daily. However, she discontinued the medication due to its side effects. She was scheduled for a cystoscopy with Dr. Morales in 01/2025 but missed the appointment due to relocation. She has since rescheduled the procedure. She also reports sleeping in a recliner, which causes discomfort in her shoulder.       Medications and Allergies   Current Outpatient Medications   Medication Instructions    albuterol sulfate  (90 Base) MCG/ACT inhaler 2 puffs, Inhalation, Every 4 Hours PRN    celecoxib (CELEBREX) 100 mg, Oral, 2 Times Daily    cyclobenzaprine (FLEXERIL) 10 mg, Oral, 3 Times Daily PRN, for muscle spams    fluticasone (Flonase) 50 MCG/ACT nasal spray 1 spray in each nostril twice daily    hydroCHLOROthiazide 12.5 mg, Oral, Daily    ipratropium-albuterol (DUO-NEB) 0.5-2.5 mg/3 ml nebulizer  INHALE 1 VIAL BY NEBULIZER EVERY 6 HOURS AS NEEDED FOR COUGH    l-methylfolate 15 mg, Oral, Daily    levothyroxine (SYNTHROID, LEVOTHROID) 300 mcg, Oral, Daily    losartan (COZAAR) 25 mg, Oral, 2 Times Daily    phentermine (ADIPEX-P) 37.5 mg,  "Oral, Every Morning Before Breakfast    promethazine (PHENERGAN) 25 mg, Oral, Every 6 Hours PRN    topiramate (TOPAMAX) 100 mg, Oral, Daily    varenicline (CHANTIX) 1 mg, Oral, 2 Times Daily    venlafaxine XR (EFFEXOR-XR) 150 mg, Oral, Daily    Vibegron (Gemtesa) 75 MG tablet     Vitamin D-Vitamin K (VITAMIN K2-VITAMIN D3 PO) Take  by mouth.    zolpidem (AMBIEN) 5 mg, Oral, Nightly PRN     Allergies   Allergen Reactions    Lisinopril Cough    Butorphanol Tartrate Other (See Comments)     PSYCHOTIC    Hydrocodone-Acetaminophen Itching     NORCO AND VICODIN    Cymbalta [Duloxetine Hcl] Other (See Comments)     SUICIDAL IDEATION      I have reviewed the above medications and allergies     Objective:      Vitals Vitals:    06/17/25 1147   BP: (!) 172/112   BP Location: Left arm   Patient Position: Sitting   Cuff Size: Large Adult   Pulse: 71   SpO2: 93%   Weight: (!) 146 kg (321 lb)   Height: 167.6 cm (66\")     Body mass index is 51.81 kg/m².    Physical Exam  Vitals reviewed.   Constitutional:       General: She is not in acute distress.     Appearance: She is not ill-appearing.   Pulmonary:      Effort: Pulmonary effort is normal.   Psychiatric:         Mood and Affect: Mood normal.         Behavior: Behavior normal.         Thought Content: Thought content normal.         Judgment: Judgment normal.          Physical Exam       Results       Assessment/Plan   Issues Addressed/ Plan   Diagnosis Plan   1. Migraine without status migrainosus, not intractable, unspecified migraine type  topiramate (Topamax) 100 MG tablet      2. Obesity, morbid, BMI 40.0-49.9  phentermine (ADIPEX-P) 37.5 MG tablet      3. Essential hypertension  hydroCHLOROthiazide 12.5 MG tablet      4. Acquired hypothyroidism        5. Class 3 severe obesity due to excess calories without serious comorbidity with body mass index (BMI) of 50.0 to 59.9 in adult           Assessment & Plan  1. Hypothyroidism.  - Her T3 and T4 levels are suboptimal, " potentially contributing to her fatigue.  - Increased fatigue and low energy levels.  - The possibility of adding T3 supplementation was discussed if T3 levels do not improve with current treatment.  - She is advised to continue her current regimen of levothyroxine. A re-evaluation of her cortisol levels will be conducted in one month.    2. Hypertension.  - Blood pressure is elevated, likely due to stress and pain.  - Increased pain and swelling in legs.  - Discussion about adding hydrochlorothiazide to help manage fluid retention and swelling in her legs.  - Hydrochlorothiazide will be added to her current regimen of losartan.    3. Weight management.  - She has not been taking Qsymia due to cost concerns.  - Weight gain and decreased appetite control.  - Prescription for phentermine and Topamax 100 mg will be provided as an alternative to Qsymia.  - The Topamax will be covered by insurance.    Follow-up  - The patient will follow up in 3 months.           There are no Patient Instructions on file for this visit.   Follow up  recommended Return in about 3 months (around 9/17/2025).   - Dragon voice recognition software was utilized to complete this chart.  Every reasonable attempt was made to edit and correct the text, however some incorrect words may remain.   Mendoza Livingston DO    Patient or patient representative verbalized consent for the use of Ambient Listening during the visit with  Mendoza Livingston DO for chart documentation. 6/30/2025  23:12 EDT

## 2025-06-26 ENCOUNTER — TELEPHONE (OUTPATIENT)
Dept: ORTHOPEDIC SURGERY | Facility: CLINIC | Age: 65
End: 2025-06-26
Payer: COMMERCIAL

## 2025-06-26 NOTE — TELEPHONE ENCOUNTER
LMOM advising patient she needs to be seen and to contact our office to make an appt because its been over 1 year since she has been seen in the office.

## 2025-07-02 DIAGNOSIS — F51.01 PRIMARY INSOMNIA: ICD-10-CM

## 2025-07-02 RX ORDER — TRAZODONE HYDROCHLORIDE 50 MG/1
50 TABLET ORAL NIGHTLY
Qty: 30 TABLET | Refills: 0 | OUTPATIENT
Start: 2025-07-02

## 2025-07-04 DIAGNOSIS — R11.0 NAUSEA: ICD-10-CM

## 2025-07-07 RX ORDER — PROMETHAZINE HYDROCHLORIDE 25 MG/1
TABLET ORAL
Qty: 90 TABLET | Refills: 0 | Status: SHIPPED | OUTPATIENT
Start: 2025-07-07

## 2025-07-25 ENCOUNTER — OFFICE VISIT (OUTPATIENT)
Dept: ORTHOPEDIC SURGERY | Facility: CLINIC | Age: 65
End: 2025-07-25
Payer: COMMERCIAL

## 2025-07-25 VITALS — TEMPERATURE: 98 F | HEIGHT: 66 IN | BODY MASS INDEX: 47.09 KG/M2 | WEIGHT: 293 LBS

## 2025-07-25 DIAGNOSIS — M16.11 PRIMARY OSTEOARTHRITIS OF RIGHT HIP: ICD-10-CM

## 2025-07-25 DIAGNOSIS — R52 PAIN: Primary | ICD-10-CM

## 2025-07-25 NOTE — PROGRESS NOTES
Patient: Iris Hyde  YOB: 1960 65 y.o. female  Medical Record Number: [id number]    Chief Complaints:   Chief Complaint   Patient presents with    Right Hip - Follow-up       History of Present Illness:Iris Hyde is a 65 y.o. female who presents with complaints of worsening right hip pain.  She has known history of osteoarthritis, reports over the last several weeks her pain has worsened.  Denies any recent injury    Allergies:   Allergies   Allergen Reactions    Lisinopril Cough    Butorphanol Tartrate Other (See Comments)     PSYCHOTIC    Hydrocodone-Acetaminophen Itching     NORCO AND VICODIN    Cymbalta [Duloxetine Hcl] Other (See Comments)     SUICIDAL IDEATION       Medications:   Current Outpatient Medications   Medication Sig Dispense Refill    albuterol sulfate  (90 Base) MCG/ACT inhaler Inhale 2 puffs Every 4 (Four) Hours As Needed for Wheezing or Shortness of Air. 18 g 1    celecoxib (CeleBREX) 100 MG capsule Take 1 capsule by mouth 2 (Two) Times a Day. 90 capsule 7    cyclobenzaprine (FLEXERIL) 10 MG tablet Take 1 tablet by mouth 3 (Three) Times a Day As Needed for Muscle Spasms. for muscle spams 90 tablet 3    fluticasone (Flonase) 50 MCG/ACT nasal spray 1 spray in each nostril twice daily (Patient taking differently: Administer 1 spray into the nostril(s) as directed by provider Daily As Needed.) 16 g 1    hydroCHLOROthiazide 12.5 MG tablet Take 1 tablet by mouth Daily. 30 tablet 2    ipratropium-albuterol (DUO-NEB) 0.5-2.5 mg/3 ml nebulizer  INHALE 1 VIAL BY NEBULIZER EVERY 6 HOURS AS NEEDED FOR COUGH      l-methylfolate 15 MG tablet tablet Take 1 tablet by mouth Daily. 30 tablet 2    levothyroxine (SYNTHROID, LEVOTHROID) 300 MCG tablet Take 1 tablet by mouth Daily. 30 tablet 2    losartan (COZAAR) 25 MG tablet Take 1 tablet by mouth 2 (Two) Times a Day. 180 tablet 0    phentermine (ADIPEX-P) 37.5 MG tablet Take 1 tablet by mouth Every Morning Before Breakfast. 30 tablet 0  "   promethazine (PHENERGAN) 25 MG tablet TAKE 1 TABLET BY MOUTH EVERY 6 HOURS AS NEEDED FOR NAUSEA AND VOMITING (GASTROPARESIS) 90 tablet 0    topiramate (Topamax) 100 MG tablet Take 1 tablet by mouth Daily. 30 tablet 2    varenicline (CHANTIX) 1 MG tablet Take 1 tablet by mouth 2 (Two) Times a Day. 56 tablet 0    venlafaxine XR (EFFEXOR-XR) 150 MG 24 hr capsule Take 1 capsule by mouth Daily. 30 capsule 2    Vibegron (Gemtesa) 75 MG tablet       Vitamin D-Vitamin K (VITAMIN K2-VITAMIN D3 PO) Take  by mouth.      zolpidem (Ambien) 5 MG tablet Take 1 tablet by mouth At Night As Needed for Sleep. 30 tablet 2     No current facility-administered medications for this visit.         The following portions of the patient's history were reviewed and updated as appropriate: allergies, current medications, past family history, past medical history, past social history, past surgical history and problem list.    Review of Systems:   14 point review of systems was performed. All systems negative except pertinent positives/negatives listed in HPI above    Physical Exam:   Vitals:    07/25/25 1518   Temp: 98 °F (36.7 °C)   Weight: (!) 143 kg (315 lb 4.8 oz)   Height: 167.6 cm (66\")       General: A and O x 3, ASA, NAD    Body mass index is 50.89 kg/m².  Skin clear no unusual lesions noted  Right hip patient is nontender to palpation with limited range of motion secondary to pain with [facility] positive [facility]      Radiology:  Xrays 2 views of right hip were ordered and reviewed today secondary to increased pain show bone-on-bone end-stage osteoarthritis with cyst and spur formation.  Compared to views are unchanged    Assessment/Plan: End-stage osteoarthritis right hip with increased pain    Patient and I discussed options including conservative measures versus total hip replacement, we will proceed with right hip fluoroscopy guided cortisone injection hopefully that will provide her some relief she understands her " BMI needs to be 40 or less in order to proceed with surgery we will see her back once she is at goal we will further discuss surgical intervention      Pura Escobar, APRN  7/25/2025

## 2025-08-08 DIAGNOSIS — E66.01 OBESITY, MORBID, BMI 40.0-49.9: ICD-10-CM

## 2025-08-11 RX ORDER — PHENTERMINE HYDROCHLORIDE 37.5 MG/1
37.5 TABLET ORAL
Qty: 30 TABLET | Refills: 0 | Status: SHIPPED | OUTPATIENT
Start: 2025-08-11

## 2025-08-13 ENCOUNTER — HOSPITAL ENCOUNTER (OUTPATIENT)
Dept: GENERAL RADIOLOGY | Facility: HOSPITAL | Age: 65
Discharge: HOME | End: 2025-08-13
Admitting: NURSE PRACTITIONER
Payer: COMMERCIAL

## 2025-08-13 DIAGNOSIS — F17.210 CIGARETTE NICOTINE DEPENDENCE WITHOUT COMPLICATION: ICD-10-CM

## 2025-08-13 DIAGNOSIS — M16.11 PRIMARY OSTEOARTHRITIS OF RIGHT HIP: ICD-10-CM

## 2025-08-13 PROCEDURE — 25010000002 METHYLPREDNISOLONE PER 80 MG: Performed by: NURSE PRACTITIONER

## 2025-08-13 PROCEDURE — 25510000001 IOPAMIDOL 61 % SOLUTION: Performed by: NURSE PRACTITIONER

## 2025-08-13 PROCEDURE — 77002 NEEDLE LOCALIZATION BY XRAY: CPT

## 2025-08-13 PROCEDURE — 25010000002 BUPIVACAINE (PF) 0.25 % SOLUTION: Performed by: NURSE PRACTITIONER

## 2025-08-13 PROCEDURE — 25010000002 LIDOCAINE 1 % SOLUTION: Performed by: NURSE PRACTITIONER

## 2025-08-13 RX ORDER — METHYLPREDNISOLONE ACETATE 80 MG/ML
80 INJECTION, SUSPENSION INTRA-ARTICULAR; INTRALESIONAL; INTRAMUSCULAR; SOFT TISSUE ONCE
Status: COMPLETED | OUTPATIENT
Start: 2025-08-13 | End: 2025-08-13

## 2025-08-13 RX ORDER — VARENICLINE TARTRATE 1 MG/1
1 TABLET, FILM COATED ORAL 2 TIMES DAILY
Qty: 56 TABLET | Refills: 2 | Status: SHIPPED | OUTPATIENT
Start: 2025-08-13

## 2025-08-13 RX ORDER — BUPIVACAINE HYDROCHLORIDE 2.5 MG/ML
10 INJECTION, SOLUTION EPIDURAL; INFILTRATION; INTRACAUDAL; PERINEURAL
Status: COMPLETED | OUTPATIENT
Start: 2025-08-13 | End: 2025-08-13

## 2025-08-13 RX ORDER — LIDOCAINE HYDROCHLORIDE 10 MG/ML
10 INJECTION, SOLUTION INFILTRATION; PERINEURAL
Status: COMPLETED | OUTPATIENT
Start: 2025-08-13 | End: 2025-08-13

## 2025-08-13 RX ORDER — IOPAMIDOL 612 MG/ML
50 INJECTION, SOLUTION INTRAVASCULAR
Status: COMPLETED | OUTPATIENT
Start: 2025-08-13 | End: 2025-08-13

## 2025-08-13 RX ADMIN — LIDOCAINE HYDROCHLORIDE 3 ML: 10 INJECTION, SOLUTION INFILTRATION; PERINEURAL at 09:20

## 2025-08-13 RX ADMIN — BUPIVACAINE HYDROCHLORIDE 5 ML: 2.5 INJECTION, SOLUTION EPIDURAL; INFILTRATION; INTRACAUDAL; PERINEURAL at 09:20

## 2025-08-13 RX ADMIN — METHYLPREDNISOLONE ACETATE 80 MG: 80 INJECTION, SUSPENSION INTRA-ARTICULAR; INTRALESIONAL; INTRAMUSCULAR; SOFT TISSUE at 09:20

## 2025-08-13 RX ADMIN — IOPAMIDOL 2 ML: 612 INJECTION, SOLUTION INTRAVENOUS at 09:20

## 2025-08-14 DIAGNOSIS — F51.01 PRIMARY INSOMNIA: ICD-10-CM

## 2025-08-18 DIAGNOSIS — F51.01 PRIMARY INSOMNIA: ICD-10-CM

## 2025-08-18 RX ORDER — ZOLPIDEM TARTRATE 5 MG/1
5 TABLET ORAL NIGHTLY PRN
Qty: 30 TABLET | Refills: 2 | Status: SHIPPED | OUTPATIENT
Start: 2025-08-18

## 2025-08-19 RX ORDER — ZOLPIDEM TARTRATE 5 MG/1
5 TABLET ORAL NIGHTLY PRN
Qty: 30 TABLET | Refills: 2 | OUTPATIENT
Start: 2025-08-19

## (undated) DEVICE — BITEBLOCK OMNI BLOC

## (undated) DEVICE — THE TORRENT IRRIGATION SCOPE CONNECTOR IS USED WITH THE TORRENT IRRIGATION TUBING TO PROVIDE IRRIGATION FLUIDS SUCH AS STERILE WATER DURING GASTROINTESTINAL ENDOSCOPIC PROCEDURES WHEN USED IN CONJUNCTION WITH AN IRRIGATION PUMP (OR ELECTROSURGICAL UNIT).: Brand: TORRENT

## (undated) DEVICE — THE SINGLE USE ETRAP – POLYP TRAP IS USED FOR SUCTION RETRIEVAL OF ENDOSCOPICALLY REMOVED POLYPS.: Brand: ETRAP

## (undated) DEVICE — GOWN,NON-REINFORCED,SIRUS,SET IN SLV,XL: Brand: MEDLINE

## (undated) DEVICE — CANN NASL CO2 TRULINK W/O2 A/

## (undated) DEVICE — TIDISHIELD UROLOGY DRAIN BAGS FROSTY VINYL STERILE FITS SIEMENS UROSKOP ACCESS 20 PER CASE: Brand: TIDISHIELD

## (undated) DEVICE — GLV SURG SENSICARE MICRO PF LF 7.5 STRL

## (undated) DEVICE — TOTAL TRAY, 16FR 10ML SIL FOLEY, URN: Brand: MEDLINE

## (undated) DEVICE — FRCP BX RADJAW4 NDL 2.8 240CM LG OG BX40

## (undated) DEVICE — NITINOL WIRE WITH HYDROPHILIC TIP: Brand: SENSOR

## (undated) DEVICE — CATH URETRL FLXITP POLLACK STD 5F 70CM

## (undated) DEVICE — TUBING, SUCTION, 1/4" X 10', STRAIGHT: Brand: MEDLINE

## (undated) DEVICE — LOU CYSTO: Brand: MEDLINE INDUSTRIES, INC.

## (undated) DEVICE — SYR LUERLOK 20CC

## (undated) DEVICE — Device: Brand: DEFENDO AIR/WATER/SUCTION AND BIOPSY VALVE

## (undated) DEVICE — SNAR POLYP SENSATION STDOVL 27 240 BX40